# Patient Record
Sex: FEMALE | Race: WHITE
[De-identification: names, ages, dates, MRNs, and addresses within clinical notes are randomized per-mention and may not be internally consistent; named-entity substitution may affect disease eponyms.]

---

## 2018-11-12 ENCOUNTER — HOSPITAL ENCOUNTER (EMERGENCY)
Dept: HOSPITAL 8 - ED | Age: 58
Discharge: HOME | End: 2018-11-12
Payer: MEDICAID

## 2018-11-12 VITALS — BODY MASS INDEX: 24.59 KG/M2 | WEIGHT: 153 LBS | HEIGHT: 66 IN

## 2018-11-12 VITALS — DIASTOLIC BLOOD PRESSURE: 78 MMHG | SYSTOLIC BLOOD PRESSURE: 125 MMHG

## 2018-11-12 DIAGNOSIS — B35.3: ICD-10-CM

## 2018-11-12 DIAGNOSIS — L03.031: Primary | ICD-10-CM

## 2018-11-12 PROCEDURE — 99283 EMERGENCY DEPT VISIT LOW MDM: CPT

## 2018-11-12 PROCEDURE — 82962 GLUCOSE BLOOD TEST: CPT

## 2019-06-01 ENCOUNTER — HOSPITAL ENCOUNTER (EMERGENCY)
Dept: HOSPITAL 8 - ED | Age: 59
Discharge: HOME | End: 2019-06-01
Payer: SELF-PAY

## 2019-06-01 VITALS — DIASTOLIC BLOOD PRESSURE: 77 MMHG | SYSTOLIC BLOOD PRESSURE: 125 MMHG

## 2019-06-01 VITALS — BODY MASS INDEX: 24.55 KG/M2 | WEIGHT: 152.78 LBS | HEIGHT: 66 IN

## 2019-06-01 DIAGNOSIS — F10.220: Primary | ICD-10-CM

## 2019-06-01 DIAGNOSIS — E86.0: ICD-10-CM

## 2019-06-01 DIAGNOSIS — R11.2: ICD-10-CM

## 2019-06-01 DIAGNOSIS — F17.200: ICD-10-CM

## 2019-06-01 LAB
ALBUMIN SERPL-MCNC: 3.8 G/DL (ref 3.4–5)
ALP SERPL-CCNC: 67 U/L (ref 45–117)
ALT SERPL-CCNC: 53 U/L (ref 12–78)
ANION GAP SERPL CALC-SCNC: 11 MMOL/L (ref 5–15)
BASOPHILS # BLD AUTO: 0.06 X10^3/UL (ref 0–0.1)
BASOPHILS NFR BLD AUTO: 1 % (ref 0–1)
BILIRUB SERPL-MCNC: 0.3 MG/DL (ref 0.2–1)
CALCIUM SERPL-MCNC: 9.5 MG/DL (ref 8.5–10.1)
CHLORIDE SERPL-SCNC: 107 MMOL/L (ref 98–107)
CREAT SERPL-MCNC: 0.61 MG/DL (ref 0.55–1.02)
EOSINOPHIL # BLD AUTO: 0.07 X10^3/UL (ref 0–0.4)
EOSINOPHIL NFR BLD AUTO: 1 % (ref 1–7)
ERYTHROCYTE [DISTWIDTH] IN BLOOD BY AUTOMATED COUNT: 14.1 % (ref 9.6–15.2)
LYMPHOCYTES # BLD AUTO: 3.13 X10^3/UL (ref 1–3.4)
LYMPHOCYTES NFR BLD AUTO: 45 % (ref 22–44)
MCH RBC QN AUTO: 34.9 PG (ref 27–34.8)
MCHC RBC AUTO-ENTMCNC: 34 G/DL (ref 32.4–35.8)
MCV RBC AUTO: 102.7 FL (ref 80–100)
MD: NO
MONOCYTES # BLD AUTO: 0.74 X10^3/UL (ref 0.2–0.8)
MONOCYTES NFR BLD AUTO: 11 % (ref 2–9)
NEUTROPHILS # BLD AUTO: 2.92 X10^3/UL (ref 1.8–6.8)
NEUTROPHILS NFR BLD AUTO: 42 % (ref 42–75)
PLATELET # BLD AUTO: 271 X10^3/UL (ref 130–400)
PMV BLD AUTO: 7.4 FL (ref 7.4–10.4)
PROT SERPL-MCNC: 7.5 G/DL (ref 6.4–8.2)
RBC # BLD AUTO: 4.82 X10^6/UL (ref 3.82–5.3)

## 2019-06-01 PROCEDURE — 80053 COMPREHEN METABOLIC PANEL: CPT

## 2019-06-01 PROCEDURE — 85025 COMPLETE CBC W/AUTO DIFF WBC: CPT

## 2019-06-01 PROCEDURE — 96375 TX/PRO/DX INJ NEW DRUG ADDON: CPT

## 2019-06-01 PROCEDURE — 96361 HYDRATE IV INFUSION ADD-ON: CPT

## 2019-06-01 PROCEDURE — 80307 DRUG TEST PRSMV CHEM ANLYZR: CPT

## 2019-06-01 PROCEDURE — 96374 THER/PROPH/DIAG INJ IV PUSH: CPT

## 2019-06-01 PROCEDURE — 99283 EMERGENCY DEPT VISIT LOW MDM: CPT

## 2019-06-01 PROCEDURE — 36415 COLL VENOUS BLD VENIPUNCTURE: CPT

## 2019-06-01 PROCEDURE — 83690 ASSAY OF LIPASE: CPT

## 2019-06-01 NOTE — NUR
PT STATES THAT SHE HAS BEEN HAVING N/V/D X 5 DAYS. PT STATES THAT SHE HASN'T 
REALLY BEEN ABLE TO KEEP ANY FOOD OR FLUIDS DOWN. monitors applied, siderails 
up x2, call light within reach

## 2019-06-01 NOTE — NUR
IV SITE STARTED BY PARAMEDIC STUDENT, IV FLUIDS INFUSING AND PT MEDICATED PER 
MAR. AWAITING LAB RESULTS

## 2022-05-03 ENCOUNTER — APPOINTMENT (OUTPATIENT)
Dept: RADIOLOGY | Facility: MEDICAL CENTER | Age: 62
DRG: 897 | End: 2022-05-03
Attending: EMERGENCY MEDICINE
Payer: MEDICAID

## 2022-05-03 ENCOUNTER — HOSPITAL ENCOUNTER (INPATIENT)
Facility: MEDICAL CENTER | Age: 62
LOS: 1 days | DRG: 897 | End: 2022-05-04
Attending: EMERGENCY MEDICINE | Admitting: INTERNAL MEDICINE
Payer: MEDICAID

## 2022-05-03 PROBLEM — F10.929 ALCOHOLIC INTOXICATION WITH COMPLICATION (HCC): Status: ACTIVE | Noted: 2022-05-03

## 2022-05-03 PROBLEM — R94.31 PROLONGATION OF QRS COMPLEX ON ELECTROCARDIOGRAPHY: Status: ACTIVE | Noted: 2022-05-03

## 2022-05-03 PROBLEM — R74.01 TRANSAMINITIS: Status: ACTIVE | Noted: 2022-05-03

## 2022-05-03 PROBLEM — R11.2 NAUSEA & VOMITING: Status: ACTIVE | Noted: 2022-05-03

## 2022-05-03 PROBLEM — N39.0 URINARY TRACT INFECTION WITHOUT HEMATURIA: Status: ACTIVE | Noted: 2022-05-03

## 2022-05-03 PROBLEM — E86.0 DEHYDRATION: Status: ACTIVE | Noted: 2022-05-03

## 2022-05-03 PROBLEM — R19.7 DIARRHEA: Status: RESOLVED | Noted: 2022-05-03 | Resolved: 2022-05-03

## 2022-05-03 PROBLEM — E87.6 HYPOKALEMIA: Status: ACTIVE | Noted: 2022-05-03

## 2022-05-03 PROBLEM — R19.7 DIARRHEA: Status: ACTIVE | Noted: 2022-05-03

## 2022-05-03 PROBLEM — R79.89 TROPONIN I ABOVE REFERENCE RANGE: Status: ACTIVE | Noted: 2022-05-03

## 2022-05-03 LAB
ALBUMIN SERPL BCP-MCNC: 3.3 G/DL (ref 3.2–4.9)
ALBUMIN/GLOB SERPL: 0.9 G/DL
ALP SERPL-CCNC: 279 U/L (ref 30–99)
ALT SERPL-CCNC: 43 U/L (ref 2–50)
AMORPH CRY #/AREA URNS HPF: PRESENT /HPF
ANION GAP SERPL CALC-SCNC: 18 MMOL/L (ref 7–16)
ANISOCYTOSIS BLD QL SMEAR: ABNORMAL
APPEARANCE UR: ABNORMAL
AST SERPL-CCNC: 201 U/L (ref 12–45)
BACTERIA #/AREA URNS HPF: ABNORMAL /HPF
BASOPHILS # BLD AUTO: 0.9 % (ref 0–1.8)
BASOPHILS # BLD: 0.06 K/UL (ref 0–0.12)
BILIRUB SERPL-MCNC: 2.6 MG/DL (ref 0.1–1.5)
BILIRUB UR QL STRIP.AUTO: NEGATIVE
BUN SERPL-MCNC: 6 MG/DL (ref 8–22)
CALCIUM SERPL-MCNC: 8.9 MG/DL (ref 8.5–10.5)
CHLORIDE SERPL-SCNC: 95 MMOL/L (ref 96–112)
CO2 SERPL-SCNC: 25 MMOL/L (ref 20–33)
COLOR UR: YELLOW
CREAT SERPL-MCNC: 0.31 MG/DL (ref 0.5–1.4)
EOSINOPHIL # BLD AUTO: 0 K/UL (ref 0–0.51)
EOSINOPHIL NFR BLD: 0 % (ref 0–6.9)
EPI CELLS #/AREA URNS HPF: NEGATIVE /HPF
ERYTHROCYTE [DISTWIDTH] IN BLOOD BY AUTOMATED COUNT: 75.1 FL (ref 35.9–50)
ETHANOL BLD-MCNC: 263.6 MG/DL
GFR SERPLBLD CREATININE-BSD FMLA CKD-EPI: 120 ML/MIN/1.73 M 2
GLOBULIN SER CALC-MCNC: 3.8 G/DL (ref 1.9–3.5)
GLUCOSE SERPL-MCNC: 88 MG/DL (ref 65–99)
GLUCOSE UR STRIP.AUTO-MCNC: NEGATIVE MG/DL
HCT VFR BLD AUTO: 38.3 % (ref 37–47)
HGB BLD-MCNC: 13.2 G/DL (ref 12–16)
HYALINE CASTS #/AREA URNS LPF: ABNORMAL /LPF
KETONES UR STRIP.AUTO-MCNC: NEGATIVE MG/DL
LACTATE BLD-SCNC: 5.4 MMOL/L (ref 0.5–2)
LEUKOCYTE ESTERASE UR QL STRIP.AUTO: ABNORMAL
LIPASE SERPL-CCNC: 23 U/L (ref 11–82)
LYMPHOCYTES # BLD AUTO: 1.68 K/UL (ref 1–4.8)
LYMPHOCYTES NFR BLD: 25.4 % (ref 22–41)
MACROCYTES BLD QL SMEAR: ABNORMAL
MAGNESIUM SERPL-MCNC: 1.7 MG/DL (ref 1.5–2.5)
MANUAL DIFF BLD: NORMAL
MCH RBC QN AUTO: 35.6 PG (ref 27–33)
MCHC RBC AUTO-ENTMCNC: 34.5 G/DL (ref 33.6–35)
MCV RBC AUTO: 103.2 FL (ref 81.4–97.8)
METAMYELOCYTES NFR BLD MANUAL: 0.8 %
MICRO URNS: ABNORMAL
MONOCYTES # BLD AUTO: 0.67 K/UL (ref 0–0.85)
MONOCYTES NFR BLD AUTO: 10.2 % (ref 0–13.4)
MORPHOLOGY BLD-IMP: NORMAL
NEUTROPHILS # BLD AUTO: 4.14 K/UL (ref 2–7.15)
NEUTROPHILS NFR BLD: 62.7 % (ref 44–72)
NITRITE UR QL STRIP.AUTO: POSITIVE
NRBC # BLD AUTO: 0 K/UL
NRBC BLD-RTO: 0 /100 WBC
OVALOCYTES BLD QL SMEAR: NORMAL
PH UR STRIP.AUTO: 7 [PH] (ref 5–8)
PHOSPHATE SERPL-MCNC: 2.5 MG/DL (ref 2.5–4.5)
PLATELET # BLD AUTO: 294 K/UL (ref 164–446)
PLATELET BLD QL SMEAR: NORMAL
PMV BLD AUTO: 9.5 FL (ref 9–12.9)
POIKILOCYTOSIS BLD QL SMEAR: NORMAL
POLYCHROMASIA BLD QL SMEAR: NORMAL
POTASSIUM SERPL-SCNC: 2.5 MMOL/L (ref 3.6–5.5)
POTASSIUM SERPL-SCNC: 2.7 MMOL/L (ref 3.6–5.5)
PROCALCITONIN SERPL-MCNC: 0.46 NG/ML
PROT SERPL-MCNC: 7.1 G/DL (ref 6–8.2)
PROT UR QL STRIP: NEGATIVE MG/DL
RBC # BLD AUTO: 3.71 M/UL (ref 4.2–5.4)
RBC # URNS HPF: ABNORMAL /HPF
RBC BLD AUTO: PRESENT
RBC UR QL AUTO: NEGATIVE
SODIUM SERPL-SCNC: 138 MMOL/L (ref 135–145)
SP GR UR STRIP.AUTO: 1.01
TROPONIN T SERPL-MCNC: 113 NG/L (ref 6–19)
TROPONIN T SERPL-MCNC: 119 NG/L (ref 6–19)
UROBILINOGEN UR STRIP.AUTO-MCNC: 1 MG/DL
WBC # BLD AUTO: 6.6 K/UL (ref 4.8–10.8)
WBC #/AREA URNS HPF: ABNORMAL /HPF

## 2022-05-03 PROCEDURE — 71045 X-RAY EXAM CHEST 1 VIEW: CPT

## 2022-05-03 PROCEDURE — 83605 ASSAY OF LACTIC ACID: CPT

## 2022-05-03 PROCEDURE — 87040 BLOOD CULTURE FOR BACTERIA: CPT | Mod: 91

## 2022-05-03 PROCEDURE — 93005 ELECTROCARDIOGRAM TRACING: CPT | Performed by: EMERGENCY MEDICINE

## 2022-05-03 PROCEDURE — 70450 CT HEAD/BRAIN W/O DYE: CPT

## 2022-05-03 PROCEDURE — 96375 TX/PRO/DX INJ NEW DRUG ADDON: CPT

## 2022-05-03 PROCEDURE — 700111 HCHG RX REV CODE 636 W/ 250 OVERRIDE (IP): Performed by: STUDENT IN AN ORGANIZED HEALTH CARE EDUCATION/TRAINING PROGRAM

## 2022-05-03 PROCEDURE — 83690 ASSAY OF LIPASE: CPT

## 2022-05-03 PROCEDURE — 85007 BL SMEAR W/DIFF WBC COUNT: CPT

## 2022-05-03 PROCEDURE — 700102 HCHG RX REV CODE 250 W/ 637 OVERRIDE(OP): Performed by: STUDENT IN AN ORGANIZED HEALTH CARE EDUCATION/TRAINING PROGRAM

## 2022-05-03 PROCEDURE — 700105 HCHG RX REV CODE 258

## 2022-05-03 PROCEDURE — 700101 HCHG RX REV CODE 250

## 2022-05-03 PROCEDURE — 36415 COLL VENOUS BLD VENIPUNCTURE: CPT

## 2022-05-03 PROCEDURE — HZ2ZZZZ DETOXIFICATION SERVICES FOR SUBSTANCE ABUSE TREATMENT: ICD-10-PCS | Performed by: INTERNAL MEDICINE

## 2022-05-03 PROCEDURE — 85025 COMPLETE CBC W/AUTO DIFF WBC: CPT

## 2022-05-03 PROCEDURE — 96365 THER/PROPH/DIAG IV INF INIT: CPT

## 2022-05-03 PROCEDURE — 87086 URINE CULTURE/COLONY COUNT: CPT

## 2022-05-03 PROCEDURE — G0378 HOSPITAL OBSERVATION PER HR: HCPCS

## 2022-05-03 PROCEDURE — 99285 EMERGENCY DEPT VISIT HI MDM: CPT

## 2022-05-03 PROCEDURE — 80053 COMPREHEN METABOLIC PANEL: CPT

## 2022-05-03 PROCEDURE — 84132 ASSAY OF SERUM POTASSIUM: CPT | Mod: XU

## 2022-05-03 PROCEDURE — 84145 PROCALCITONIN (PCT): CPT

## 2022-05-03 PROCEDURE — 700111 HCHG RX REV CODE 636 W/ 250 OVERRIDE (IP): Performed by: EMERGENCY MEDICINE

## 2022-05-03 PROCEDURE — 74018 RADEX ABDOMEN 1 VIEW: CPT

## 2022-05-03 PROCEDURE — 81001 URINALYSIS AUTO W/SCOPE: CPT

## 2022-05-03 PROCEDURE — 82077 ASSAY SPEC XCP UR&BREATH IA: CPT

## 2022-05-03 PROCEDURE — 83735 ASSAY OF MAGNESIUM: CPT

## 2022-05-03 PROCEDURE — 84100 ASSAY OF PHOSPHORUS: CPT

## 2022-05-03 PROCEDURE — 96366 THER/PROPH/DIAG IV INF ADDON: CPT

## 2022-05-03 PROCEDURE — 84484 ASSAY OF TROPONIN QUANT: CPT

## 2022-05-03 PROCEDURE — A9270 NON-COVERED ITEM OR SERVICE: HCPCS | Performed by: EMERGENCY MEDICINE

## 2022-05-03 PROCEDURE — 700105 HCHG RX REV CODE 258: Performed by: EMERGENCY MEDICINE

## 2022-05-03 PROCEDURE — 99220 PR INITIAL OBSERVATION CARE,LEVL III: CPT | Performed by: STUDENT IN AN ORGANIZED HEALTH CARE EDUCATION/TRAINING PROGRAM

## 2022-05-03 PROCEDURE — 96367 TX/PROPH/DG ADDL SEQ IV INF: CPT

## 2022-05-03 PROCEDURE — 700105 HCHG RX REV CODE 258: Performed by: STUDENT IN AN ORGANIZED HEALTH CARE EDUCATION/TRAINING PROGRAM

## 2022-05-03 PROCEDURE — 700102 HCHG RX REV CODE 250 W/ 637 OVERRIDE(OP): Performed by: EMERGENCY MEDICINE

## 2022-05-03 PROCEDURE — 96368 THER/DIAG CONCURRENT INF: CPT

## 2022-05-03 PROCEDURE — A9270 NON-COVERED ITEM OR SERVICE: HCPCS | Performed by: STUDENT IN AN ORGANIZED HEALTH CARE EDUCATION/TRAINING PROGRAM

## 2022-05-03 PROCEDURE — 700101 HCHG RX REV CODE 250: Performed by: EMERGENCY MEDICINE

## 2022-05-03 RX ORDER — POTASSIUM CHLORIDE 7.45 MG/ML
10 INJECTION INTRAVENOUS ONCE
Status: COMPLETED | OUTPATIENT
Start: 2022-05-03 | End: 2022-05-03

## 2022-05-03 RX ORDER — LORAZEPAM 1 MG/1
2 TABLET ORAL
Status: DISCONTINUED | OUTPATIENT
Start: 2022-05-03 | End: 2022-05-04 | Stop reason: HOSPADM

## 2022-05-03 RX ORDER — LORAZEPAM 2 MG/ML
1 INJECTION INTRAMUSCULAR
Status: DISCONTINUED | OUTPATIENT
Start: 2022-05-03 | End: 2022-05-04 | Stop reason: HOSPADM

## 2022-05-03 RX ORDER — LORAZEPAM 2 MG/ML
0.5 INJECTION INTRAMUSCULAR EVERY 4 HOURS PRN
Status: DISCONTINUED | OUTPATIENT
Start: 2022-05-03 | End: 2022-05-04 | Stop reason: HOSPADM

## 2022-05-03 RX ORDER — POLYETHYLENE GLYCOL 3350 17 G/17G
1 POWDER, FOR SOLUTION ORAL
Status: DISCONTINUED | OUTPATIENT
Start: 2022-05-03 | End: 2022-05-04

## 2022-05-03 RX ORDER — ENEMA 19; 7 G/133ML; G/133ML
1 ENEMA RECTAL ONCE
Status: DISCONTINUED | OUTPATIENT
Start: 2022-05-03 | End: 2022-05-04

## 2022-05-03 RX ORDER — BISACODYL 10 MG
10 SUPPOSITORY, RECTAL RECTAL
Status: DISCONTINUED | OUTPATIENT
Start: 2022-05-03 | End: 2022-05-03

## 2022-05-03 RX ORDER — LORAZEPAM 2 MG/ML
2 INJECTION INTRAMUSCULAR
Status: DISCONTINUED | OUTPATIENT
Start: 2022-05-03 | End: 2022-05-04 | Stop reason: HOSPADM

## 2022-05-03 RX ORDER — LORAZEPAM 1 MG/1
3 TABLET ORAL
Status: DISCONTINUED | OUTPATIENT
Start: 2022-05-03 | End: 2022-05-04 | Stop reason: HOSPADM

## 2022-05-03 RX ORDER — SODIUM CHLORIDE 9 MG/ML
1000 INJECTION, SOLUTION INTRAVENOUS ONCE
Status: COMPLETED | OUTPATIENT
Start: 2022-05-03 | End: 2022-05-03

## 2022-05-03 RX ORDER — FOLIC ACID 1 MG/1
1 TABLET ORAL DAILY
Status: DISCONTINUED | OUTPATIENT
Start: 2022-05-04 | End: 2022-05-04 | Stop reason: HOSPADM

## 2022-05-03 RX ORDER — LORAZEPAM 1 MG/1
4 TABLET ORAL
Status: DISCONTINUED | OUTPATIENT
Start: 2022-05-03 | End: 2022-05-04 | Stop reason: HOSPADM

## 2022-05-03 RX ORDER — LORAZEPAM 1 MG/1
0.5 TABLET ORAL EVERY 4 HOURS PRN
Status: DISCONTINUED | OUTPATIENT
Start: 2022-05-03 | End: 2022-05-04 | Stop reason: HOSPADM

## 2022-05-03 RX ORDER — POTASSIUM CHLORIDE 20 MEQ/1
40 TABLET, EXTENDED RELEASE ORAL ONCE
Status: COMPLETED | OUTPATIENT
Start: 2022-05-03 | End: 2022-05-03

## 2022-05-03 RX ORDER — CEFTRIAXONE 2 G/1
2 INJECTION, POWDER, FOR SOLUTION INTRAMUSCULAR; INTRAVENOUS ONCE
Status: COMPLETED | OUTPATIENT
Start: 2022-05-03 | End: 2022-05-03

## 2022-05-03 RX ORDER — BISACODYL 10 MG
10 SUPPOSITORY, RECTAL RECTAL
Status: DISCONTINUED | OUTPATIENT
Start: 2022-05-03 | End: 2022-05-04

## 2022-05-03 RX ORDER — AMOXICILLIN 250 MG
2 CAPSULE ORAL 2 TIMES DAILY
Status: DISCONTINUED | OUTPATIENT
Start: 2022-05-03 | End: 2022-05-03

## 2022-05-03 RX ORDER — GAUZE BANDAGE 2" X 2"
100 BANDAGE TOPICAL DAILY
Status: DISCONTINUED | OUTPATIENT
Start: 2022-05-04 | End: 2022-05-04 | Stop reason: HOSPADM

## 2022-05-03 RX ORDER — SODIUM CHLORIDE AND POTASSIUM CHLORIDE 300; 900 MG/100ML; MG/100ML
INJECTION, SOLUTION INTRAVENOUS CONTINUOUS
Status: DISCONTINUED | OUTPATIENT
Start: 2022-05-03 | End: 2022-05-04

## 2022-05-03 RX ORDER — SODIUM CHLORIDE 9 MG/ML
INJECTION, SOLUTION INTRAVENOUS CONTINUOUS
Status: DISCONTINUED | OUTPATIENT
Start: 2022-05-03 | End: 2022-05-04 | Stop reason: HOSPADM

## 2022-05-03 RX ORDER — POTASSIUM CHLORIDE 20 MEQ/1
40 TABLET, EXTENDED RELEASE ORAL EVERY 4 HOURS
Status: DISPENSED | OUTPATIENT
Start: 2022-05-03 | End: 2022-05-04

## 2022-05-03 RX ORDER — NEOMYCIN SULFATE 500 MG/1
500 TABLET ORAL 2 TIMES DAILY
Status: ON HOLD | COMMUNITY
End: 2022-05-08

## 2022-05-03 RX ORDER — POLYETHYLENE GLYCOL 3350 17 G/17G
1 POWDER, FOR SOLUTION ORAL
Status: DISCONTINUED | OUTPATIENT
Start: 2022-05-03 | End: 2022-05-03

## 2022-05-03 RX ORDER — OXYBUTYNIN CHLORIDE 5 MG/1
5 TABLET ORAL 2 TIMES DAILY
Status: ON HOLD | COMMUNITY
End: 2022-06-13

## 2022-05-03 RX ORDER — LORAZEPAM 2 MG/ML
1.5 INJECTION INTRAMUSCULAR
Status: DISCONTINUED | OUTPATIENT
Start: 2022-05-03 | End: 2022-05-04 | Stop reason: HOSPADM

## 2022-05-03 RX ORDER — AMOXICILLIN 250 MG
2 CAPSULE ORAL 2 TIMES DAILY
Status: DISCONTINUED | OUTPATIENT
Start: 2022-05-03 | End: 2022-05-04

## 2022-05-03 RX ORDER — LORAZEPAM 1 MG/1
1 TABLET ORAL EVERY 4 HOURS PRN
Status: DISCONTINUED | OUTPATIENT
Start: 2022-05-03 | End: 2022-05-04 | Stop reason: HOSPADM

## 2022-05-03 RX ADMIN — LORAZEPAM 1.5 MG: 2 INJECTION INTRAMUSCULAR; INTRAVENOUS at 21:59

## 2022-05-03 RX ADMIN — THIAMINE HYDROCHLORIDE: 100 INJECTION, SOLUTION INTRAMUSCULAR; INTRAVENOUS at 15:31

## 2022-05-03 RX ADMIN — SENNOSIDES AND DOCUSATE SODIUM 2 TABLET: 50; 8.6 TABLET ORAL at 18:00

## 2022-05-03 RX ADMIN — SODIUM CHLORIDE 1000 ML: 9 INJECTION, SOLUTION INTRAVENOUS at 15:32

## 2022-05-03 RX ADMIN — RIVAROXABAN 10 MG: 10 TABLET, FILM COATED ORAL at 19:03

## 2022-05-03 RX ADMIN — POTASSIUM CHLORIDE 40 MEQ: 1500 TABLET, EXTENDED RELEASE ORAL at 15:30

## 2022-05-03 RX ADMIN — POTASSIUM CHLORIDE 40 MEQ: 20 TABLET, EXTENDED RELEASE ORAL at 22:02

## 2022-05-03 RX ADMIN — SODIUM CHLORIDE: 9 INJECTION, SOLUTION INTRAVENOUS at 19:03

## 2022-05-03 RX ADMIN — POTASSIUM CHLORIDE 10 MEQ: 10 INJECTION, SOLUTION INTRAVENOUS at 15:30

## 2022-05-03 RX ADMIN — POTASSIUM CHLORIDE AND SODIUM CHLORIDE: 900; 300 INJECTION, SOLUTION INTRAVENOUS at 22:02

## 2022-05-03 RX ADMIN — CEFTRIAXONE SODIUM 2 G: 2 INJECTION, POWDER, FOR SOLUTION INTRAMUSCULAR; INTRAVENOUS at 15:29

## 2022-05-03 RX ADMIN — SODIUM CHLORIDE 1000 ML: 9 INJECTION, SOLUTION INTRAVENOUS at 19:04

## 2022-05-03 RX ADMIN — POTASSIUM CHLORIDE 40 MEQ: 20 TABLET, EXTENDED RELEASE ORAL at 19:03

## 2022-05-03 ASSESSMENT — ENCOUNTER SYMPTOMS
NAUSEA: 1
BLURRED VISION: 0
TREMORS: 1
DIZZINESS: 0
ABDOMINAL PAIN: 1
BRUISES/BLEEDS EASILY: 0
SHORTNESS OF BREATH: 0
COUGH: 0
FEVER: 0
MYALGIAS: 0

## 2022-05-03 ASSESSMENT — LIFESTYLE VARIABLES
TOTAL SCORE: 21
PAROXYSMAL SWEATS: NO SWEAT VISIBLE
TACTILE DISTURBANCES: VERY MILD ITCHING, PINS AND NEEDLES SENSATION, BURNING OR NUMBNESS
AGITATION: *
NAUSEA AND VOMITING: *
AUDITORY DISTURBANCES: NOT PRESENT
TREMOR: *
ORIENTATION AND CLOUDING OF SENSORIUM: ORIENTED AND CAN DO SERIAL ADDITIONS
ANXIETY: *
TOTAL SCORE: 4
ORIENTATION AND CLOUDING OF SENSORIUM: ORIENTED AND CAN DO SERIAL ADDITIONS
TREMOR: *
AUDITORY DISTURBANCES: MILD HARSHNESS OR ABILITY TO FRIGHTEN
HEADACHE, FULLNESS IN HEAD: MODERATELY SEVERE
VISUAL DISTURBANCES: NOT PRESENT
AGITATION: NORMAL ACTIVITY
ANXIETY: NO ANXIETY (AT EASE)
NAUSEA AND VOMITING: MILD NAUSEA WITH NO VOMITING
PAROXYSMAL SWEATS: NO SWEAT VISIBLE
VISUAL DISTURBANCES: NOT PRESENT
HEADACHE, FULLNESS IN HEAD: VERY MILD

## 2022-05-03 NOTE — ED NOTES
Med Rec complete per pt  Allergies Reviewed    Pt reports that she has been taking NEOMYCIN since 3/2022.  RX for 1000 mg 4 times daily, pt reports that she has been taking 500 mg twice daily.

## 2022-05-03 NOTE — ASSESSMENT & PLAN NOTE
Alcohol induced  Continue monitor  Get right upper quadrant ultrasound  Consult for alcohol abstinence

## 2022-05-03 NOTE — ED PROVIDER NOTES
ED Provider Note    CHIEF COMPLAINT  Chief Complaint   Patient presents with   • Abdominal Pain     RLQ X 1 month, dx c-diff at [facility] one month ago, tx with abx currently, denies diarrhea, denies blood in stool or urine   • N/V     X 3 months, denies blood in vomit   • Weight Loss     X 6 months, weight loss of approx 70 pounds, pt has been admitted to [facility] twice and states she was not given a diagnosis, denies hx of cancer   • Weakness     X 3 months       HPI  Vianney Castro is a 62 y.o. female who presents with a chief complaint of weight loss, abdominal pain, nausea and vomiting, generalized weakness, and inability to care for self.  This is been ongoing for the past several months.  She reports she was recently discharged from [facility] after hospitalization for similar symptoms.  She reports that she was diagnosed with C. difficile and was started on neomycin.  She has been taking this medication but has persistent diarrhea.  She has not had any measured fevers but does report being cold.  She denies any melena or hematochezia.  She has had decreased appetite because whenever she eats she becomes nauseated.  She reports a significant weight loss over the past several months.  She has had multiple falls and does not think she can take care of herself at home.  She does admit to drinking alcohol routinely, typically 1/5 but has been halving that.    REVIEW OF SYSTEMS  See HPI for further details.  Abdominal pain.  Nausea.  Vomiting.  Weight loss.  Weakness.  Failure to thrive.  All other systems are negative.     PAST MEDICAL HISTORY   has a past medical history of Encephalitis and Hyperlipidemia.    SOCIAL HISTORY  Social History     Tobacco Use   • Smoking status: Current Every Day Smoker   • Smokeless tobacco: Not on file   Substance and Sexual Activity   • Alcohol use: Yes   • Drug use: No   • Sexual activity: Not on file       SURGICAL HISTORY   has a past surgical history that includes treat  "ectopic preg,non remval; tonsillectomy; and other orthopedic surgery.    CURRENT MEDICATIONS  Home Medications     Reviewed by Salvador Webster R.N. (Registered Nurse) on 05/03/22 at 1215  Med List Status: <None>   Medication Last Dose Status        Patient James Taking any Medications                       ALLERGIES  No Known Allergies    PHYSICAL EXAM  VITAL SIGNS: BP (!) 96/63   Pulse 95   Temp 36.3 °C (97.3 °F) (Temporal)   Resp 20   Ht 1.676 m (5' 6\")   Wt 45.4 kg (100 lb)   SpO2 94%   BMI 16.14 kg/m²    Pulse ox interpretation: I interpret this pulse ox as normal.  Constitutional: Alert in no apparent distress.  HENT: No signs of trauma, Bilateral external ears normal, Nose normal.  Dry mucous membranes.  Eyes: Pupils are equal and reactive, Conjunctiva normal, Non-icteric.   Neck: Normal range of motion, No tenderness, Supple, No stridor.   Lymphatic: No lymphadenopathy noted.   Cardiovascular: Regular rate and rhythm, no murmurs. Pulses symmetrical.  Thorax & Lungs: Normal breath sounds, No respiratory distress, No wheezing.   Abdomen: Bowel sounds normal, Soft, diffuse but mild tenderness, No masses, No pulsatile masses. No peritoneal signs.  Skin: Warm, Dry, No erythema, No rash.   Back: Normal alignment.  Extremities: No cyanosis.  Musculoskeletal: No major deformities noted.   Neurologic: Alert, No focal deficits noted.   Psychiatric: Affect normal, Judgment normal, Mood normal.     DIAGNOSTIC STUDIES / PROCEDURES    LABS  Results for orders placed or performed during the hospital encounter of 05/03/22   CBC WITH DIFFERENTIAL   Result Value Ref Range    WBC 6.6 4.8 - 10.8 K/uL    RBC 3.71 (L) 4.20 - 5.40 M/uL    Hemoglobin 13.2 12.0 - 16.0 g/dL    Hematocrit 38.3 37.0 - 47.0 %    .2 (H) 81.4 - 97.8 fL    MCH 35.6 (H) 27.0 - 33.0 pg    MCHC 34.5 33.6 - 35.0 g/dL    RDW 75.1 (H) 35.9 - 50.0 fL    Platelet Count 294 164 - 446 K/uL    MPV 9.5 9.0 - 12.9 fL    Neutrophils-Polys 62.70 44.00 - " 72.00 %    Lymphocytes 25.40 22.00 - 41.00 %    Monocytes 10.20 0.00 - 13.40 %    Eosinophils 0.00 0.00 - 6.90 %    Basophils 0.90 0.00 - 1.80 %    Nucleated RBC 0.00 /100 WBC    Neutrophils (Absolute) 4.14 2.00 - 7.15 K/uL    Lymphs (Absolute) 1.68 1.00 - 4.80 K/uL    Monos (Absolute) 0.67 0.00 - 0.85 K/uL    Eos (Absolute) 0.00 0.00 - 0.51 K/uL    Baso (Absolute) 0.06 0.00 - 0.12 K/uL    NRBC (Absolute) 0.00 K/uL    Anisocytosis 2+ (A)     Macrocytosis 2+ (A)    COMP METABOLIC PANEL   Result Value Ref Range    Sodium 138 135 - 145 mmol/L    Potassium 2.5 (LL) 3.6 - 5.5 mmol/L    Chloride 95 (L) 96 - 112 mmol/L    Co2 25 20 - 33 mmol/L    Anion Gap 18.0 (H) 7.0 - 16.0    Glucose 88 65 - 99 mg/dL    Bun 6 (L) 8 - 22 mg/dL    Creatinine 0.31 (L) 0.50 - 1.40 mg/dL    Calcium 8.9 8.5 - 10.5 mg/dL    AST(SGOT) 201 (H) 12 - 45 U/L    ALT(SGPT) 43 2 - 50 U/L    Alkaline Phosphatase 279 (H) 30 - 99 U/L    Total Bilirubin 2.6 (H) 0.1 - 1.5 mg/dL    Albumin 3.3 3.2 - 4.9 g/dL    Total Protein 7.1 6.0 - 8.2 g/dL    Globulin 3.8 (H) 1.9 - 3.5 g/dL    A-G Ratio 0.9 g/dL   LIPASE   Result Value Ref Range    Lipase 23 11 - 82 U/L   URINALYSIS    Specimen: Urine   Result Value Ref Range    Color Yellow     Character Cloudy (A)     Specific Gravity 1.007 <1.035    Ph 7.0 5.0 - 8.0    Glucose Negative Negative mg/dL    Ketones Negative Negative mg/dL    Protein Negative Negative mg/dL    Bilirubin Negative Negative    Urobilinogen, Urine 1.0 Negative    Nitrite Positive (A) Negative    Leukocyte Esterase Moderate (A) Negative    Occult Blood Negative Negative    Micro Urine Req Microscopic    URINE MICROSCOPIC (W/UA)   Result Value Ref Range    WBC 0-2 /hpf    RBC 0-2 /hpf    Bacteria Moderate (A) None /hpf    Epithelial Cells Negative /hpf    Amorphous Crystal Present /hpf    Hyaline Cast 0-2 /lpf   DIFFERENTIAL MANUAL   Result Value Ref Range    Metamyelocytes 0.80 %    Manual Diff Status PERFORMED    PERIPHERAL SMEAR REVIEW    Result Value Ref Range    Peripheral Smear Review see below    PLATELET ESTIMATE   Result Value Ref Range    Plt Estimation Normal    MORPHOLOGY   Result Value Ref Range    RBC Morphology Present     Polychromia 1+     Poikilocytosis 1+     Ovalocytes 1+    ESTIMATED GFR   Result Value Ref Range    GFR (CKD-EPI) 120 >60 mL/min/1.73 m 2     RADIOLOGY  [id number] VIEW   Final Result      1.  Large amount of stool in the rectum.   2.  No bowel obstruction.      DX-CHEST-PORTABLE (1 VIEW)   Final Result      No acute cardiopulmonary abnormality.         CT-HEAD W/O    (Results Pending)     COURSE & MEDICAL DECISION MAKING  Pertinent Labs & Imaging studies reviewed. (See chart for details)  Records obtained and reviewed: Patient was admitted to [facility] in March for generalized weakness and inability to care for herself.  She does have a history of alcohol dependence and pancreatitis.  During her most recent hospitalization she had nausea and vomiting with hematemesis and diarrhea.  Patient was initially found to be hypotensive with hypokalemia to 1.8 and a sodium of 126.  She was diagnosed with C. difficile.  She was started on neomycin.  It was felt that she should require rehab.    This is a 62 year old female who is here with generalized weakness, failure to thrive, and inability to care for herself. She arrives with low blood pressure but is not overtly hypotensive. She is afebrile with otherwise normal vital signs beyond the blood pressure. She appears dehydrated with dry mucous membranes. She is able to move all four extremities with equal strength and sensation. She has some lower abdominal discomfort and reports constipation. Her abdomen is mildly tender but she has no peritoneal signs. An xray was performed that did reveal large amount of stool in the rectum but no bowel obstruction. An enema was ordered.    Patient was started on IV fluids.    CBC is without leukocytosis or anemia. MCV is quite  elevated c/w patient's reports of daily alcohol use. Metabolic panel demonstrates multiple abnormalities including hypokalemia to 2.5 which was repleted both orally and via IV. She does have mildly low Chloride at 95 with anion gap of 18. AST is elevated to 201 and total bilirubin is elevated to 2.6. Troponin elevated to 119. Patient denies any chest pain and EKG does not suggest acute ischemia. UA suggests infection and patient given a dose of ceftriaxone. Alcohol level is quite elevated to 263.6. Magnesium ordered.    CXR is without acute abnormality. CT head is without acute abnormality.    Patient reevaluated at bedside. Blood pressure remains low despite IV fluids. She will require hospitalization for additional workup and management.    Patient is critically ill.   The patient continues to have: Electrolyte abnormalities, NSTEMI, hypotension  The vital organ system that is affected is the: All are at risk  If untreated there is a high chance of deterioration into: cardiac arrest, respiratory arrest, and eventually death.   The critical care that I am providing today is: Review of medical records, initial bedside evaluation and resuscitation, interpretation of lab/imaging results, medication management, discussion with hospitalist, multiple reevaluations, and preparation of the medical record.  The critical that has been undertaken is medically complex.   There has been no overlap in critical care time.   Critical Care Time not including procedures: 35 minutes    HYDRATION  HYDRATION: Based on the patient's presentation of Dehydration and Hypotension the patient was given IV fluids. IV Hydration was used because oral hydration was not adequate alone. Upon recheck following hydration, the patient was Improved.    FINAL IMPRESSION  1. Lactic acidosis  2. Hypokalemia  3. NSTEMI  4. Alcohol intoxication  5. Transaminitis  6. UTI      Electronically signed by: Timothy Valle M.D., 5/3/2022 1:35 PM

## 2022-05-03 NOTE — ED TRIAGE NOTES
"Chief Complaint   Patient presents with   • Abdominal Pain     RLQ X 1 month, dx c-diff at [facility] one month ago, tx with abx currently, denies diarrhea, denies blood in stool or urine   • N/V     X 3 months, denies blood in vomit   • Weight Loss     X 6 months, weight loss of approx 70 pounds, pt has been admitted to [facility] twice and states she was not given a diagnosis, denies hx of cancer   • Weakness     X 3 months     Pt to triage in WC with family, VSS on RA, GCS 15, pt in obvious discomfort.    Pt returned to lobby. Educated on triage process and to inform staff of any changes.     /98   Pulse 83   Temp 36.3 °C (97.3 °F) (Temporal)   Resp 16   Ht 1.676 m (5' 6\")   Wt 45.4 kg (100 lb)   SpO2 99%   BMI 16.14 kg/m²     "

## 2022-05-03 NOTE — ED NOTES
Pt wheeled to room for the triaged complaint. Pt transferred from wheelchair to bed but appears quite weak.     Urine collected and sent.    Pt incontinent of urine, states she has had incontinence for 20 years and takes oxybutynin for it but it has continued to worsen.

## 2022-05-03 NOTE — H&P
Hospital Medicine History & Physical Note    Date of Service  5/3/2022    Primary Care Physician  Pcp Pt States None    Code Status  No Order    Chief Complaint  Chief Complaint   Patient presents with   • Abdominal Pain     RLQ X 1 month, dx c-diff at [place] [facility] one month ago, tx with abx currently, denies diarrhea, denies blood in stool or urine   • N/V     X 3 months, denies blood in vomit   • Weight Loss     X 6 months, weight loss of approx 70 pounds, pt has been admitted to [facility] twice and states she was not given a diagnosis, denies hx of cancer   • Weakness     X 3 months       History of Presenting Illness  Vianney Castro is a 62 y.o. female with past medical history of alcohol abuse, C. difficile colitis who presented 5/3/2022 with generalized weakness associated with abdominal pain, nausea/vomiting since past few weeks.  She also reports of sustaining multiple fall.  She been drinking alcohol every day. Last drink was yesterday .     Reports of generalized  fatigue associated with.  Denies fever , cough ,  chest pain , palpation, Shortness of breathe  Denies abdominal pain , no melena . No rash /ulcer. Denies weakness/numbness,visual changes ,headache ,syncope.     On arrival to ED patient remained hypotensive, blood pressure 96 / 83, tachycardia heart rate 95, respiratory rate 20.  Labs remarkable for hypokalemia potassium 2.5, BUN/creatinine 6/0.3, troponin under 19, alcohol level 263.6,  UA positive for nitrate. Chest xray unremarkable. CT head unremarkable      Admitted for alcohol intoxication with intractable nausea and vomiting.    I discussed the plan of care with patient.    Review of Systems  Review of Systems   Constitutional: Negative for fever.   HENT: Negative for hearing loss.    Eyes: Negative for blurred vision.   Respiratory: Negative for cough and shortness of breath.    Cardiovascular: Negative for chest pain.   Gastrointestinal: Positive for abdominal pain and nausea.    Genitourinary: Negative for dysuria.   Musculoskeletal: Negative for myalgias.   Skin: Negative for rash.   Neurological: Positive for tremors. Negative for dizziness.   Endo/Heme/Allergies: Does not bruise/bleed easily.       Past Medical History   has a past medical history of Encephalitis and Hyperlipidemia.    Surgical History   has a past surgical history that includes pr treat ectopic preg,non remval; tonsillectomy; and other orthopedic surgery.     Family History  family history is not on file.   Family history reviewed with patient. There is no family history that is pertinent to the chief complaint.     Social History   reports that she has been smoking. She does not have any smokeless tobacco history on file. She reports current alcohol use. She reports that she does not use drugs.    Allergies  No Known Allergies    Medications  Prior to Admission Medications   Prescriptions Last Dose Informant Patient Reported? Taking?   neomycin (MYCIFRADIN) 500 MG Tab 5/2/2022 at PM Patient Yes Yes   Sig: Take 500 mg by mouth 2 times a day. Pt started taking NEOMYCIN 03/2022. P   oxybutynin (DITROPAN) 5 MG Tab 5/2/2022 at PM Patient Yes Yes   Sig: Take 5 mg by mouth 2 times a day.      Facility-Administered Medications: None       Physical Exam  Temp:  [36.3 °C (97.3 °F)] 36.3 °C (97.3 °F)  Pulse:  [83-97] 95  Resp:  [16-20] 20  BP: ()/(63-98) 96/63  SpO2:  [94 %-99 %] 94 %  Blood Pressure: (!) 96/63   Temperature: 36.3 °C (97.3 °F)   Pulse: 95   Respiration: 20   Pulse Oximetry: 94 %       Physical Exam  Constitutional:       General: She is not in acute distress.     Appearance: She is ill-appearing.   HENT:      Head: Normocephalic and atraumatic.      Right Ear: Tympanic membrane normal.      Left Ear: Tympanic membrane normal.      Nose: Nose normal.      Mouth/Throat:      Mouth: Mucous membranes are moist.   Eyes:      Extraocular Movements: Extraocular movements intact.      Pupils: Pupils are equal,  round, and reactive to light.   Cardiovascular:      Rate and Rhythm: Normal rate and regular rhythm.      Pulses: Normal pulses.   Pulmonary:      Effort: Pulmonary effort is normal. No respiratory distress.   Abdominal:      General: Abdomen is flat. There is no distension.   Musculoskeletal:      Cervical back: Normal range of motion.      Right lower leg: No edema.      Left lower leg: No edema.   Skin:     General: Skin is warm.   Neurological:      General: No focal deficit present.      Mental Status: She is alert and oriented to person, place, and time. Mental status is at baseline.      Comments: Noted generalized tremor   Psychiatric:         Mood and Affect: Mood normal.         Laboratory:  Recent Labs     05/03/22  1234   WBC 6.6   RBC 3.71*   HEMOGLOBIN 13.2   HEMATOCRIT 38.3   .2*   MCH 35.6*   MCHC 34.5   RDW 75.1*   PLATELETCT 294   MPV 9.5     Recent Labs     05/03/22  1234   SODIUM 138   POTASSIUM 2.5*   CHLORIDE 95*   CO2 25   GLUCOSE 88   BUN 6*   CREATININE 0.31*   CALCIUM 8.9     Recent Labs     05/03/22  1234   ALTSGPT 43   ASTSGOT 201*   ALKPHOSPHAT 279*   TBILIRUBIN 2.6*   LIPASE 23   GLUCOSE 88         No results for input(s): NTPROBNP in the last 72 hours.      Recent Labs     05/03/22  1234   TROPONINT 119*       Imaging:  CT-HEAD W/O   Final Result      1. No CT evidence of acute infarct, hemorrhage or mass.   2. Moderate global parenchymal atrophy. Chronic small vessel ischemic changes.      [id number] VIEW   Final Result      1.  Large amount of stool in the rectum.   2.  No bowel obstruction.      DX-CHEST-PORTABLE (1 VIEW)   Final Result      No acute cardiopulmonary abnormality.             X-Ray:  I have personally reviewed the images and compared with prior images.  EKG:  I have personally reviewed the images and compared with prior images.    Assessment/Plan:  Justification for Admission Status  I anticipate this patient is appropriate for observation status at this  time because For electrolyte disturbance, hypokalemia, alcohol intoxication, intractable nausea and vomiting    * Alcoholic intoxication with complication (HCC)  Assessment & Plan    -Cardiac monitoring  On CIWA   -Monitor electrolytes and renal function  -Check CPK, magnesium, phosphorus      Prolongation of QRS complex on electrocardiography  Assessment & Plan  Avoid QT prolongation medication    Troponin I above reference range  Assessment & Plan  Patient denying chest pain.  Troponin elevation likely from demand supply mismatch  -Admit to telemetry  -Follow serial troponin/CK-MB/EKG  -Transthoracic echocardiograph to identify regional wall motion abnormalities and assess LV function      Urinary tract infection without hematuria  Assessment & Plan  Continue on Rocephin  Follow urine culture    Transaminitis  Assessment & Plan  Alcohol induced  Continue monitor  Get right upper quadrant ultrasound  Consult for alcohol abstinence    Dehydration  Assessment & Plan  Continue IV fluid    Nausea & vomiting  Assessment & Plan  Intractable nausea and vomiting  Continue IV fluid  On antiemetic    Hypokalemia- (present on admission)  Assessment & Plan  Started on IV and oral KCl  Repeat labs later      VTE prophylaxis: SCDs/TEDs and Xarelto 10 mg daily as prophylaxis

## 2022-05-04 ENCOUNTER — HOSPITAL ENCOUNTER (INPATIENT)
Facility: MEDICAL CENTER | Age: 62
LOS: 4 days | DRG: 897 | End: 2022-05-08
Attending: STUDENT IN AN ORGANIZED HEALTH CARE EDUCATION/TRAINING PROGRAM | Admitting: STUDENT IN AN ORGANIZED HEALTH CARE EDUCATION/TRAINING PROGRAM
Payer: MEDICAID

## 2022-05-04 ENCOUNTER — APPOINTMENT (OUTPATIENT)
Dept: RADIOLOGY | Facility: MEDICAL CENTER | Age: 62
DRG: 897 | End: 2022-05-04
Attending: STUDENT IN AN ORGANIZED HEALTH CARE EDUCATION/TRAINING PROGRAM
Payer: MEDICAID

## 2022-05-04 VITALS
SYSTOLIC BLOOD PRESSURE: 105 MMHG | RESPIRATION RATE: 14 BRPM | OXYGEN SATURATION: 94 % | HEART RATE: 77 BPM | TEMPERATURE: 97.3 F | HEIGHT: 66 IN | WEIGHT: 100 LBS | BODY MASS INDEX: 16.07 KG/M2 | DIASTOLIC BLOOD PRESSURE: 66 MMHG

## 2022-05-04 DIAGNOSIS — R53.1 GENERALIZED WEAKNESS: ICD-10-CM

## 2022-05-04 DIAGNOSIS — F10.239 ALCOHOL DEPENDENCE WITH WITHDRAWAL WITH COMPLICATION (HCC): ICD-10-CM

## 2022-05-04 PROBLEM — F10.930 ALCOHOL WITHDRAWAL SYNDROME, UNCOMPLICATED (HCC): Status: ACTIVE | Noted: 2022-05-04

## 2022-05-04 PROBLEM — A41.9 SEPSIS (HCC): Status: ACTIVE | Noted: 2022-05-04

## 2022-05-04 PROBLEM — A41.9 SEPSIS (HCC): Status: RESOLVED | Noted: 2022-05-04 | Resolved: 2022-05-04

## 2022-05-04 PROBLEM — F10.939 ALCOHOL WITHDRAWAL (HCC): Status: ACTIVE | Noted: 2022-05-03

## 2022-05-04 LAB
ANION GAP SERPL CALC-SCNC: 14 MMOL/L (ref 7–16)
BUN SERPL-MCNC: 5 MG/DL (ref 8–22)
CALCIUM SERPL-MCNC: 8.1 MG/DL (ref 8.5–10.5)
CHLORIDE SERPL-SCNC: 98 MMOL/L (ref 96–112)
CO2 SERPL-SCNC: 22 MMOL/L (ref 20–33)
CREAT SERPL-MCNC: 0.24 MG/DL (ref 0.5–1.4)
EKG IMPRESSION: NORMAL
ERYTHROCYTE [DISTWIDTH] IN BLOOD BY AUTOMATED COUNT: 73.9 FL (ref 35.9–50)
GFR SERPLBLD CREATININE-BSD FMLA CKD-EPI: 127 ML/MIN/1.73 M 2
GLUCOSE SERPL-MCNC: 114 MG/DL (ref 65–99)
HCT VFR BLD AUTO: 31.2 % (ref 37–47)
HGB BLD-MCNC: 10.6 G/DL (ref 12–16)
LACTATE BLD-SCNC: 1.9 MMOL/L (ref 0.5–2)
LACTATE BLD-SCNC: 5 MMOL/L (ref 0.5–2)
MAGNESIUM SERPL-MCNC: 1.7 MG/DL (ref 1.5–2.5)
MCH RBC QN AUTO: 35.2 PG (ref 27–33)
MCHC RBC AUTO-ENTMCNC: 34 G/DL (ref 33.6–35)
MCV RBC AUTO: 103.7 FL (ref 81.4–97.8)
PLATELET # BLD AUTO: 185 K/UL (ref 164–446)
PMV BLD AUTO: 9.8 FL (ref 9–12.9)
POTASSIUM SERPL-SCNC: 3.6 MMOL/L (ref 3.6–5.5)
RBC # BLD AUTO: 3.01 M/UL (ref 4.2–5.4)
SODIUM SERPL-SCNC: 134 MMOL/L (ref 135–145)
TROPONIN T SERPL-MCNC: 110 NG/L (ref 6–19)
WBC # BLD AUTO: 5.7 K/UL (ref 4.8–10.8)

## 2022-05-04 PROCEDURE — A9270 NON-COVERED ITEM OR SERVICE: HCPCS | Performed by: HOSPITALIST

## 2022-05-04 PROCEDURE — 700102 HCHG RX REV CODE 250 W/ 637 OVERRIDE(OP): Performed by: INTERNAL MEDICINE

## 2022-05-04 PROCEDURE — 770020 HCHG ROOM/CARE - TELE (206)

## 2022-05-04 PROCEDURE — 83605 ASSAY OF LACTIC ACID: CPT

## 2022-05-04 PROCEDURE — 93010 ELECTROCARDIOGRAM REPORT: CPT | Performed by: INTERNAL MEDICINE

## 2022-05-04 PROCEDURE — 700102 HCHG RX REV CODE 250 W/ 637 OVERRIDE(OP): Performed by: HOSPITALIST

## 2022-05-04 PROCEDURE — 85027 COMPLETE CBC AUTOMATED: CPT

## 2022-05-04 PROCEDURE — 80048 BASIC METABOLIC PNL TOTAL CA: CPT

## 2022-05-04 PROCEDURE — 80307 DRUG TEST PRSMV CHEM ANLYZR: CPT

## 2022-05-04 PROCEDURE — G0378 HOSPITAL OBSERVATION PER HR: HCPCS

## 2022-05-04 PROCEDURE — 700105 HCHG RX REV CODE 258: Performed by: INTERNAL MEDICINE

## 2022-05-04 PROCEDURE — 36415 COLL VENOUS BLD VENIPUNCTURE: CPT

## 2022-05-04 PROCEDURE — 700102 HCHG RX REV CODE 250 W/ 637 OVERRIDE(OP): Performed by: STUDENT IN AN ORGANIZED HEALTH CARE EDUCATION/TRAINING PROGRAM

## 2022-05-04 PROCEDURE — 99223 1ST HOSP IP/OBS HIGH 75: CPT | Mod: AI | Performed by: HOSPITALIST

## 2022-05-04 PROCEDURE — A9270 NON-COVERED ITEM OR SERVICE: HCPCS | Performed by: INTERNAL MEDICINE

## 2022-05-04 PROCEDURE — 96367 TX/PROPH/DG ADDL SEQ IV INF: CPT

## 2022-05-04 PROCEDURE — 700111 HCHG RX REV CODE 636 W/ 250 OVERRIDE (IP): Performed by: INTERNAL MEDICINE

## 2022-05-04 PROCEDURE — 84484 ASSAY OF TROPONIN QUANT: CPT

## 2022-05-04 PROCEDURE — 700111 HCHG RX REV CODE 636 W/ 250 OVERRIDE (IP): Performed by: HOSPITALIST

## 2022-05-04 PROCEDURE — 83735 ASSAY OF MAGNESIUM: CPT

## 2022-05-04 PROCEDURE — A9270 NON-COVERED ITEM OR SERVICE: HCPCS | Performed by: STUDENT IN AN ORGANIZED HEALTH CARE EDUCATION/TRAINING PROGRAM

## 2022-05-04 PROCEDURE — 306637 HCHG MISC ORTHO ITEM RC 0274

## 2022-05-04 PROCEDURE — HZ2ZZZZ DETOXIFICATION SERVICES FOR SUBSTANCE ABUSE TREATMENT: ICD-10-PCS | Performed by: HOSPITALIST

## 2022-05-04 PROCEDURE — 76705 ECHO EXAM OF ABDOMEN: CPT

## 2022-05-04 PROCEDURE — 94760 N-INVAS EAR/PLS OXIMETRY 1: CPT

## 2022-05-04 PROCEDURE — 700105 HCHG RX REV CODE 258: Performed by: HOSPITALIST

## 2022-05-04 PROCEDURE — 96366 THER/PROPH/DIAG IV INF ADDON: CPT

## 2022-05-04 PROCEDURE — 99233 SBSQ HOSP IP/OBS HIGH 50: CPT | Performed by: INTERNAL MEDICINE

## 2022-05-04 RX ORDER — PROCHLORPERAZINE EDISYLATE 5 MG/ML
5-10 INJECTION INTRAMUSCULAR; INTRAVENOUS EVERY 4 HOURS PRN
Status: DISCONTINUED | OUTPATIENT
Start: 2022-05-04 | End: 2022-05-08 | Stop reason: HOSPADM

## 2022-05-04 RX ORDER — LORAZEPAM 1 MG/1
3 TABLET ORAL
Status: DISCONTINUED | OUTPATIENT
Start: 2022-05-04 | End: 2022-05-08

## 2022-05-04 RX ORDER — OXYBUTYNIN CHLORIDE 5 MG/1
5 TABLET ORAL 2 TIMES DAILY
Status: DISCONTINUED | OUTPATIENT
Start: 2022-05-04 | End: 2022-05-07

## 2022-05-04 RX ORDER — LORAZEPAM 1 MG/1
4 TABLET ORAL
Status: DISCONTINUED | OUTPATIENT
Start: 2022-05-04 | End: 2022-05-08

## 2022-05-04 RX ORDER — LORAZEPAM 1 MG/1
2 TABLET ORAL
Status: DISCONTINUED | OUTPATIENT
Start: 2022-05-04 | End: 2022-05-08

## 2022-05-04 RX ORDER — LORAZEPAM 2 MG/ML
1.5 INJECTION INTRAMUSCULAR
Status: DISCONTINUED | OUTPATIENT
Start: 2022-05-04 | End: 2022-05-08

## 2022-05-04 RX ORDER — AMOXICILLIN 250 MG
2 CAPSULE ORAL 2 TIMES DAILY
Status: DISCONTINUED | OUTPATIENT
Start: 2022-05-04 | End: 2022-05-08 | Stop reason: HOSPADM

## 2022-05-04 RX ORDER — ONDANSETRON 2 MG/ML
4 INJECTION INTRAMUSCULAR; INTRAVENOUS EVERY 4 HOURS PRN
Status: DISCONTINUED | OUTPATIENT
Start: 2022-05-04 | End: 2022-05-08 | Stop reason: HOSPADM

## 2022-05-04 RX ORDER — GAUZE BANDAGE 2" X 2"
100 BANDAGE TOPICAL DAILY
Status: DISCONTINUED | OUTPATIENT
Start: 2022-05-05 | End: 2022-05-08 | Stop reason: HOSPADM

## 2022-05-04 RX ORDER — PROMETHAZINE HYDROCHLORIDE 25 MG/1
12.5-25 TABLET ORAL EVERY 4 HOURS PRN
Status: DISCONTINUED | OUTPATIENT
Start: 2022-05-04 | End: 2022-05-08 | Stop reason: HOSPADM

## 2022-05-04 RX ORDER — POLYETHYLENE GLYCOL 3350 17 G/17G
1 POWDER, FOR SOLUTION ORAL
Status: DISCONTINUED | OUTPATIENT
Start: 2022-05-04 | End: 2022-05-08 | Stop reason: HOSPADM

## 2022-05-04 RX ORDER — SODIUM CHLORIDE 9 MG/ML
INJECTION, SOLUTION INTRAVENOUS CONTINUOUS
Status: ACTIVE | OUTPATIENT
Start: 2022-05-04 | End: 2022-05-04

## 2022-05-04 RX ORDER — ONDANSETRON 4 MG/1
4 TABLET, ORALLY DISINTEGRATING ORAL EVERY 4 HOURS PRN
Status: DISCONTINUED | OUTPATIENT
Start: 2022-05-04 | End: 2022-05-08 | Stop reason: HOSPADM

## 2022-05-04 RX ORDER — LORAZEPAM 2 MG/ML
2 INJECTION INTRAMUSCULAR
Status: DISCONTINUED | OUTPATIENT
Start: 2022-05-04 | End: 2022-05-08

## 2022-05-04 RX ORDER — LORAZEPAM 2 MG/ML
0.5 INJECTION INTRAMUSCULAR EVERY 4 HOURS PRN
Status: DISCONTINUED | OUTPATIENT
Start: 2022-05-04 | End: 2022-05-08

## 2022-05-04 RX ORDER — LORAZEPAM 2 MG/ML
1 INJECTION INTRAMUSCULAR
Status: DISCONTINUED | OUTPATIENT
Start: 2022-05-04 | End: 2022-05-08

## 2022-05-04 RX ORDER — PROMETHAZINE HYDROCHLORIDE 25 MG/1
12.5-25 SUPPOSITORY RECTAL EVERY 4 HOURS PRN
Status: DISCONTINUED | OUTPATIENT
Start: 2022-05-04 | End: 2022-05-08 | Stop reason: HOSPADM

## 2022-05-04 RX ORDER — BISACODYL 10 MG
10 SUPPOSITORY, RECTAL RECTAL
Status: DISCONTINUED | OUTPATIENT
Start: 2022-05-04 | End: 2022-05-08 | Stop reason: HOSPADM

## 2022-05-04 RX ORDER — FOLIC ACID 1 MG/1
1 TABLET ORAL DAILY
Status: DISCONTINUED | OUTPATIENT
Start: 2022-05-05 | End: 2022-05-08 | Stop reason: HOSPADM

## 2022-05-04 RX ORDER — LORAZEPAM 1 MG/1
1 TABLET ORAL EVERY 4 HOURS PRN
Status: DISCONTINUED | OUTPATIENT
Start: 2022-05-04 | End: 2022-05-08

## 2022-05-04 RX ORDER — LORAZEPAM 0.5 MG/1
0.5 TABLET ORAL EVERY 4 HOURS PRN
Status: DISCONTINUED | OUTPATIENT
Start: 2022-05-04 | End: 2022-05-08

## 2022-05-04 RX ADMIN — VANCOMYCIN HYDROCHLORIDE 125 MG: KIT ORAL at 18:29

## 2022-05-04 RX ADMIN — OXYBUTYNIN CHLORIDE 5 MG: 5 TABLET ORAL at 18:29

## 2022-05-04 RX ADMIN — THIAMINE HCL TAB 100 MG 100 MG: 100 TAB at 06:26

## 2022-05-04 RX ADMIN — SODIUM CHLORIDE: 900 INJECTION INTRAVENOUS at 18:30

## 2022-05-04 RX ADMIN — FOLIC ACID 1 MG: 1 TABLET ORAL at 06:26

## 2022-05-04 RX ADMIN — VANCOMYCIN HYDROCHLORIDE 125 MG: KIT ORAL at 12:10

## 2022-05-04 RX ADMIN — VANCOMYCIN HYDROCHLORIDE 125 MG: KIT ORAL at 23:54

## 2022-05-04 RX ADMIN — RIVAROXABAN 10 MG: 10 TABLET, FILM COATED ORAL at 18:35

## 2022-05-04 RX ADMIN — LORAZEPAM 0.5 MG: 1 TABLET ORAL at 10:38

## 2022-05-04 RX ADMIN — LORAZEPAM 0.5 MG: 0.5 TABLET ORAL at 19:23

## 2022-05-04 RX ADMIN — CEFTRIAXONE SODIUM 1 G: 1 INJECTION, POWDER, FOR SOLUTION INTRAMUSCULAR; INTRAVENOUS at 18:33

## 2022-05-04 RX ADMIN — PIPERACILLIN AND TAZOBACTAM 3.38 G: 3; .375 INJECTION, POWDER, LYOPHILIZED, FOR SOLUTION INTRAVENOUS; PARENTERAL at 10:39

## 2022-05-04 ASSESSMENT — ENCOUNTER SYMPTOMS
SPUTUM PRODUCTION: 0
WHEEZING: 0
BRUISES/BLEEDS EASILY: 0
BLOOD IN STOOL: 0
DIARRHEA: 1
CHILLS: 0
TREMORS: 1
NAUSEA: 0
FEVER: 0
HEADACHES: 0
SORE THROAT: 0
SHORTNESS OF BREATH: 0
SEIZURES: 0
DIZZINESS: 0
PALPITATIONS: 0
VOMITING: 0
COUGH: 0
NECK PAIN: 0
DIAPHORESIS: 0
MYALGIAS: 0
BACK PAIN: 0
FOCAL WEAKNESS: 0
BLURRED VISION: 0
ABDOMINAL PAIN: 0
FLANK PAIN: 0

## 2022-05-04 ASSESSMENT — COGNITIVE AND FUNCTIONAL STATUS - GENERAL
HELP NEEDED FOR BATHING: A LITTLE
CLIMB 3 TO 5 STEPS WITH RAILING: A LOT
TOILETING: A LITTLE
MOVING TO AND FROM BED TO CHAIR: A LITTLE
DRESSING REGULAR LOWER BODY CLOTHING: A LITTLE
DAILY ACTIVITIY SCORE: 21
SUGGESTED CMS G CODE MODIFIER DAILY ACTIVITY: CJ
MOVING FROM LYING ON BACK TO SITTING ON SIDE OF FLAT BED: A LITTLE
WALKING IN HOSPITAL ROOM: A LOT
MOBILITY SCORE: 16
SUGGESTED CMS G CODE MODIFIER MOBILITY: CK
STANDING UP FROM CHAIR USING ARMS: A LOT

## 2022-05-04 ASSESSMENT — LIFESTYLE VARIABLES
HEADACHE, FULLNESS IN HEAD: VERY MILD
HAVE YOU EVER FELT YOU SHOULD CUT DOWN ON YOUR DRINKING: YES
ANXIETY: MILDLY ANXIOUS
ANXIETY: *
TOTAL SCORE: 4
ANXIETY: NO ANXIETY (AT EASE)
NAUSEA AND VOMITING: NO NAUSEA AND NO VOMITING
VISUAL DISTURBANCES: NOT PRESENT
NAUSEA AND VOMITING: NO NAUSEA AND NO VOMITING
NAUSEA AND VOMITING: NO NAUSEA AND NO VOMITING
ORIENTATION AND CLOUDING OF SENSORIUM: ORIENTED AND CAN DO SERIAL ADDITIONS
HEADACHE, FULLNESS IN HEAD: MILD
VISUAL DISTURBANCES: MILD SENSITIVITY
TREMOR: TREMOR NOT VISIBLE BUT CAN BE FELT, FINGERTIP TO FINGERTIP
TOTAL SCORE: 5
TOTAL SCORE: 4
AGITATION: NORMAL ACTIVITY
AUDITORY DISTURBANCES: NOT PRESENT
HEADACHE, FULLNESS IN HEAD: NOT PRESENT
VISUAL DISTURBANCES: NOT PRESENT
NAUSEA AND VOMITING: MILD NAUSEA WITH NO VOMITING
AUDITORY DISTURBANCES: NOT PRESENT
PAROXYSMAL SWEATS: NO SWEAT VISIBLE
TOTAL SCORE: 4
ORIENTATION AND CLOUDING OF SENSORIUM: ORIENTED AND CAN DO SERIAL ADDITIONS
AGITATION: NORMAL ACTIVITY
HAVE PEOPLE ANNOYED YOU BY CRITICIZING YOUR DRINKING: YES
HAVE PEOPLE ANNOYED YOU BY CRITICIZING YOUR DRINKING: YES
AUDITORY DISTURBANCES: NOT PRESENT
SUBSTANCE_ABUSE: 1
AUDITORY DISTURBANCES: NOT PRESENT
HEADACHE, FULLNESS IN HEAD: VERY MILD
EVER HAD A DRINK FIRST THING IN THE MORNING TO STEADY YOUR NERVES TO GET RID OF A HANGOVER: YES
HEADACHE, FULLNESS IN HEAD: VERY MILD
VISUAL DISTURBANCES: NOT PRESENT
TOTAL SCORE: 4
AGITATION: NORMAL ACTIVITY
TOTAL SCORE: 4
PAROXYSMAL SWEATS: NO SWEAT VISIBLE
ALCOHOL_USE: YES
TREMOR: *
VISUAL DISTURBANCES: NOT PRESENT
TOTAL SCORE: 4
ANXIETY: MILDLY ANXIOUS
ORIENTATION AND CLOUDING OF SENSORIUM: ORIENTED AND CAN DO SERIAL ADDITIONS
CONSUMPTION TOTAL: POSITIVE
TREMOR: TREMOR NOT VISIBLE BUT CAN BE FELT, FINGERTIP TO FINGERTIP
VISUAL DISTURBANCES: NOT PRESENT
HAVE YOU EVER FELT YOU SHOULD CUT DOWN ON YOUR DRINKING: YES
AGITATION: NORMAL ACTIVITY
ORIENTATION AND CLOUDING OF SENSORIUM: ORIENTED AND CAN DO SERIAL ADDITIONS
AUDITORY DISTURBANCES: NOT PRESENT
EVER FELT BAD OR GUILTY ABOUT YOUR DRINKING: YES
AGITATION: NORMAL ACTIVITY
EVER FELT BAD OR GUILTY ABOUT YOUR DRINKING: YES
PAROXYSMAL SWEATS: NO SWEAT VISIBLE
ANXIETY: MILDLY ANXIOUS
PAROXYSMAL SWEATS: NO SWEAT VISIBLE
TOTAL SCORE: 6
AUDITORY DISTURBANCES: NOT PRESENT
PAROXYSMAL SWEATS: NO SWEAT VISIBLE
ON A TYPICAL DAY WHEN YOU DRINK ALCOHOL HOW MANY DRINKS DO YOU HAVE: 4
TOTAL SCORE: 3
ANXIETY: MILDLY ANXIOUS
AGITATION: NORMAL ACTIVITY
ALCOHOL_USE: YES
EVER_SMOKED: YES
CONSUMPTION TOTAL: INCOMPLETE
AVERAGE NUMBER OF DAYS PER WEEK YOU HAVE A DRINK CONTAINING ALCOHOL: 5
HOW MANY TIMES IN THE PAST YEAR HAVE YOU HAD 5 OR MORE DRINKS IN A DAY: 250
TOTAL SCORE: 3
TOTAL SCORE: 6
TOTAL SCORE: 5
AUDITORY DISTURBANCES: NOT PRESENT
ORIENTATION AND CLOUDING OF SENSORIUM: ORIENTED AND CAN DO SERIAL ADDITIONS
NAUSEA AND VOMITING: *
TOTAL SCORE: 3
PAROXYSMAL SWEATS: NO SWEAT VISIBLE
NAUSEA AND VOMITING: NO NAUSEA AND NO VOMITING
TREMOR: TREMOR NOT VISIBLE BUT CAN BE FELT, FINGERTIP TO FINGERTIP
HEADACHE, FULLNESS IN HEAD: MODERATE
EVER HAD A DRINK FIRST THING IN THE MORNING TO STEADY YOUR NERVES TO GET RID OF A HANGOVER: YES
ORIENTATION AND CLOUDING OF SENSORIUM: ORIENTED AND CAN DO SERIAL ADDITIONS
ORIENTATION AND CLOUDING OF SENSORIUM: ORIENTED AND CAN DO SERIAL ADDITIONS
VISUAL DISTURBANCES: NOT PRESENT
PAROXYSMAL SWEATS: NO SWEAT VISIBLE
ANXIETY: MILDLY ANXIOUS
TREMOR: TREMOR NOT VISIBLE BUT CAN BE FELT, FINGERTIP TO FINGERTIP
TREMOR: *
HEADACHE, FULLNESS IN HEAD: VERY MILD
TREMOR: TREMOR NOT VISIBLE BUT CAN BE FELT, FINGERTIP TO FINGERTIP
NAUSEA AND VOMITING: NO NAUSEA AND NO VOMITING
AGITATION: SOMEWHAT MORE THAN NORMAL ACTIVITY

## 2022-05-04 ASSESSMENT — COPD QUESTIONNAIRES
DO YOU EVER COUGH UP ANY MUCUS OR PHLEGM?: NO/ONLY WITH OCCASIONAL COLDS OR INFECTIONS
HAVE YOU SMOKED AT LEAST 100 CIGARETTES IN YOUR ENTIRE LIFE: YES
DURING THE PAST 4 WEEKS HOW MUCH DID YOU FEEL SHORT OF BREATH: NONE/LITTLE OF THE TIME
COPD SCREENING SCORE: 4

## 2022-05-04 ASSESSMENT — PAIN DESCRIPTION - PAIN TYPE: TYPE: ACUTE PAIN

## 2022-05-04 ASSESSMENT — PATIENT HEALTH QUESTIONNAIRE - PHQ9
SUM OF ALL RESPONSES TO PHQ9 QUESTIONS 1 AND 2: 0
1. LITTLE INTEREST OR PLEASURE IN DOING THINGS: NOT AT ALL
2. FEELING DOWN, DEPRESSED, IRRITABLE, OR HOPELESS: NOT AT ALL

## 2022-05-04 ASSESSMENT — FIBROSIS 4 INDEX: FIB4 SCORE: 10.11

## 2022-05-04 NOTE — PROGRESS NOTES
4 Eyes Skin Assessment Completed by STANLEY Kwan and STANLEY Quick.    Head WDL  Ears WDL  Nose WDL  Mouth WDL  Neck WDL  Breast/Chest WDL  Shoulder Blades WDL  Spine WDL  (R) Arm/Elbow/Hand WDL  (L) Arm/Elbow/Hand WDL  Abdomen WDL  Groin Redness and Blanching  Scrotum/Coccyx/Buttocks Redness and Blanching  (R) Leg WDL  (L) Leg WDL  (R) Heel/Foot/Toe WDL  (L) Heel/Foot/Toe WDL          Devices In Places Tele Box      Interventions In Place Pillows    Possible Skin Injury No    Pictures Uploaded Into Epic N/A  Wound Consult Placed N/A  RN Wound Prevention Protocol Ordered No

## 2022-05-04 NOTE — ED NOTES
Collected labs.    Rounded on Pt.    Updated Pt on plan of care. Pt verbalized understanding.  No acute distress at this time.  Will continue to monitor.

## 2022-05-04 NOTE — ED NOTES
Kasandra MONTOYA notified of critically high lactic acid of 5.4. Orders for another 1L of NS then continuous fluids as ordered.

## 2022-05-04 NOTE — ED NOTES
Received report; assumed care of Pt.    Introduced self to Pt; informed her that I am part of her care team.  Rounded on Pt. Updated Pt on plan of care. Pt verbalized understanding.  No acute distress at this time.  Will continue to monitor.

## 2022-05-04 NOTE — PROGRESS NOTES
1505 Pt on the unit. Pt arrived to unit via gurney. Ambulated from gurney to bed, slide assist. Tele monitor applied, vitals taken. Pt assessed. A&O x4. Admit profile and med rec complete. Discussed POC with pt, including isolation. Welcome folder provided and discussed. Communication board filled out. Questions and concerns addressed, verbalized understanding. Fall precautions in place. Pt demonstrates ability to use call light appropriately. Pt left in lowest position. Bed locked and low, yes alarm on.

## 2022-05-04 NOTE — PROGRESS NOTES
Hospital Medicine Daily Progress Note    Date of Service  5/4/2022    Chief Complaint  Vianney Castro is a 62 y.o. female admitted 5/3/2022 with diarrhea and alcohol intoxication    Hospital Course  62-year-old female with a past medical history of alcohol abuse, C. difficile who presented with watery diarrhea and alcohol intoxication.  Patient is noted to have a lactic acid of 5 on admission with severe hypokalemia of 2.7    Interval Problem Update  Patient received IV fluid hydration and IV potassium replacement with improvement of potassium 3.6 and lactic acid from 5-1.9.  She was noted to have elevated liver enzymes likely secondary to alcohol induced liver injury.  Her troponins have been elevated at 113.  Denies active chest pain, 2D echo is pending.  Patient is actively withdrawing from alcohol.  She has been started on CIWA protocol.  Patient reports watery diarrhea and dysuria, she is currently on IV Zosyn and I have started her on oral vancomycin given her history of C. difficile    I have personally seen and examined the patient at bedside. I discussed the plan of care with patient.    Consultants/Specialty  None    Code Status  Full Code    Disposition  Patient is not medically cleared for discharge.   Anticipate discharge to to home with close outpatient follow-up.  I have placed the appropriate orders for post-discharge needs.    Review of Systems  Review of Systems   Constitutional: Positive for malaise/fatigue. Negative for chills, diaphoresis and fever.   HENT: Negative for hearing loss and sore throat.    Eyes: Negative for blurred vision.   Respiratory: Negative for cough, sputum production, shortness of breath and wheezing.    Cardiovascular: Negative for chest pain, palpitations and leg swelling.   Gastrointestinal: Positive for diarrhea. Negative for abdominal pain, blood in stool, nausea and vomiting.   Genitourinary: Positive for dysuria. Negative for flank pain and urgency.   Musculoskeletal:  Negative for back pain, joint pain, myalgias and neck pain.   Skin: Negative for rash.   Neurological: Positive for tremors. Negative for dizziness, focal weakness, seizures and headaches.   Endo/Heme/Allergies: Does not bruise/bleed easily.   Psychiatric/Behavioral: Positive for substance abuse. Negative for suicidal ideas.   All other systems reviewed and are negative.       Physical Exam  Temp:  [36.3 °C (97.3 °F)] 36.3 °C (97.3 °F)  Pulse:  [] 101  Resp:  [14-20] 14  BP: ()/(59-98) 112/69  SpO2:  [92 %-99 %] 97 %    Physical Exam  Vitals and nursing note reviewed.   Constitutional:       General: She is not in acute distress.     Appearance: Normal appearance.   HENT:      Head: Normocephalic and atraumatic.      Nose: Nose normal.      Mouth/Throat:      Mouth: Mucous membranes are dry.   Eyes:      Extraocular Movements: Extraocular movements intact.      Conjunctiva/sclera: Conjunctivae normal.      Pupils: Pupils are equal, round, and reactive to light.   Cardiovascular:      Rate and Rhythm: Regular rhythm. Tachycardia present.      Pulses: Normal pulses.      Heart sounds: Normal heart sounds.   Pulmonary:      Effort: Pulmonary effort is normal. No respiratory distress.      Breath sounds: Normal breath sounds. No wheezing, rhonchi or rales.   Abdominal:      General: Bowel sounds are normal. There is no distension.      Palpations: Abdomen is soft.      Tenderness: There is no abdominal tenderness.   Musculoskeletal:         General: No swelling or tenderness. Normal range of motion.      Cervical back: Normal range of motion and neck supple.   Lymphadenopathy:      Cervical: No cervical adenopathy.   Skin:     General: Skin is warm.      Coloration: Skin is not jaundiced.      Findings: No rash.   Neurological:      General: No focal deficit present.      Mental Status: She is alert and oriented to person, place, and time.      Cranial Nerves: No cranial nerve deficit.      Motor: No  weakness.   Psychiatric:         Mood and Affect: Mood normal.         Behavior: Behavior normal.         Fluids    Intake/Output Summary (Last 24 hours) at 5/4/2022 0951  Last data filed at 5/3/2022 1725  Gross per 24 hour   Intake 2100 ml   Output --   Net 2100 ml       Laboratory  Recent Labs     05/03/22  1234 05/04/22  0015   WBC 6.6 5.7   RBC 3.71* 3.01*   HEMOGLOBIN 13.2 10.6*   HEMATOCRIT 38.3 31.2*   .2* 103.7*   MCH 35.6* 35.2*   MCHC 34.5 34.0   RDW 75.1* 73.9*   PLATELETCT 294 185   MPV 9.5 9.8     Recent Labs     05/03/22  1234 05/03/22  1806 05/04/22  0015   SODIUM 138  --  134*   POTASSIUM 2.5* 2.7* 3.6   CHLORIDE 95*  --  98   CO2 25  --  22   GLUCOSE 88  --  114*   BUN 6*  --  5*   CREATININE 0.31*  --  0.24*   CALCIUM 8.9  --  8.1*                   Imaging  US-RUQ   Final Result      1.  The liver is diffusely echogenic, most compatible with fatty infiltration, however other hepatocellular disease processes are in the differential diagnosis.   2.  Contracted gallbladder with mildly thickened gallbladder wall which can be seen with underlying hepatocellular disease.      CT-HEAD W/O   Final Result      1. No CT evidence of acute infarct, hemorrhage or mass.   2. Moderate global parenchymal atrophy. Chronic small vessel ischemic changes.      [id number] VIEW   Final Result      1.  Large amount of stool in the rectum.   2.  No bowel obstruction.      DX-CHEST-PORTABLE (1 VIEW)   Final Result      No acute cardiopulmonary abnormality.         EC-ECHOCARDIOGRAM COMPLETE W/ CONT    (Results Pending)        Assessment/Plan  * Alcohol withdrawal (HCC)  Assessment & Plan  Patient will be admitted to the telemetry unit with close cardiac monitoring on CIWA protocol  Started on rally bag, multivitamin, thiamine and folate  Replete electrolytes  Patient has been counseled on alcohol cessation and will be provided with information for outpatient detox facilities      Prolongation of QRS complex on  electrocardiography  Assessment & Plan  Avoid QT prolongation medication    Troponin I above reference range  Assessment & Plan  Patient denying chest pain.  Troponin elevation likely from demand supply mismatch  -Admit to telemetry  -Follow serial troponin/CK-MB/EKG  -Transthoracic echocardiograph to identify regional wall motion abnormalities and assess LV function      Urinary tract infection without hematuria  Assessment & Plan  Continue on Zosyn  Follow urine culture    Transaminitis  Assessment & Plan  Alcohol induced  Continue monitor  Get right upper quadrant ultrasound  Consult for alcohol abstinence    Dehydration  Assessment & Plan  Continue IV fluid    Diarrhea  Assessment & Plan  Recently treated for C. difficile colitis  Check for C. difficile PCR  Started empirically on oral vancomycin since patient is receiving IV ceftriaxone for UTI      Nausea & vomiting  Assessment & Plan  Intractable nausea and vomiting  Continue IV fluid  On antiemetic    Hypokalemia- (present on admission)  Assessment & Plan  Started on IV and oral KCl  Repeat labs later       VTE prophylaxis: enoxaparin ppx    I have performed a physical exam and reviewed and updated ROS and Plan today (5/4/2022). In review of yesterday's note (5/3/2022), there are no changes except as documented above.

## 2022-05-04 NOTE — ED NOTES
Stopped KCl infusion.    Rounded on Pt.    Updated Pt on plan of care. Pt verbalized understanding.  No acute distress at this time.  Will continue to monitor.

## 2022-05-04 NOTE — DISCHARGE PLANNING
Note:  STANLEY MIRANDA spoke to Deborah from [facility]. Deborah requested for [employer] and facesheet to be faxed to [facility] at [phone]. Faxed as requested. Voalte message sent to Deborah from RT. Per Deborah, pt will go to [facility] room 301-1, phone number for report is [phone]. ER Charge RN updated. Transportation confirmation pending.

## 2022-05-04 NOTE — ASSESSMENT & PLAN NOTE
Patient denying chest pain.  Troponin elevation likely from demand supply mismatch  -Admit to telemetry  -Follow serial troponin/CK-MB/EKG  -Transthoracic echocardiograph to identify regional wall motion abnormalities and assess LV function

## 2022-05-04 NOTE — ED NOTES
Report given to STANLEY Gonsales. Pt transported from [facility] 26 to [facility] 54

## 2022-05-04 NOTE — DISCHARGE PLANNING
Note:  Received call from Pat from [facility] that MD said pt can be transferred to [facility] and RN CM will need to ask pt if he agrees with transfer. RN CM spoke to pt at bedside. Pt agreed to be transferred to [facility]. RN CM informed RTO. Per Pat, he will call back this RN RUTH for next step and details about transfer.

## 2022-05-04 NOTE — DISCHARGE INSTRUCTIONS
Discharge Instructions    Discharged to [facility] by ambulance with escort. Discharged via ambulance, hospital escort: Yes.  Special equipment needed: Not Applicable    Be sure to schedule a follow-up appointment with your primary care doctor or any specialists as instructed.     Discharge Plan:        I understand that a diet low in cholesterol, fat, and sodium is recommended for good health. Unless I have been given specific instructions below for another diet, I accept this instruction as my diet prescription.   Other diet:     Special Instructions: None    · Is patient discharged on Warfarin / Coumadin?   No     Depression / Suicide Risk    As you are discharged from this [facility], it is important to learn how to keep safe from harming yourself.    Recognize the warning signs:  · Abrupt changes in personality, positive or negative- including increase in energy   · Giving away possessions  · Change in eating patterns- significant weight changes-  positive or negative  · Change in sleeping patterns- unable to sleep or sleeping all the time   · Unwillingness or inability to communicate  · Depression  · Unusual sadness, discouragement and loneliness  · Talk of wanting to die  · Neglect of personal appearance   · Rebelliousness- reckless behavior  · Withdrawal from people/activities they love  · Confusion- inability to concentrate     If you or a loved one observes any of these behaviors or has concerns about self-harm, here's what you can do:  · Talk about it- your feelings and reasons for harming yourself  · Remove any means that you might use to hurt yourself (examples: pills, rope, extension cords, firearm)  · Get professional help from the community (Mental Health, Substance Abuse, psychological counseling)  · Do not be alone:Call your Safe Contact- someone whom you trust who will be there for you.  · Call your local CRISIS HOTLINE 459-0859 or 091-429-2960  · Call your local Children's Mobile  Crisis Response Team [place] (580) 474-3525 or www.[employer]umicity  · Call the toll free National Suicide Prevention Hotlines   · National Suicide Prevention Lifeline 926-947-XZNQ (0144)  · National Hope Line Network 800-SUICIDE (335-9107)

## 2022-05-04 NOTE — PROGRESS NOTES
Report called to RN at [facility]. Pt pending transport. Updated on transfer.

## 2022-05-05 ENCOUNTER — APPOINTMENT (OUTPATIENT)
Dept: CARDIOLOGY | Facility: MEDICAL CENTER | Age: 62
DRG: 897 | End: 2022-05-05
Attending: HOSPITALIST
Payer: MEDICAID

## 2022-05-05 PROBLEM — E87.20 LACTIC ACIDOSIS: Status: ACTIVE | Noted: 2022-05-05

## 2022-05-05 PROBLEM — R53.1 GENERALIZED WEAKNESS: Status: ACTIVE | Noted: 2022-05-05

## 2022-05-05 PROBLEM — K59.00 CONSTIPATION: Status: ACTIVE | Noted: 2022-05-05

## 2022-05-05 PROBLEM — D64.9 ANEMIA: Status: ACTIVE | Noted: 2022-05-05

## 2022-05-05 PROBLEM — E87.1 HYPONATREMIA: Status: ACTIVE | Noted: 2022-05-05

## 2022-05-05 LAB
ALBUMIN SERPL BCP-MCNC: 2.8 G/DL (ref 3.2–4.9)
ALBUMIN/GLOB SERPL: 0.9 G/DL
ALP SERPL-CCNC: 211 U/L (ref 30–99)
ALT SERPL-CCNC: 36 U/L (ref 2–50)
AMPHET UR QL SCN: NEGATIVE
ANION GAP SERPL CALC-SCNC: 13 MMOL/L (ref 7–16)
AST SERPL-CCNC: 165 U/L (ref 12–45)
BACTERIA UR CULT: NORMAL
BARBITURATES UR QL SCN: NEGATIVE
BENZODIAZ UR QL SCN: NEGATIVE
BILIRUB SERPL-MCNC: 3.2 MG/DL (ref 0.1–1.5)
BUN SERPL-MCNC: 2 MG/DL (ref 8–22)
BZE UR QL SCN: NEGATIVE
CALCIUM SERPL-MCNC: 8.7 MG/DL (ref 8.4–10.2)
CANNABINOIDS UR QL SCN: NEGATIVE
CHLORIDE SERPL-SCNC: 97 MMOL/L (ref 96–112)
CO2 SERPL-SCNC: 20 MMOL/L (ref 20–33)
CREAT SERPL-MCNC: 0.23 MG/DL (ref 0.5–1.4)
ERYTHROCYTE [DISTWIDTH] IN BLOOD BY AUTOMATED COUNT: 70.9 FL (ref 35.9–50)
FERRITIN SERPL-MCNC: 2355 NG/ML (ref 10–291)
GFR SERPLBLD CREATININE-BSD FMLA CKD-EPI: 128 ML/MIN/1.73 M 2
GLOBULIN SER CALC-MCNC: 3.2 G/DL (ref 1.9–3.5)
GLUCOSE SERPL-MCNC: 84 MG/DL (ref 65–99)
HCT VFR BLD AUTO: 30.5 % (ref 37–47)
HGB BLD-MCNC: 10.4 G/DL (ref 12–16)
IRON SATN MFR SERPL: 90 % (ref 15–55)
IRON SERPL-MCNC: 146 UG/DL (ref 40–170)
LV EJECT FRACT  99904: 55
LV EJECT FRACT MOD 2C 99903: 58.33
LV EJECT FRACT MOD 4C 99902: 54.07
LV EJECT FRACT MOD BP 99901: 49.58
MAGNESIUM SERPL-MCNC: 1.7 MG/DL (ref 1.5–2.5)
MCH RBC QN AUTO: 35.9 PG (ref 27–33)
MCHC RBC AUTO-ENTMCNC: 34.1 G/DL (ref 33.6–35)
MCV RBC AUTO: 105.2 FL (ref 81.4–97.8)
METHADONE UR QL SCN: NEGATIVE
OPIATES UR QL SCN: NEGATIVE
OXYCODONE UR QL SCN: NEGATIVE
PCP UR QL SCN: NEGATIVE
PLATELET # BLD AUTO: 148 K/UL (ref 164–446)
PMV BLD AUTO: 10.1 FL (ref 9–12.9)
POTASSIUM SERPL-SCNC: 3.1 MMOL/L (ref 3.6–5.5)
PROPOXYPH UR QL SCN: NEGATIVE
PROT SERPL-MCNC: 6 G/DL (ref 6–8.2)
RBC # BLD AUTO: 2.9 M/UL (ref 4.2–5.4)
SIGNIFICANT IND 70042: NORMAL
SITE SITE: NORMAL
SODIUM SERPL-SCNC: 130 MMOL/L (ref 135–145)
SOURCE SOURCE: NORMAL
TIBC SERPL-MCNC: 163 UG/DL (ref 250–450)
UIBC SERPL-MCNC: <17 UG/DL (ref 110–370)
VIT B12 SERPL-MCNC: 1146 PG/ML (ref 211–911)
WBC # BLD AUTO: 6.5 K/UL (ref 4.8–10.8)

## 2022-05-05 PROCEDURE — A9270 NON-COVERED ITEM OR SERVICE: HCPCS | Performed by: HOSPITALIST

## 2022-05-05 PROCEDURE — 97535 SELF CARE MNGMENT TRAINING: CPT

## 2022-05-05 PROCEDURE — 36415 COLL VENOUS BLD VENIPUNCTURE: CPT

## 2022-05-05 PROCEDURE — 700102 HCHG RX REV CODE 250 W/ 637 OVERRIDE(OP): Performed by: HOSPITALIST

## 2022-05-05 PROCEDURE — 94760 N-INVAS EAR/PLS OXIMETRY 1: CPT

## 2022-05-05 PROCEDURE — 99233 SBSQ HOSP IP/OBS HIGH 50: CPT | Performed by: INTERNAL MEDICINE

## 2022-05-05 PROCEDURE — 82607 VITAMIN B-12: CPT

## 2022-05-05 PROCEDURE — 80053 COMPREHEN METABOLIC PANEL: CPT

## 2022-05-05 PROCEDURE — 83550 IRON BINDING TEST: CPT

## 2022-05-05 PROCEDURE — 83735 ASSAY OF MAGNESIUM: CPT

## 2022-05-05 PROCEDURE — 93306 TTE W/DOPPLER COMPLETE: CPT

## 2022-05-05 PROCEDURE — 83540 ASSAY OF IRON: CPT

## 2022-05-05 PROCEDURE — 700105 HCHG RX REV CODE 258: Performed by: INTERNAL MEDICINE

## 2022-05-05 PROCEDURE — 700102 HCHG RX REV CODE 250 W/ 637 OVERRIDE(OP): Performed by: INTERNAL MEDICINE

## 2022-05-05 PROCEDURE — 700111 HCHG RX REV CODE 636 W/ 250 OVERRIDE (IP): Performed by: INTERNAL MEDICINE

## 2022-05-05 PROCEDURE — 97166 OT EVAL MOD COMPLEX 45 MIN: CPT

## 2022-05-05 PROCEDURE — 82728 ASSAY OF FERRITIN: CPT

## 2022-05-05 PROCEDURE — 770006 HCHG ROOM/CARE - MED/SURG/GYN SEMI*

## 2022-05-05 PROCEDURE — 93306 TTE W/DOPPLER COMPLETE: CPT | Mod: 26 | Performed by: INTERNAL MEDICINE

## 2022-05-05 PROCEDURE — 85027 COMPLETE CBC AUTOMATED: CPT

## 2022-05-05 PROCEDURE — A9270 NON-COVERED ITEM OR SERVICE: HCPCS | Performed by: INTERNAL MEDICINE

## 2022-05-05 RX ORDER — SODIUM CHLORIDE, SODIUM LACTATE, POTASSIUM CHLORIDE, CALCIUM CHLORIDE 600; 310; 30; 20 MG/100ML; MG/100ML; MG/100ML; MG/100ML
INJECTION, SOLUTION INTRAVENOUS CONTINUOUS
Status: DISCONTINUED | OUTPATIENT
Start: 2022-05-05 | End: 2022-05-08 | Stop reason: HOSPADM

## 2022-05-05 RX ORDER — NITROFURANTOIN 25; 75 MG/1; MG/1
100 CAPSULE ORAL 2 TIMES DAILY WITH MEALS
Status: DISCONTINUED | OUTPATIENT
Start: 2022-05-05 | End: 2022-05-08 | Stop reason: HOSPADM

## 2022-05-05 RX ORDER — MAGNESIUM SULFATE 1 G/100ML
1 INJECTION INTRAVENOUS ONCE
Status: COMPLETED | OUTPATIENT
Start: 2022-05-05 | End: 2022-05-05

## 2022-05-05 RX ORDER — POTASSIUM CHLORIDE 20 MEQ/1
40 TABLET, EXTENDED RELEASE ORAL ONCE
Status: COMPLETED | OUTPATIENT
Start: 2022-05-05 | End: 2022-05-05

## 2022-05-05 RX ADMIN — THIAMINE HCL TAB 100 MG 100 MG: 100 TAB at 05:43

## 2022-05-05 RX ADMIN — SODIUM CHLORIDE, POTASSIUM CHLORIDE, SODIUM LACTATE AND CALCIUM CHLORIDE: 600; 310; 30; 20 INJECTION, SOLUTION INTRAVENOUS at 09:32

## 2022-05-05 RX ADMIN — LORAZEPAM 1 MG: 1 TABLET ORAL at 02:19

## 2022-05-05 RX ADMIN — OXYBUTYNIN CHLORIDE 5 MG: 5 TABLET ORAL at 05:44

## 2022-05-05 RX ADMIN — LORAZEPAM 0.5 MG: 0.5 TABLET ORAL at 20:06

## 2022-05-05 RX ADMIN — POTASSIUM CHLORIDE 40 MEQ: 1500 TABLET, EXTENDED RELEASE ORAL at 06:27

## 2022-05-05 RX ADMIN — VANCOMYCIN HYDROCHLORIDE 125 MG: KIT ORAL at 05:44

## 2022-05-05 RX ADMIN — LORAZEPAM 0.5 MG: 0.5 TABLET ORAL at 11:45

## 2022-05-05 RX ADMIN — MAGNESIUM SULFATE 1 G: 1 INJECTION INTRAVENOUS at 06:27

## 2022-05-05 RX ADMIN — RIVAROXABAN 10 MG: 10 TABLET, FILM COATED ORAL at 16:37

## 2022-05-05 RX ADMIN — SENNOSIDES AND DOCUSATE SODIUM 2 TABLET: 50; 8.6 TABLET ORAL at 05:44

## 2022-05-05 RX ADMIN — SODIUM CHLORIDE, POTASSIUM CHLORIDE, SODIUM LACTATE AND CALCIUM CHLORIDE: 600; 310; 30; 20 INJECTION, SOLUTION INTRAVENOUS at 22:22

## 2022-05-05 RX ADMIN — FOLIC ACID 1 MG: 1 TABLET ORAL at 05:44

## 2022-05-05 RX ADMIN — NITROFURANTOIN (MONOHYDRATE/MACROCRYSTALS) 100 MG: 75; 25 CAPSULE ORAL at 16:37

## 2022-05-05 RX ADMIN — OXYBUTYNIN CHLORIDE 5 MG: 5 TABLET ORAL at 16:37

## 2022-05-05 RX ADMIN — LORAZEPAM 2 MG: 1 TABLET ORAL at 23:03

## 2022-05-05 ASSESSMENT — LIFESTYLE VARIABLES
NAUSEA AND VOMITING: MILD NAUSEA WITH NO VOMITING
AUDITORY DISTURBANCES: NOT PRESENT
PAROXYSMAL SWEATS: BARELY PERCEPTIBLE SWEATING. PALMS MOIST
TOTAL SCORE: 12
AUDITORY DISTURBANCES: NOT PRESENT
HEADACHE, FULLNESS IN HEAD: MILD
ORIENTATION AND CLOUDING OF SENSORIUM: ORIENTED AND CAN DO SERIAL ADDITIONS
TOTAL SCORE: 4
TREMOR: TREMOR NOT VISIBLE BUT CAN BE FELT, FINGERTIP TO FINGERTIP
VISUAL DISTURBANCES: NOT PRESENT
TREMOR: TREMOR NOT VISIBLE BUT CAN BE FELT, FINGERTIP TO FINGERTIP
TREMOR: TREMOR NOT VISIBLE BUT CAN BE FELT, FINGERTIP TO FINGERTIP
VISUAL DISTURBANCES: NOT PRESENT
ORIENTATION AND CLOUDING OF SENSORIUM: ORIENTED AND CAN DO SERIAL ADDITIONS
AGITATION: *
ANXIETY: NO ANXIETY (AT EASE)
TREMOR: *
TOTAL SCORE: 5
VISUAL DISTURBANCES: NOT PRESENT
PAROXYSMAL SWEATS: NO SWEAT VISIBLE
ORIENTATION AND CLOUDING OF SENSORIUM: ORIENTED AND CAN DO SERIAL ADDITIONS
PAROXYSMAL SWEATS: BARELY PERCEPTIBLE SWEATING. PALMS MOIST
AGITATION: NORMAL ACTIVITY
ORIENTATION AND CLOUDING OF SENSORIUM: ORIENTED AND CAN DO SERIAL ADDITIONS
SUBSTANCE_ABUSE: 0
ANXIETY: *
AUDITORY DISTURBANCES: NOT PRESENT
HEADACHE, FULLNESS IN HEAD: VERY MILD
NAUSEA AND VOMITING: NO NAUSEA AND NO VOMITING
ORIENTATION AND CLOUDING OF SENSORIUM: ORIENTED AND CAN DO SERIAL ADDITIONS
NAUSEA AND VOMITING: MILD NAUSEA WITH NO VOMITING
AGITATION: NORMAL ACTIVITY
TOTAL SCORE: 6
VISUAL DISTURBANCES: NOT PRESENT
ORIENTATION AND CLOUDING OF SENSORIUM: ORIENTED AND CAN DO SERIAL ADDITIONS
NAUSEA AND VOMITING: NO NAUSEA AND NO VOMITING
PAROXYSMAL SWEATS: NO SWEAT VISIBLE
ANXIETY: MILDLY ANXIOUS
AGITATION: SOMEWHAT MORE THAN NORMAL ACTIVITY
NAUSEA AND VOMITING: NO NAUSEA AND NO VOMITING
NAUSEA AND VOMITING: NO NAUSEA AND NO VOMITING
PAROXYSMAL SWEATS: BARELY PERCEPTIBLE SWEATING. PALMS MOIST
HEADACHE, FULLNESS IN HEAD: VERY MILD
TOTAL SCORE: 3
VISUAL DISTURBANCES: NOT PRESENT
ANXIETY: MODERATELY ANXIOUS OR GUARDED, SO ANXIETY IS INFERRED
AGITATION: NORMAL ACTIVITY
PAROXYSMAL SWEATS: BARELY PERCEPTIBLE SWEATING. PALMS MOIST
TOTAL SCORE: 2
TOTAL SCORE: 8
VISUAL DISTURBANCES: NOT PRESENT
AUDITORY DISTURBANCES: NOT PRESENT
AUDITORY DISTURBANCES: NOT PRESENT
TREMOR: TREMOR NOT VISIBLE BUT CAN BE FELT, FINGERTIP TO FINGERTIP
HEADACHE, FULLNESS IN HEAD: VERY MILD
ORIENTATION AND CLOUDING OF SENSORIUM: ORIENTED AND CAN DO SERIAL ADDITIONS
TREMOR: TREMOR NOT VISIBLE BUT CAN BE FELT, FINGERTIP TO FINGERTIP
AUDITORY DISTURBANCES: NOT PRESENT
AGITATION: *
AGITATION: SOMEWHAT MORE THAN NORMAL ACTIVITY
HEADACHE, FULLNESS IN HEAD: VERY MILD
TREMOR: TREMOR NOT VISIBLE BUT CAN BE FELT, FINGERTIP TO FINGERTIP
AUDITORY DISTURBANCES: NOT PRESENT
VISUAL DISTURBANCES: NOT PRESENT
ANXIETY: MILDLY ANXIOUS
ANXIETY: MILDLY ANXIOUS
PAROXYSMAL SWEATS: BARELY PERCEPTIBLE SWEATING. PALMS MOIST
ANXIETY: NO ANXIETY (AT EASE)
HEADACHE, FULLNESS IN HEAD: VERY MILD
NAUSEA AND VOMITING: *
HEADACHE, FULLNESS IN HEAD: VERY MILD

## 2022-05-05 ASSESSMENT — ENCOUNTER SYMPTOMS
COUGH: 0
HEMOPTYSIS: 0
MYALGIAS: 0
ABDOMINAL PAIN: 1
VOMITING: 0
NAUSEA: 0
SHORTNESS OF BREATH: 0
DOUBLE VISION: 0
DIZZINESS: 0
CONSTIPATION: 1
BLOOD IN STOOL: 0
SINUS PAIN: 0
HEADACHES: 0
FALLS: 0
CLAUDICATION: 0
HEARTBURN: 0
PND: 0
BACK PAIN: 0
WHEEZING: 0
CHILLS: 0
NERVOUS/ANXIOUS: 0
ABDOMINAL PAIN: 0
DEPRESSION: 0
PALPITATIONS: 0
BLURRED VISION: 0
FEVER: 0
BRUISES/BLEEDS EASILY: 0

## 2022-05-05 ASSESSMENT — COGNITIVE AND FUNCTIONAL STATUS - GENERAL
HELP NEEDED FOR BATHING: A LOT
DRESSING REGULAR LOWER BODY CLOTHING: A LOT
DRESSING REGULAR UPPER BODY CLOTHING: A LITTLE
DAILY ACTIVITIY SCORE: 17
SUGGESTED CMS G CODE MODIFIER DAILY ACTIVITY: CK
TOILETING: A LITTLE
PERSONAL GROOMING: A LITTLE

## 2022-05-05 ASSESSMENT — FIBROSIS 4 INDEX: FIB4 SCORE: 11.33

## 2022-05-05 ASSESSMENT — GAIT ASSESSMENTS: DISTANCE (FEET): 20

## 2022-05-05 ASSESSMENT — ACTIVITIES OF DAILY LIVING (ADL): TOILETING: REQUIRES ASSIST

## 2022-05-05 NOTE — H&P
Hospital Medicine History & Physical Note    Date of Service  5/4/2022    Primary Care Physician  Pcp Pt States None    Consultants  none    Code Status  Full Code    Chief Complaint  Abdominal pain.     History of Presenting Illness  Vianney Castro is a 62 y.o. female who presented 5/4/2022 with past medical history of alcohol abuse, C. difficile colitis, presenting to [facility] with complaining of generalized weakness abdominal pain nausea vomiting dysuria and frequency, patient apparently also having multiple falls, she denies any focal weakness, patient drinks every day last drink was the day before admission, patient denies any fever chills no shortness of breath no cough no chest pain palpitation no history of melena or hematochezia or hemoptysis or hematemesis, patient complaining of lower abdominal pain, pain is constant, moderate, no increasing or decreasing factors, no history of trauma, patient had elevated liver function tests on admission, KUB showed stool in rotation, ultrasound right upper quadrant showed probably fatty liver versus other hepatocellular disease no signs of acute cholecystitis, in the ER her potassium was low 2.5, BUN/creatinine stable, alcohol level 263, UA showed nitrates, CT head was negative, patient was originally admitted to [facility] but due to lack of bed patient was transferred to [facility] as a direct admission, patient was seen in her room she is alert oriented follows commands she just had small bowel movement, she states that her abdominal pain is improved, urinary symptoms have resolved, patient is still feeling anxious and shaky with tremors, but otherwise improved, at this time will continue CIWA protocol continue IV fluids follow-up cultures, supportive treatment, patient has expressed understanding of her plan of care and agree with request from an answer.        Review of Systems  Review of Systems   Constitutional: Negative for chills and fever.    HENT: Negative for congestion and sinus pain.    Eyes: Negative for blurred vision and double vision.   Respiratory: Negative for cough, hemoptysis and wheezing.    Cardiovascular: Negative for chest pain, palpitations, claudication, leg swelling and PND.   Gastrointestinal: Positive for abdominal pain and constipation. Negative for blood in stool, heartburn, melena, nausea and vomiting.   Genitourinary: Negative for hematuria and urgency.   Musculoskeletal: Negative for back pain and myalgias.   Skin: Negative for rash.   Neurological: Negative for dizziness and headaches.   Endo/Heme/Allergies: Does not bruise/bleed easily.   Psychiatric/Behavioral: Negative for depression and suicidal ideas.       Past Medical History   has a past medical history of Encephalitis and Hyperlipidemia.    Surgical History   has a past surgical history that includes pr treat ectopic preg,non remval; tonsillectomy; and other orthopedic surgery.     Family History    Family history reviewed with patient. There is no family history that is pertinent to the chief complaint.     Social History   reports that she has been smoking. She does not have any smokeless tobacco history on file. She reports current alcohol use. She reports that she does not use drugs.    Allergies  No Known Allergies    Medications  Prior to Admission Medications   Prescriptions Last Dose Informant Patient Reported? Taking?   neomycin (MYCIFRADIN) 500 MG Tab  Patient Yes No   Sig: Take 500 mg by mouth 2 times a day. Pt started taking NEOMYCIN 03/2022. P   oxybutynin (DITROPAN) 5 MG Tab  Patient Yes No   Sig: Take 5 mg by mouth 2 times a day.      Facility-Administered Medications: None       Physical Exam  Temp:  [36.8 °C (98.3 °F)] 36.8 °C (98.3 °F)  Pulse:  [] 85  Resp:  [13-36] 18  BP: ()/(59-81) 122/81  SpO2:  [92 %-100 %] 100 %  Blood Pressure: 122/81   Temperature: 36.8 °C (98.3 °F)   Pulse: 85   Respiration: 18   Pulse Oximetry: 100 %        Physical Exam  Vitals and nursing note reviewed.   Constitutional:       General: She is not in acute distress.     Appearance: Normal appearance. She is not ill-appearing.   HENT:      Head: Normocephalic.      Nose: Nose normal.   Eyes:      General: No scleral icterus.        Right eye: No discharge.         Left eye: No discharge.      Conjunctiva/sclera: Conjunctivae normal.   Cardiovascular:      Rate and Rhythm: Normal rate and regular rhythm.      Pulses: Normal pulses.      Heart sounds: Normal heart sounds.   Pulmonary:      Effort: Pulmonary effort is normal. No respiratory distress.      Breath sounds: Normal breath sounds. No wheezing.   Abdominal:      General: Bowel sounds are normal. There is no distension.      Palpations: Abdomen is soft.      Tenderness: There is no abdominal tenderness. There is no guarding.   Musculoskeletal:         General: Normal range of motion.      Cervical back: Normal range of motion and neck supple.      Right lower leg: No edema.      Left lower leg: No edema.   Skin:     General: Skin is warm and dry.      Capillary Refill: Capillary refill takes less than 2 seconds.      Coloration: Skin is not jaundiced.   Neurological:      General: No focal deficit present.      Mental Status: She is alert and oriented to person, place, and time.      Cranial Nerves: No cranial nerve deficit.   Psychiatric:         Mood and Affect: Mood is anxious.         Laboratory:  Recent Labs     05/03/22  1234 05/04/22  0015   WBC 6.6 5.7   RBC 3.71* 3.01*   HEMOGLOBIN 13.2 10.6*   HEMATOCRIT 38.3 31.2*   .2* 103.7*   MCH 35.6* 35.2*   MCHC 34.5 34.0   RDW 75.1* 73.9*   PLATELETCT 294 185   MPV 9.5 9.8     Recent Labs     05/03/22  1234 05/03/22  1806 05/04/22  0015   SODIUM 138  --  134*   POTASSIUM 2.5* 2.7* 3.6   CHLORIDE 95*  --  98   CO2 25  --  22   GLUCOSE 88  --  114*   BUN 6*  --  5*   CREATININE 0.31*  --  0.24*   CALCIUM 8.9  --  8.1*     Recent Labs      05/03/22  1234 05/04/22  0015   ALTSGPT 43  --    ASTSGOT 201*  --    ALKPHOSPHAT 279*  --    TBILIRUBIN 2.6*  --    LIPASE 23  --    GLUCOSE 88 114*         No results for input(s): NTPROBNP in the last 72 hours.      Recent Labs     05/03/22  1234 05/03/22  1806 05/04/22  0632   TROPONINT 119* 113* 110*       Imaging:  EC-ECHOCARDIOGRAM COMPLETE W/ CONT    (Results Pending)           Assessment/Plan:  Justification for Admission Status  I anticipate this patient will require at least two midnights for appropriate medical management, necessitating inpatient admission because Alcohol abuse with dependency and alcohol withdrawal, severe hypokalemia.    * Alcohol dependence with withdrawal (HCC)- (present on admission)  Assessment & Plan  Continue supportive treatment  mtv  Folate  Thiamine  ciwa    Constipation- (present on admission)  Assessment & Plan  kub showed large amount of stool  Bowel protocol.    Lactic acidosis- (present on admission)  Assessment & Plan  Improved  Likely multifactorial.     Anemia- (present on admission)  Assessment & Plan  Likely chronic  Elevated MCV  Check ferritin  Vit b12  Iron  No requiring blood transfusion at this time.     Alcohol withdrawal syndrome, uncomplicated (HCC)- (present on admission)  Assessment & Plan  As above.     Urinary tract infection without hematuria- (present on admission)  Assessment & Plan  Continue iv ceftriaxone  F/u urine cx.    Transaminitis- (present on admission)  Assessment & Plan  Due to alcohol abuse  US liver showed The liver is diffusely echogenic, most compatible with fatty infiltration, however other hepatocellular disease processes are in the differential diagnosis, contracted bladder no signs of inflammation.  F/u cmp in am.     Dehydration- (present on admission)  Assessment & Plan  Received iv hydration  Now able to tolerate diet    Hypokalemia- (present on admission)  Assessment & Plan  Replaced  Monitor.       VTE prophylaxis: SCDs/TEDs

## 2022-05-05 NOTE — PROGRESS NOTES
"Hospital Medicine Daily Progress Note    Date of Service  5/5/2022    Chief Complaint  Vianney Castro is a 62 y.o. female admitted 5/4/2022 with abdominal pain.    Hospital Course  As per chart review:  \"62 y.o. female who presented 5/4/2022 with past medical history of alcohol abuse, C. difficile colitis, presenting to [facility] with complaining of generalized weakness abdominal pain nausea vomiting dysuria and frequency, patient apparently also having multiple falls, she denies any focal weakness, patient drinks every day last drink was the day before admission, patient denies any fever chills no shortness of breath no cough no chest pain palpitation no history of melena or hematochezia or hemoptysis or hematemesis, patient complaining of lower abdominal pain, pain is constant, moderate, no increasing or decreasing factors, no history of trauma, patient had elevated liver function tests on admission, KUB showed stool in rotation, ultrasound right upper quadrant showed probably fatty liver versus other hepatocellular disease no signs of acute cholecystitis, in the ER her potassium was low 2.5, BUN/creatinine stable, alcohol level 263, UA showed nitrates, CT head was negative, patient was originally admitted to [facility] but due to lack of bed patient was transferred to [facility] as a direct admission, patient was seen in her room she is alert oriented follows commands she just had small bowel movement, she states that her abdominal pain is improved, urinary symptoms have resolved, patient is still feeling anxious and shaky with tremors, but otherwise improved, at this time will continue CIWA protocol continue IV fluids follow-up cultures, supportive treatment.\"    Interval Problem Update  5/5: Patient seen at bedside this morning.  Patient seems anxious.  She has been receiving Ativan for CIWA protocol.  We will continue with IV fluids for now.  Continue to monitor electrolytes.  We will " continue with antibiotics, pending cultures.    I have personally seen and examined the patient at bedside. I discussed the plan of care with patient, bedside RN and charge RN.    Consultants/Specialty  None    Code Status  Full Code    Disposition  Patient is not medically cleared for discharge.   Anticipate discharge to to home with close outpatient follow-up.    Review of Systems  Review of Systems   Constitutional: Positive for malaise/fatigue. Negative for chills and fever.   HENT: Negative for hearing loss and nosebleeds.    Eyes: Negative for blurred vision and double vision.   Respiratory: Negative for cough and shortness of breath.    Cardiovascular: Negative for chest pain and palpitations.   Gastrointestinal: Negative for abdominal pain, heartburn and vomiting.   Genitourinary: Negative for dysuria and urgency.   Musculoskeletal: Negative for back pain and falls.   Skin: Negative for itching and rash.   Neurological: Negative for dizziness and headaches.   Psychiatric/Behavioral: Negative for substance abuse. The patient is not nervous/anxious.    All other systems reviewed and are negative.       Physical Exam  Temp:  [36.8 °C (98.2 °F)-37.3 °C (99.1 °F)] 37.3 °C (99.1 °F)  Pulse:  [] 84  Resp:  [14-25] 18  BP: (105-130)/(66-81) 111/75  SpO2:  [93 %-100 %] 96 %    Physical Exam  Vitals and nursing note reviewed.   Constitutional:       General: She is not in acute distress.     Appearance: She is ill-appearing (chronically).   HENT:      Head: Normocephalic and atraumatic.      Right Ear: External ear normal.      Left Ear: External ear normal.      Mouth/Throat:      Pharynx: No oropharyngeal exudate or posterior oropharyngeal erythema.   Eyes:      General:         Right eye: No discharge.         Left eye: No discharge.   Cardiovascular:      Rate and Rhythm: Normal rate and regular rhythm.      Pulses: Normal pulses.      Heart sounds: No murmur heard.    No gallop.   Pulmonary:      Effort:  Pulmonary effort is normal. No respiratory distress.      Breath sounds: Normal breath sounds. No wheezing or rhonchi.   Abdominal:      General: Bowel sounds are normal. There is no distension.      Palpations: Abdomen is soft.      Tenderness: There is no abdominal tenderness. There is no guarding.   Musculoskeletal:         General: No swelling or tenderness. Normal range of motion.      Cervical back: Normal range of motion and neck supple. No tenderness.   Skin:     General: Skin is warm and dry.   Neurological:      General: No focal deficit present.      Mental Status: She is alert and oriented to person, place, and time. Mental status is at baseline.      Motor: No weakness.   Psychiatric:         Mood and Affect: Mood normal.         Behavior: Behavior normal.         Thought Content: Thought content normal.      Comments: Somewhat anxious         Fluids    Intake/Output Summary (Last 24 hours) at 5/5/2022 0926  Last data filed at 5/5/2022 0800  Gross per 24 hour   Intake 240 ml   Output --   Net 240 ml       Laboratory  Recent Labs     05/03/22  1234 05/04/22  0015 05/05/22  0235   WBC 6.6 5.7 6.5   RBC 3.71* 3.01* 2.90*   HEMOGLOBIN 13.2 10.6* 10.4*   HEMATOCRIT 38.3 31.2* 30.5*   .2* 103.7* 105.2*   MCH 35.6* 35.2* 35.9*   MCHC 34.5 34.0 34.1   RDW 75.1* 73.9* 70.9*   PLATELETCT 294 185 148*   MPV 9.5 9.8 10.1     Recent Labs     05/03/22  1234 05/03/22  1806 05/04/22  0015 05/05/22  0235   SODIUM 138  --  134* 130*   POTASSIUM 2.5* 2.7* 3.6 3.1*   CHLORIDE 95*  --  98 97   CO2 25  --  22 20   GLUCOSE 88  --  114* 84   BUN 6*  --  5* 2*   CREATININE 0.31*  --  0.24* 0.23*   CALCIUM 8.9  --  8.1* 8.7                   Imaging  EC-ECHOCARDIOGRAM COMPLETE W/ CONT    (Results Pending)        Assessment/Plan  * Alcohol dependence with withdrawal (HCC)- (present on admission)  Assessment & Plan  Continue supportive treatment  mtv  Folate  Thiamine  ciwa    Hyponatremia  Assessment & Plan  In the setting  of alcohol abuse  Continue IVF for now    Constipation- (present on admission)  Assessment & Plan  kub showed large amount of stool  Bowel protocol.    Lactic acidosis- (present on admission)  Assessment & Plan  Improved  Likely multifactorial.     Anemia- (present on admission)  Assessment & Plan  Likely chronic  Elevated MCV  Check ferritin  Vit b12  Iron  No requiring blood transfusion at this time.     Alcohol withdrawal syndrome, uncomplicated (HCC)- (present on admission)  Assessment & Plan  As above.     Urinary tract infection without hematuria- (present on admission)  Assessment & Plan  Continue iv ceftriaxone  F/u urine cx.    Transaminitis- (present on admission)  Assessment & Plan  Due to alcohol abuse  US liver showed The liver is diffusely echogenic, most compatible with fatty infiltration, however other hepatocellular disease processes are in the differential diagnosis, contracted bladder no signs of inflammation.  F/u cmp in am.     Dehydration- (present on admission)  Assessment & Plan  Received iv hydration  Now able to tolerate diet    Hypokalemia- (present on admission)  Assessment & Plan  Replaced  Monitor.          VTE prophylaxis: Xarelto 10 mg daily as prophylaxis    I have performed a physical exam and reviewed and updated ROS and Plan today (5/5/2022). In review of yesterday's note (5/4/2022), there are no changes except as documented above.

## 2022-05-05 NOTE — CARE PLAN
The patient is Stable - Low risk of patient condition declining or worsening    Shift Goals  Clinical Goals: CIWA scores, safety  Patient Goals: sleep, feel better    Progress made toward(s) clinical / shift goals:  CIWA assessed frequently. Patient with bed and strip alarm in place.     Patient is not progressing towards the following goals:      Problem: Psychosocial  Goal: Patient's level of anxiety will decrease  Outcome: Not Progressing  Note: Patient anxious and unable to sleep.      Problem: Knowledge Deficit - Standard  Goal: Patient and family/care givers will demonstrate understanding of plan of care, disease process/condition, diagnostic tests and medications  Outcome: Progressing     Problem: Optimal Care for Alcohol Withdrawal  Goal: Optimal Care for the alcohol withdrawal patient  Outcome: Progressing  Note: CIWA protocol in place and medicated per orders in MAR.      Problem: Seizure Precautions  Goal: Implementation of seizure precautions  Outcome: Progressing

## 2022-05-05 NOTE — ASSESSMENT & PLAN NOTE
Due to alcohol abuse  US liver showed The liver is diffusely echogenic, most compatible with fatty infiltration, however other hepatocellular disease processes are in the differential diagnosis, contracted bladder no signs of inflammation.  F/u cmp in am.

## 2022-05-05 NOTE — DISCHARGE PLANNING
Anticipated Discharge Disposition: Anticipate home     Action: RNCM attended IDT rounds and reviewed chart. Pt A&Ox4 on RA. 6 clicks scores of 21/16. Per chart review pt up with SB assist with FWW, shuffled gait.    Barriers to Discharge: medical clearance, PT/OT eval     Plan:  f/u with medical team to discuss DC needs and barriers

## 2022-05-05 NOTE — THERAPY
"Occupational Therapy   Initial Evaluation     Patient Name: Vianney Castro  Age:  62 y.o., Sex:  female  Medical Record #: [id number]  Today's Date: 5/5/2022     Precautions  Precautions: Fall Risk    Assessment  Patient is 62 y.o. female with a diagnosis of weakness, nausea, vomiting, falls, ETOH use/ withdrawal.  Pt was recently hospitalized for c-diff, has had falls at home.  Pt reports questionable history of her level of function at home as being essentially couchbound where she stays all day or all night while spouse is at work.  She reports she cannot walk and he \"carries\" her to the bathroom when he is home and when he is not, she urinates into peripads.  Mom at bedside reports that spouse is TERMINALLY ILL which pt failed to disclose when stating he is her support system.  They live in a second story apt and she is unable to negotiate the stairs.  The only equipment she has at home is a shower chair.  Currently pt requires min/ModA to walk to BR with FWW- more assist required as she fatigues.  Legs are buckling and she is not safe to walk unassisted.  She also has poor safety awareness, tries to sit prematurely and abandons the walker prior to sitting.  She does not have insight to her current limitations and the lack of safety in her current situation. She is a HIGH FALL RISK . Pt is mod/max A for dressing and bathing, Nick toileting.  Currently pt is NOT safe for home and it is very important for her to go to a post acute therapy program to regain her mobility for ADL's as her Spouse is going to be progressively less able to assist her as his health is deteriorating.  OT will follow while in house.       Plan    Recommend Occupational Therapy 3 times per week until therapy goals are met for the following treatments:  Adaptive Equipment, Neuro Re-Education / Balance, Self Care/Activities of Daily Living, Therapeutic Activities, and Therapeutic Exercises.    DC Equipment Recommendations: Unable to determine at " "this time  Discharge Recommendations: Recommend post-acute placement for additional occupational therapy services prior to discharge home     Subjective    \"I don;t want to go to a rehab place.\"     Objective       05/05/22 1528   Prior Living Situation   Prior Services Intermittent Physical Support for ADL Per Family   Housing / Facility 2 Story Apartment / Condo   Steps Into Home   (1 flight)   Steps In Home 0   Rail Left Rail  (Steps into Home)  (stairs quite steep per family)   Bathroom Set up Bathtub / Shower Combination;Shower Chair   Equipment Owned Tub / Shower Seat   Lives with - Patient's Self Care Capacity Spouse   Comments Pt reports that she lives with \"\" and he carries me wherever I need to go in the apartment.  Mom at bedside who is visiting from [facility] and sister here who lives locally.  Mom reports that pt's spouse is \"not well, actually terminal\" and that along with working full time it it a huge burden on him trying to take care of Vianney   Prior Level of ADL Function   Self Feeding Independent   Grooming / Hygiene Independent   Bathing Requires Assist   Dressing Requires Assist   Toileting Requires Assist   Comments Pt wears pads or briefs and sits on couch all day while SO is not home because she can't walk to the bathroom.  She waits for him to return to help her to BR   Prior Level of IADL Function   Medication Management Requires Assist   Laundry Requires Assist   Kitchen Mobility Requires Assist   Finances Requires Assist   Home Management Requires Assist   Shopping Requires Assist   Prior Level Of Mobility   (Pt non-specific- states \"my  wraps his arms around me and helps me get to the bathroom, but its not walking.\")   Driving / Transportation Relatives / Others Provide Transportation   Occupation (Pre-Hospital Vocational) Not Employed   Leisure Interests Unable To Determine At This Time   History of Falls   History of Falls Yes   Date of Last Fall   (h/o falls, once recently " just after d/c from [place])   Precautions   Precautions Fall Risk   Vitals   O2 Delivery Device Room air w/o2 available   Pain 0 - 10 Group   Location Abdomen;Back;Shoulder   Location Orientation Lower   Description Aching;Sharp   Therapist Pain Assessment During Activity;Nurse Notified  (pain in abdomen while voiding on toilet)   Cognition    Cognition / Consciousness X   Speech/ Communication Delayed Responses   Level of Consciousness Alert   Safety Awareness Impaired;Impulsive   New Learning Impaired   Comments Pt demos impaired STM, decreased insight to limitations, impulsivity and impaired safety awareness.  Poor insight to timeline of events, and limited insight to the lack of safety in being couchbound at home with no consistent help.  Pt agreed she wishes she was stronger but was quick to resist when OT recommended rehab/SNF   Active ROM Upper Body   Active ROM Upper Body  WDL   Dominant Hand Right   Strength Upper Body   Upper Body Strength  X   Comments 4-/5 BUE   Upper Body Muscle Tone   Upper Body Muscle Tone  WDL   Balance Assessment   Sitting Balance (Static) Fair +   Sitting Balance (Dynamic) Fair   Standing Balance (Static) Poor   Standing Balance (Dynamic) Poor -   Weight Shift Sitting Fair   Weight Shift Standing Poor   Comments with FWW, legs buckling in standing and walking   Bed Mobility    Supine to Sit Supervised  (with rail and HOB up)   Sit to Supine Supervised   Scooting Supervised   ADL Assessment   Eating Independent   Grooming Minimal Assist;Standing   Upper Body Dressing Minimal Assist   Lower Body Dressing Contact Guard Assist   Toileting Minimal Assist   How much help from another person does the patient currently need...   Putting on and taking off regular lower body clothing? 2   Bathing (including washing, rinsing, and drying)? 2   Toileting, which includes using a toilet, bedpan, or urinal? 3   Putting on and taking off regular upper body clothing? 3   Taking care of personal  grooming such as brushing teeth? 3   Eating meals? 4   6 Clicks Daily Activity Score 17   Functional Mobility   Sit to Stand Minimal Assist   Bed, Chair, Wheelchair Transfer Minimal Assist   Toilet Transfers Minimal Assist   Transfer Method Stand Step   Mobility Min/ModA with FWW- required more assist with faitgue   Distance (Feet) 20   # of Times Distance was Traveled 2   Comments To toilet, sink and BTB   Visual Perception   Visual Perception  WDL   Edema / Skin Assessment   Comments slightly jaundice, family reports signficant wt loss over last 3 months   Activity Tolerance   Sitting Edge of Bed 5 min x2   Standing 2 min x3   Comments limited by weakness, fatigue   Patient / Family Goals   Patient / Family Goal #1 home   Short Term Goals   Short Term Goal # 1 Pt will dress CGA from seated in 5 visits   Short Term Goal # 2 pt will stand 10 min at sink to groom with CGA in 5 visits   Short Term Goal # 3 Pt will shower seated with Nick in 6 visits   Short Term Goal # 4 pt will toilet supervised in 5 visits   Education Group   Education Provided Role of Occupational Therapist;Activities of Daily Living;Adaptive Equipment;Home Safety;Transfers   Role of Occupational Therapist Patient Response Patient;Family;Acceptance;Explanation;Verbal Demonstration   Home Safety Patient Response Patient;Acceptance;Explanation;Verbal Demonstration   Transfers Patient Response Patient;Family;Acceptance;Explanation;Verbal Demonstration   ADL Patient Response Patient;Family;Acceptance;Verbal Demonstration   Adaptive Equipment Patient Response Patient;Family;Acceptance;Explanation;Verbal Demonstration   Additional Comments Pt resistant to education about her current limitations and need for further therapy to improve her functional potential. Education and long d/w pt and family about her current limitations and how it is unsafe for her to not be able to mobilize in any capacity at home without assist from spouse.  Given that spouse has  a terminal illness per family, he is not going to be a reliable source of support which makes it that much more important for pt to get rehabilitated now so she can care for herself.

## 2022-05-05 NOTE — CARE PLAN
The patient is Stable - Low risk of patient condition declining or worsening    Shift Goals  Clinical Goals: CIWA, monitor on tele  Patient Goals: Rest comfortably    Progress made toward(s) clinical / shift goals:    Problem: Fall Risk  Goal: Patient will remain free from falls  Outcome: Progressing     Problem: Optimal Care for Alcohol Withdrawal  Goal: Optimal Care for the alcohol withdrawal patient  Outcome: Progressing     Problem: Seizure Precautions  Goal: Implementation of seizure precautions  Outcome: Progressing     Problem: Lifestyle Changes  Goal: Patient's ability to identify lifestyle changes and available resources to help reduce recurrence of condition will improve  Outcome: Progressing

## 2022-05-05 NOTE — FLOWSHEET NOTE
05/04/22 2057   Vital Signs   Pulse 85   Respiration 17   Pulse Oximetry 100 %   $ Pulse Oximetry (Spot Check) Yes   Chest Exam   Work Of Breathing / Effort Within Normal Limits   Breath Sounds   RUL Breath Sounds Clear   RML Breath Sounds Clear   RLL Breath Sounds Clear   ECHO Breath Sounds Clear   LLL Breath Sounds Clear   Secretions   Cough Dry   Oxygen   O2 Delivery Device Room air w/o2 available   Smoking History   Have you ever smoked Yes   Have you smoked in the last 12 months Yes   Have you quit smoking No   Do you have any pipes/cigarettes/lighter/matches/etc in your possesion No   Smoking Cessation Offered Patient Counseled   Pulm. Rehab Referral Yes

## 2022-05-05 NOTE — CARE PLAN
Problem: Nutritional:  Goal: Achieve adequate nutritional intake  Description: Patient will consume >50% of meals  Outcome: Progressing     See RD note.

## 2022-05-05 NOTE — PROGRESS NOTES
Received bedside report from RN Anneliese, pt care assumed. VSS, pt assessment complete. Pt A&Ox4, no c/o pain at this time. POC discussed with pt and verbalizes no questions. Pt denies any additional needs at this time. Bed locked and in lowest position, bed alarm is on. Pt educated on fall risk and verbalized understanding, call light within reach, hourly rounding initiated.

## 2022-05-05 NOTE — DIETARY
"Nutrition services: Day 1 of admit.  Vianney Castro is a 62 y.o. female with admitting DX of Alcohol withdrawal syndrome, uncomplicated.    Consult received for BMI<19, MST score 5 for >34 lb wt loss in 3 months w/ poor PO intake reported. Pt reports wt loss/poor PO beginning w/ significant negative job-related event ~3 months ago. States weighed 170 lbs at that time. Pt reports has been drinking \"3-5 black velvet\" daily. Reports N/V over the past 3 months, though denies changes in portions sizes. States sometimes skips meals, though unable to describe frequency. Reports unable to tolerate greasy foods; foods such as fruits, vegetables, juice popsicles, breads tolerated. Reports lactose intolerance w/ reaction of diarrhea. Able to tolerate yogurt, small amounts of cheese, cheese in foods per pt; unable to tolerate milk to drink. Encouraged pt to order lower fat, cold meal items which might be better tolerated. Pt agreeable to soups/broths BID w/ crackers, lowfat Greek yogurts w/ meals.    Assessment:  Height: 170.2 cm (5' 7\")  Weight: 48.7 kg (107 lb 5.8 oz) - via bed scale  Body mass index is 16.82 kg/m²., BMI classification: Underweight  Diet/Intake: Regular diet: 50-75% x 2 meals    Evaluation:   1. PMHx includes EtOH abuse, C. diff, encephalitis, HLD. Dx list includes EtOH dependence w/ withdrawal, constipation, dehydration, hypkalemia, UTI w/o hematuria.  2. Pt initially presented to [facility] 5/3, but transferred to [facility] d/t bed availability. Noted to be dehydrated on admit, requiring IV fluid replacement. Per ED notes, 70-lb wt loss over 6 months reported and pt recently at [facility] for C. diff colitis.  3. Labs: Na 130, K+ 3.1, BUN 2, crea 0.23, alb 2.8. dx EtOH (5/3) 263.6  4. MAR: folvite, thiamine, LR @ 83 ml/hr, senna. Completed (5/5): Kdur, mag sulfate in D5W 2 100 ml/hr.  5. Last BM 5/5  6. On physical exam, pt w/ shallow temporal depressions, mild/moderte depletion of fat in triceps region.    Malnutrition Risk: " Pt reports 36.9% wt loss in 3 months. Physical findings do not support such a severe, short-term wt loss. Pt meets criteria for moderate malnutrition in the setting of chronic illness related to EtOH use and reported N/V x 3 months as evidenced by moderate muscle wasting and moderate fat wasting as noted above.    Recommendations/Plan:  1. Update MAR w/ lactose intolerance.   2. Provide soup/high-protein broths w/ crackers BID, Czech yogurts BID.  3. Encourage intake of meals.  4. Document intake of all PO as % taken in ADL's to provide interdisciplinary communication across all shifts.   5. Monitor weight.  6. Nutrition rep will continue to see patient for ongoing meal and snack preferences.     RD following.

## 2022-05-05 NOTE — ASSESSMENT & PLAN NOTE
Likely chronic  Elevated MCV  Check ferritin  Vit b12  Iron  No requiring blood transfusion at this time.

## 2022-05-05 NOTE — PROGRESS NOTES
Monitor Summary:    Rhythm:  SR   Rate Range:  76-94  Ectopy: No Ectopy    Measurements:  0.16/0.08/0.36

## 2022-05-05 NOTE — FLOWSHEET NOTE
05/04/22 2101   Patient History   Pulmonary Diagnosis asthma   Home O2 No   Nocturnal CPAP No   Home Treatments/Frequency Yes   MDI 1/Frequency albuterol PRN   Sleep Apnea Screening   Have you had a sleep study? No   Have you been diagnosed with sleep apnea? No   S - Have you been told that you SNORE? 0   T - Are you often TIRED during the day? 1   O - Do you know if you stop breathing or has anyone witnessed you stop breathing while you were asleep? (OBSTRUCTION) 0   P - Do you have high blood PRESSURE or on medication to control high blood pressure? 0   B - Is your Body Mass Index greater than 35? (BMI) 0   A - Are you 50 years old or older? (AGE) 1   N - Are you a male with a NECK circumference greater than 17 inches, or a female with a neck circumference greater than 16 inches? 0   G - Are you male? (GENDER) 0   Stop Bang Total Score 2   COPD Risk Screening   Do you have a history of COPD? No   COPD Population Screener   During the past 4 weeks, how much did you feel short of breath? 0   Do you ever cough up any mucus or phlegm? 0   In the past 12 months, you do less than you used to because of your breathing problems 0   Have you smoked at least 100 cigarettes in your entire life? 2   How old are you? 2   COPD Screening Score 4   COPD Coordinator Recommended Yes

## 2022-05-05 NOTE — PROGRESS NOTES
Telemetry Shift Summary     Rhythm: SR  Rate: 70-95  Measurements: .15/.07/.44  Ectopy (reported by Monitor Tech): r PAC, r PVC     Normal Values  Rhythm: Sinus  HR:   Measurements: 0.12-0.20/0.06-0.10/0.30-0.52

## 2022-05-06 LAB
ALBUMIN SERPL BCP-MCNC: 2.9 G/DL (ref 3.2–4.9)
ALBUMIN/GLOB SERPL: 0.9 G/DL
ALP SERPL-CCNC: 221 U/L (ref 30–99)
ALT SERPL-CCNC: 32 U/L (ref 2–50)
ANION GAP SERPL CALC-SCNC: 12 MMOL/L (ref 7–16)
AST SERPL-CCNC: 119 U/L (ref 12–45)
BASOPHILS # BLD AUTO: 0.7 % (ref 0–1.8)
BASOPHILS # BLD: 0.05 K/UL (ref 0–0.12)
BILIRUB SERPL-MCNC: 3 MG/DL (ref 0.1–1.5)
BUN SERPL-MCNC: 2 MG/DL (ref 8–22)
CALCIUM SERPL-MCNC: 9.1 MG/DL (ref 8.4–10.2)
CHLORIDE SERPL-SCNC: 98 MMOL/L (ref 96–112)
CO2 SERPL-SCNC: 20 MMOL/L (ref 20–33)
CREAT SERPL-MCNC: 0.18 MG/DL (ref 0.5–1.4)
EOSINOPHIL # BLD AUTO: 0.3 K/UL (ref 0–0.51)
EOSINOPHIL NFR BLD: 4.5 % (ref 0–6.9)
ERYTHROCYTE [DISTWIDTH] IN BLOOD BY AUTOMATED COUNT: 69.8 FL (ref 35.9–50)
GFR SERPLBLD CREATININE-BSD FMLA CKD-EPI: 136 ML/MIN/1.73 M 2
GLOBULIN SER CALC-MCNC: 3.4 G/DL (ref 1.9–3.5)
GLUCOSE SERPL-MCNC: 86 MG/DL (ref 65–99)
HCT VFR BLD AUTO: 32.2 % (ref 37–47)
HGB BLD-MCNC: 10.9 G/DL (ref 12–16)
IMM GRANULOCYTES # BLD AUTO: 0.04 K/UL (ref 0–0.11)
IMM GRANULOCYTES NFR BLD AUTO: 0.6 % (ref 0–0.9)
LYMPHOCYTES # BLD AUTO: 2.29 K/UL (ref 1–4.8)
LYMPHOCYTES NFR BLD: 34.2 % (ref 22–41)
MAGNESIUM SERPL-MCNC: 1.7 MG/DL (ref 1.5–2.5)
MCH RBC QN AUTO: 36 PG (ref 27–33)
MCHC RBC AUTO-ENTMCNC: 33.9 G/DL (ref 33.6–35)
MCV RBC AUTO: 106.3 FL (ref 81.4–97.8)
MONOCYTES # BLD AUTO: 0.62 K/UL (ref 0–0.85)
MONOCYTES NFR BLD AUTO: 9.3 % (ref 0–13.4)
NEUTROPHILS # BLD AUTO: 3.39 K/UL (ref 2–7.15)
NEUTROPHILS NFR BLD: 50.7 % (ref 44–72)
NRBC # BLD AUTO: 0 K/UL
NRBC BLD-RTO: 0 /100 WBC
PLATELET # BLD AUTO: 144 K/UL (ref 164–446)
PMV BLD AUTO: 10.4 FL (ref 9–12.9)
POTASSIUM SERPL-SCNC: 3 MMOL/L (ref 3.6–5.5)
PROT SERPL-MCNC: 6.3 G/DL (ref 6–8.2)
RBC # BLD AUTO: 3.03 M/UL (ref 4.2–5.4)
SODIUM SERPL-SCNC: 130 MMOL/L (ref 135–145)
WBC # BLD AUTO: 6.7 K/UL (ref 4.8–10.8)

## 2022-05-06 PROCEDURE — 99233 SBSQ HOSP IP/OBS HIGH 50: CPT | Performed by: INTERNAL MEDICINE

## 2022-05-06 PROCEDURE — 770006 HCHG ROOM/CARE - MED/SURG/GYN SEMI*

## 2022-05-06 PROCEDURE — 97162 PT EVAL MOD COMPLEX 30 MIN: CPT

## 2022-05-06 PROCEDURE — 700102 HCHG RX REV CODE 250 W/ 637 OVERRIDE(OP): Performed by: HOSPITALIST

## 2022-05-06 PROCEDURE — 83735 ASSAY OF MAGNESIUM: CPT

## 2022-05-06 PROCEDURE — 80053 COMPREHEN METABOLIC PANEL: CPT

## 2022-05-06 PROCEDURE — 700102 HCHG RX REV CODE 250 W/ 637 OVERRIDE(OP): Performed by: INTERNAL MEDICINE

## 2022-05-06 PROCEDURE — 700111 HCHG RX REV CODE 636 W/ 250 OVERRIDE (IP): Performed by: INTERNAL MEDICINE

## 2022-05-06 PROCEDURE — 700105 HCHG RX REV CODE 258: Performed by: INTERNAL MEDICINE

## 2022-05-06 PROCEDURE — A9270 NON-COVERED ITEM OR SERVICE: HCPCS | Performed by: HOSPITALIST

## 2022-05-06 PROCEDURE — A9270 NON-COVERED ITEM OR SERVICE: HCPCS | Performed by: INTERNAL MEDICINE

## 2022-05-06 PROCEDURE — 36415 COLL VENOUS BLD VENIPUNCTURE: CPT

## 2022-05-06 PROCEDURE — 85025 COMPLETE CBC W/AUTO DIFF WBC: CPT

## 2022-05-06 RX ORDER — POTASSIUM CHLORIDE 20 MEQ/1
40 TABLET, EXTENDED RELEASE ORAL 2 TIMES DAILY
Status: COMPLETED | OUTPATIENT
Start: 2022-05-06 | End: 2022-05-06

## 2022-05-06 RX ORDER — MAGNESIUM SULFATE 1 G/100ML
1 INJECTION INTRAVENOUS ONCE
Status: COMPLETED | OUTPATIENT
Start: 2022-05-06 | End: 2022-05-06

## 2022-05-06 RX ADMIN — LORAZEPAM 1 MG: 1 TABLET ORAL at 17:20

## 2022-05-06 RX ADMIN — POTASSIUM CHLORIDE 40 MEQ: 20 TABLET, EXTENDED RELEASE ORAL at 17:20

## 2022-05-06 RX ADMIN — LORAZEPAM 1 MG: 1 TABLET ORAL at 09:08

## 2022-05-06 RX ADMIN — NITROFURANTOIN (MONOHYDRATE/MACROCRYSTALS) 100 MG: 75; 25 CAPSULE ORAL at 07:53

## 2022-05-06 RX ADMIN — NITROFURANTOIN (MONOHYDRATE/MACROCRYSTALS) 100 MG: 75; 25 CAPSULE ORAL at 17:20

## 2022-05-06 RX ADMIN — OXYBUTYNIN CHLORIDE 5 MG: 5 TABLET ORAL at 17:21

## 2022-05-06 RX ADMIN — POTASSIUM CHLORIDE 40 MEQ: 20 TABLET, EXTENDED RELEASE ORAL at 07:53

## 2022-05-06 RX ADMIN — SODIUM CHLORIDE, POTASSIUM CHLORIDE, SODIUM LACTATE AND CALCIUM CHLORIDE: 600; 310; 30; 20 INJECTION, SOLUTION INTRAVENOUS at 12:52

## 2022-05-06 RX ADMIN — LORAZEPAM 1 MG: 1 TABLET ORAL at 13:04

## 2022-05-06 RX ADMIN — FOLIC ACID 1 MG: 1 TABLET ORAL at 05:41

## 2022-05-06 RX ADMIN — LORAZEPAM 1 MG: 1 TABLET ORAL at 05:41

## 2022-05-06 RX ADMIN — THIAMINE HCL TAB 100 MG 100 MG: 100 TAB at 05:41

## 2022-05-06 RX ADMIN — RIVAROXABAN 10 MG: 10 TABLET, FILM COATED ORAL at 17:20

## 2022-05-06 RX ADMIN — LORAZEPAM 3 MG: 1 TABLET ORAL at 21:31

## 2022-05-06 RX ADMIN — MAGNESIUM SULFATE 1 G: 1 INJECTION INTRAVENOUS at 07:53

## 2022-05-06 RX ADMIN — OXYBUTYNIN CHLORIDE 5 MG: 5 TABLET ORAL at 05:41

## 2022-05-06 ASSESSMENT — COGNITIVE AND FUNCTIONAL STATUS - GENERAL
MOBILITY SCORE: 21
TOILETING: A LITTLE
SUGGESTED CMS G CODE MODIFIER MOBILITY: CJ
SUGGESTED CMS G CODE MODIFIER DAILY ACTIVITY: CJ
WALKING IN HOSPITAL ROOM: A LITTLE
DRESSING REGULAR UPPER BODY CLOTHING: A LITTLE
STANDING UP FROM CHAIR USING ARMS: A LITTLE
WALKING IN HOSPITAL ROOM: A LITTLE
SUGGESTED CMS G CODE MODIFIER MOBILITY: CJ
CLIMB 3 TO 5 STEPS WITH RAILING: A LITTLE
DRESSING REGULAR LOWER BODY CLOTHING: A LITTLE
MOBILITY SCORE: 21
STANDING UP FROM CHAIR USING ARMS: A LITTLE
CLIMB 3 TO 5 STEPS WITH RAILING: A LITTLE
HELP NEEDED FOR BATHING: A LITTLE
DAILY ACTIVITIY SCORE: 20

## 2022-05-06 ASSESSMENT — LIFESTYLE VARIABLES
AUDITORY DISTURBANCES: NOT PRESENT
AUDITORY DISTURBANCES: NOT PRESENT
HEADACHE, FULLNESS IN HEAD: NOT PRESENT
TOTAL SCORE: 8
PAROXYSMAL SWEATS: NO SWEAT VISIBLE
ORIENTATION AND CLOUDING OF SENSORIUM: ORIENTED AND CAN DO SERIAL ADDITIONS
TOTAL SCORE: VERY MILD ITCHING, PINS AND NEEDLES SENSATION, BURNING OR NUMBNESS
ANXIETY: MILDLY ANXIOUS
VISUAL DISTURBANCES: NOT PRESENT
ORIENTATION AND CLOUDING OF SENSORIUM: ORIENTED AND CAN DO SERIAL ADDITIONS
NAUSEA AND VOMITING: *
TREMOR: *
ORIENTATION AND CLOUDING OF SENSORIUM: ORIENTED AND CAN DO SERIAL ADDITIONS
AGITATION: SOMEWHAT MORE THAN NORMAL ACTIVITY
AUDITORY DISTURBANCES: NOT PRESENT
PAROXYSMAL SWEATS: NO SWEAT VISIBLE
TOTAL SCORE: 16
ORIENTATION AND CLOUDING OF SENSORIUM: ORIENTED AND CAN DO SERIAL ADDITIONS
HEADACHE, FULLNESS IN HEAD: SEVERE
HEADACHE, FULLNESS IN HEAD: VERY MILD
ORIENTATION AND CLOUDING OF SENSORIUM: ORIENTED AND CAN DO SERIAL ADDITIONS
AUDITORY DISTURBANCES: NOT PRESENT
TOTAL SCORE: 8
TREMOR: TREMOR NOT VISIBLE BUT CAN BE FELT, FINGERTIP TO FINGERTIP
TREMOR: TREMOR NOT VISIBLE BUT CAN BE FELT, FINGERTIP TO FINGERTIP
ANXIETY: *
ORIENTATION AND CLOUDING OF SENSORIUM: ORIENTED AND CAN DO SERIAL ADDITIONS
TREMOR: TREMOR NOT VISIBLE BUT CAN BE FELT, FINGERTIP TO FINGERTIP
NAUSEA AND VOMITING: *
ANXIETY: *
TOTAL SCORE: 8
AUDITORY DISTURBANCES: NOT PRESENT
TREMOR: *
AGITATION: SOMEWHAT MORE THAN NORMAL ACTIVITY
AGITATION: SOMEWHAT MORE THAN NORMAL ACTIVITY
VISUAL DISTURBANCES: NOT PRESENT
HEADACHE, FULLNESS IN HEAD: VERY MILD
ANXIETY: *
TOTAL SCORE: 8
HEADACHE, FULLNESS IN HEAD: VERY MILD
AUDITORY DISTURBANCES: NOT PRESENT
NAUSEA AND VOMITING: *
ANXIETY: *
VISUAL DISTURBANCES: NOT PRESENT
VISUAL DISTURBANCES: NOT PRESENT
TREMOR: TREMOR NOT VISIBLE BUT CAN BE FELT, FINGERTIP TO FINGERTIP
TOTAL SCORE: 3
SUBSTANCE_ABUSE: 0
VISUAL DISTURBANCES: NOT PRESENT
NAUSEA AND VOMITING: *
TREMOR: TREMOR NOT VISIBLE BUT CAN BE FELT, FINGERTIP TO FINGERTIP
AUDITORY DISTURBANCES: NOT PRESENT
NAUSEA AND VOMITING: NO NAUSEA AND NO VOMITING
HEADACHE, FULLNESS IN HEAD: VERY MILD
ORIENTATION AND CLOUDING OF SENSORIUM: ORIENTED AND CAN DO SERIAL ADDITIONS
HEADACHE, FULLNESS IN HEAD: MODERATE
NAUSEA AND VOMITING: MILD NAUSEA WITH NO VOMITING
ORIENTATION AND CLOUDING OF SENSORIUM: ORIENTED AND CAN DO SERIAL ADDITIONS
TREMOR: *
PAROXYSMAL SWEATS: BARELY PERCEPTIBLE SWEATING. PALMS MOIST
TOTAL SCORE: 8
PAROXYSMAL SWEATS: NO SWEAT VISIBLE
AGITATION: SOMEWHAT MORE THAN NORMAL ACTIVITY
NAUSEA AND VOMITING: MILD NAUSEA WITH NO VOMITING
VISUAL DISTURBANCES: NOT PRESENT
NAUSEA AND VOMITING: MILD NAUSEA WITH NO VOMITING
AGITATION: SOMEWHAT MORE THAN NORMAL ACTIVITY
AUDITORY DISTURBANCES: NOT PRESENT
AGITATION: SOMEWHAT MORE THAN NORMAL ACTIVITY
ANXIETY: MODERATELY ANXIOUS OR GUARDED, SO ANXIETY IS INFERRED
VISUAL DISTURBANCES: NOT PRESENT
PAROXYSMAL SWEATS: NO SWEAT VISIBLE
VISUAL DISTURBANCES: NOT PRESENT
TOTAL SCORE: MILD ITCHING, PINS AND NEEDLES SENSATION, BURNING OR NUMBNESS
HEADACHE, FULLNESS IN HEAD: VERY MILD
PAROXYSMAL SWEATS: BARELY PERCEPTIBLE SWEATING. PALMS MOIST
PAROXYSMAL SWEATS: BARELY PERCEPTIBLE SWEATING. PALMS MOIST
AGITATION: NORMAL ACTIVITY
ANXIETY: *
PAROXYSMAL SWEATS: NO SWEAT VISIBLE
ANXIETY: *
AGITATION: SOMEWHAT MORE THAN NORMAL ACTIVITY
TOTAL SCORE: 9

## 2022-05-06 ASSESSMENT — ENCOUNTER SYMPTOMS
PALPITATIONS: 0
DOUBLE VISION: 0
COUGH: 0
BLURRED VISION: 0
HEADACHES: 0
NERVOUS/ANXIOUS: 0
ABDOMINAL PAIN: 0
HEARTBURN: 0
BACK PAIN: 0
VOMITING: 0
FEVER: 0
SHORTNESS OF BREATH: 0
DIZZINESS: 0
FALLS: 0
CHILLS: 0

## 2022-05-06 ASSESSMENT — GAIT ASSESSMENTS
DISTANCE (FEET): 100
DEVIATION: STEP TO
ASSISTIVE DEVICE: FRONT WHEEL WALKER
GAIT LEVEL OF ASSIST: SUPERVISED

## 2022-05-06 ASSESSMENT — PAIN DESCRIPTION - PAIN TYPE
TYPE: ACUTE PAIN
TYPE: ACUTE PAIN

## 2022-05-06 NOTE — DISCHARGE PLANNING
Received Choice form at [phone]  Agency/Facility Name: [facility]  Referral sent per Choice form @ [phone]

## 2022-05-06 NOTE — PROGRESS NOTES
"Hospital Medicine Daily Progress Note    Date of Service  5/6/2022    Chief Complaint  Vianney Castro is a 62 y.o. female admitted 5/4/2022 with abdominal pain.    Hospital Course  As per chart review:  \"62 y.o. female who presented 5/4/2022 with past medical history of alcohol abuse, C. difficile colitis, presenting to [facility] with complaining of generalized weakness abdominal pain nausea vomiting dysuria and frequency, patient apparently also having multiple falls, she denies any focal weakness, patient drinks every day last drink was the day before admission, patient denies any fever chills no shortness of breath no cough no chest pain palpitation no history of melena or hematochezia or hemoptysis or hematemesis, patient complaining of lower abdominal pain, pain is constant, moderate, no increasing or decreasing factors, no history of trauma, patient had elevated liver function tests on admission, KUB showed stool in rotation, ultrasound right upper quadrant showed probably fatty liver versus other hepatocellular disease no signs of acute cholecystitis, in the ER her potassium was low 2.5, BUN/creatinine stable, alcohol level 263, UA showed nitrates, CT head was negative, patient was originally admitted to [facility] but due to lack of bed patient was transferred to [facility] as a direct admission, patient was seen in her room she is alert oriented follows commands she just had small bowel movement, she states that her abdominal pain is improved, urinary symptoms have resolved, patient is still feeling anxious and shaky with tremors, but otherwise improved, at this time will continue CIWA protocol continue IV fluids follow-up cultures, supportive treatment.\"    Interval Problem Update  5/5: Patient seen at bedside this morning.  Patient seems anxious.  She has been receiving Ativan for CIWA protocol.  We will continue with IV fluids for now.  Continue to monitor electrolytes.  We will " continue with antibiotics, pending cultures.    5/6: Patient seen at bedside this morning.  Patient still somewhat anxious.  She was called with a letter in her hand, she said that she wanted to go smoke.  I asked her if she wanted some nicotine patches and she agreed.  We are still replacing electrolytes.  PT/OT recommending skilled nursing facility.  Referral has been placed.  We appreciate further recommendations by case management.  I also received a call from the patient's daughter Cydney from number [phone], she called concerned about her mother and asking if it was necessary to pursue guardianship.  Today at the time of my evaluation the patient alert and oriented and able to make her own decisions.  I do not believe that guardianship can be pursued at this time.  We will continue to monitor closely, continue CIWA protocol as needed and replace electrolytes as needed.    I have personally seen and examined the patient at bedside. I discussed the plan of care with patient, bedside RN and charge RN.    Consultants/Specialty  None    Code Status  Full Code    Disposition  Patient is not medically cleared for discharge.   Anticipate discharge to to skilled nursing facility.    Review of Systems  Review of Systems   Constitutional: Positive for malaise/fatigue. Negative for chills and fever.   HENT: Negative for hearing loss and nosebleeds.    Eyes: Negative for blurred vision and double vision.   Respiratory: Negative for cough and shortness of breath.    Cardiovascular: Negative for chest pain and palpitations.   Gastrointestinal: Negative for abdominal pain, heartburn and vomiting.   Genitourinary: Negative for dysuria and urgency.   Musculoskeletal: Negative for back pain and falls.   Skin: Negative for itching and rash.   Neurological: Negative for dizziness and headaches.   Psychiatric/Behavioral: Negative for substance abuse. The patient is not nervous/anxious.    All other systems reviewed and are  negative.       Physical Exam  Temp:  [36.5 °C (97.7 °F)-36.9 °C (98.5 °F)] 36.5 °C (97.7 °F)  Pulse:  [71-80] 80  Resp:  [17-18] 18  BP: (102-125)/(75-80) 114/79  SpO2:  [94 %-100 %] 96 %    Physical Exam  Vitals and nursing note reviewed.   Constitutional:       General: She is not in acute distress.     Appearance: She is ill-appearing (chronically).   HENT:      Head: Normocephalic and atraumatic.      Right Ear: External ear normal.      Left Ear: External ear normal.      Mouth/Throat:      Pharynx: No oropharyngeal exudate or posterior oropharyngeal erythema.   Eyes:      General:         Right eye: No discharge.         Left eye: No discharge.   Cardiovascular:      Rate and Rhythm: Normal rate and regular rhythm.      Pulses: Normal pulses.      Heart sounds: No murmur heard.    No gallop.   Pulmonary:      Effort: Pulmonary effort is normal. No respiratory distress.      Breath sounds: Normal breath sounds. No wheezing or rhonchi.   Abdominal:      General: Bowel sounds are normal. There is no distension.      Palpations: Abdomen is soft.      Tenderness: There is no abdominal tenderness. There is no guarding.   Musculoskeletal:         General: No swelling or tenderness. Normal range of motion.      Cervical back: Normal range of motion and neck supple. No tenderness.   Skin:     General: Skin is warm and dry.   Neurological:      General: No focal deficit present.      Mental Status: She is alert and oriented to person, place, and time. Mental status is at baseline.      Motor: No weakness.   Psychiatric:         Mood and Affect: Mood normal.         Behavior: Behavior normal.         Thought Content: Thought content normal.      Comments: Somewhat anxious         Fluids    Intake/Output Summary (Last 24 hours) at 5/6/2022 1203  Last data filed at 5/6/2022 0830  Gross per 24 hour   Intake 290 ml   Output --   Net 290 ml       Laboratory  Recent Labs     05/04/22  0015 05/05/22  0235 05/06/22  0309   WBC  5.7 6.5 6.7   RBC 3.01* 2.90* 3.03*   HEMOGLOBIN 10.6* 10.4* 10.9*   HEMATOCRIT 31.2* 30.5* 32.2*   .7* 105.2* 106.3*   MCH 35.2* 35.9* 36.0*   MCHC 34.0 34.1 33.9   RDW 73.9* 70.9* 69.8*   PLATELETCT 185 148* 144*   MPV 9.8 10.1 10.4     Recent Labs     05/04/22  0015 05/05/22  0235 05/06/22  0309   SODIUM 134* 130* 130*   POTASSIUM 3.6 3.1* 3.0*   CHLORIDE 98 97 98   CO2 22 20 20   GLUCOSE 114* 84 86   BUN 5* 2* 2*   CREATININE 0.24* 0.23* 0.18*   CALCIUM 8.1* 8.7 9.1                   Imaging  EC-ECHOCARDIOGRAM COMPLETE W/O CONT   Final Result           Assessment/Plan  * Alcohol dependence with withdrawal (HCC)- (present on admission)  Assessment & Plan  Continue supportive treatment  mtv  Folate  Thiamine  ciwa    Generalized weakness  Assessment & Plan  PT/OT    Hyponatremia  Assessment & Plan  In the setting of alcohol abuse  Continue IVF for now    Constipation- (present on admission)  Assessment & Plan  kub showed large amount of stool  Bowel protocol.    Lactic acidosis- (present on admission)  Assessment & Plan  Improved  Likely multifactorial.     Anemia- (present on admission)  Assessment & Plan  Likely chronic  Elevated MCV  Check ferritin  Vit b12  Iron  No requiring blood transfusion at this time.     Alcohol withdrawal syndrome, uncomplicated (HCC)- (present on admission)  Assessment & Plan  As above.     Urinary tract infection without hematuria- (present on admission)  Assessment & Plan  Continue antibiotics  F/u urine cx.    Transaminitis- (present on admission)  Assessment & Plan  Due to alcohol abuse  US liver showed The liver is diffusely echogenic, most compatible with fatty infiltration, however other hepatocellular disease processes are in the differential diagnosis, contracted bladder no signs of inflammation.  F/u cmp in am.     Dehydration- (present on admission)  Assessment & Plan  Received iv hydration  Now able to tolerate diet    Hypokalemia- (present on admission)  Assessment  & Plan  Replaced  Monitor.        VTE prophylaxis: Xarelto 10 mg daily as prophylaxis    I have performed a physical exam and reviewed and updated ROS and Plan today (5/6/2022). In review of yesterday's note (5/5/2022), there are no changes except as documented above.

## 2022-05-06 NOTE — PROGRESS NOTES
Received bedside patient report from day shift RN. Patient resting in bed. Patient is awake, A&Ox4, and reports slight nausea. Safety precautions in place.

## 2022-05-06 NOTE — DISCHARGE PLANNING
Spoke to pt at bedside. Received verbal approval per pt for DME FWW with [facility]. Choice form completed and faxed to [facility]. Notified bedside RN James that FWW can be obtained from [facility] closet. Per James she will obtain FWW and deliver to patient's bedside.

## 2022-05-06 NOTE — PROGRESS NOTES
Received report from narendra Germain RN. Patient to unit via WC. Alert and oriented x4. IVF infusing into PIV. Patient denies pain, reports slight nausea but wants to try eating some dinner. Up to bathroom to void 1x assist with FWW. 2 RN skin assessment complete. Bed alarm on, call light within reach, hourly rounding.

## 2022-05-06 NOTE — DOCUMENTATION QUERY
[facility]                                                                       Query Response Note      PATIENT:               ELHAM HUBBARD  ACCT #:                  [id number]  MRN:                     [id number]  :                      1960  ADMIT DATE:       2022 3:53 PM  DISCH DATE:          RESPONDING  PROVIDER #:        [id number]           QUERY TEXT:    The health record contains the following clinical indicators:  American Society for Parenteral and Enteral Nutrition (ASPEN) criteria (presence of at least two)  per registered dietician evaluation patient reports 36.9% weight loss in 3 months.  Per dietary assessment the patient has chronic illness related to ETOH use and reported n/v x 3 months as evidenced by moderate muscle wasting and moderate fat wasting     Based upon your judgement and the above clinical indicators, please select the most appropriate diagnosis for the findings and the clinical significance of the findings    The patient's Clinical Indicators include:  - Findings:  Registered dietician evaluation 22:  Malnutrition Risk: Pt reports 36.9% wt loss in 3 months. Physical findings do not support such a severe, short-term wt loss. Pt meets criteria for moderate malnutrition in the setting of chronic illness related  to ETOH use and reported N/V x 3 months as evidenced by moderate muscle wasting and moderate fat wasting   - Body mass index is 16.82 kg/m²., BMI classification: Underweight  -  On physical exam, pt w/ shallow temporal depressions, mild/moderte depletion of fat in triceps region.    - Treatments:  registered dietician evaluation, soup/high-protein broths w/ crackers BID, Cypriot yogurts BID, monitor weight     - Risk factors:  alcohol dependence, anemia, hyponatremia, dehydration, hypokalemia, tremor, repeat falls     Thank You,  Viky Vaughan RN  Clinical Documentation    [email or web address]  Connect via [email or web address] Messenger  Options provided:   -- Mild protein calorie malnutrition   -- Moderate protein calorie malnutrition   -- Severe protein calorie malnutrition   -- Insignificant finding/no effect on this patient's stay and or treatment   -- Other explanation, Please specify other explanation   -- Unable to determine      Query created by: Viky Vaughan on 5/6/2022 2:08 PM    RESPONSE TEXT:    Severe protein calorie malnutrition          Electronically signed by:  CATHIE CRAIG MD 5/6/2022 2:50 PM

## 2022-05-06 NOTE — PROGRESS NOTES
4 Eyes Skin Assessment Completed by Rachel WILL RN and STANLEY Porter.    Head WDL  Ears WDL  Nose WDL  Mouth WDL  Neck WDL  Breast/Chest WDL  Shoulder Blades WDL  Spine WDL  (R) Arm/Elbow/Hand Bruising  (L) Arm/Elbow/Hand WDL  Abdomen WDL  Groin WDL  Scrotum/Coccyx/Buttocks Redness and Blanching  (R) Leg WDL  (L) Leg small abrasion to lateral knee  (R) Heel/Foot/Toe Redness and Blanching  (L) Heel/Foot/Toe Redness and Blanching          Devices In Places Blood Pressure Cuff      Interventions In Place Sacral Mepilex, pillows    Possible Skin Injury No    Pictures Uploaded Into Epic N/A  Wound Consult Placed N/A  RN Wound Prevention Protocol Ordered Yes mepelex to sacrum

## 2022-05-06 NOTE — CARE PLAN
The patient is Stable - Low risk of patient condition declining or worsening    Shift Goals  Clinical Goals: CIWA score less than 14, patient will be free from falls  Patient Goals: Rest comfortably    Progress made toward(s) clinical / shift goals:  Patient verbalizes understanding of plan of care. Concerns and questions addressed. CIWA score has been less than 14 this shift. Call light and belongings within reach. Clean and clutter free environment. Treaded socks on patient. Bed in low, locked position. Bed alarm on.     Patient is not progressing towards the following goals: N/A      Problem: Knowledge Deficit - Standard  Goal: Patient and family/care givers will demonstrate understanding of plan of care, disease process/condition, diagnostic tests and medications  Outcome: Progressing     Problem: Fall Risk  Goal: Patient will remain free from falls  Outcome: Progressing

## 2022-05-06 NOTE — THERAPY
Physical Therapy   Initial Evaluation     Patient Name: Vianney Castro  Age:  62 y.o., Sex:  female  Medical Record #: [id number]  Today's Date: 5/6/2022     Precautions  Precautions: (P) Fall Risk    Assessment  Patient is 62 y.o. female with a diagnosis of ETOH abuse Pt lives at home with  and only ambulates short distances with her husbands total assistance.Acute PT needed to improve transfers ,ambulation and balance      Plan    Recommend Physical Therapy 5 times per week until therapy goals are met for the following treatments:     05/06/22 1100   Charge Group   PT Evaluation PT Evaluation Mod   Total Time Spent   PT Total Time Yes   PT Evaluation Time Spent (Mins) 30   Initial Contact Note    Initial Contact Note Order Received and Verified, Physical Therapy Evaluation in Progress with Full Report to Follow.   Precautions   Precautions Fall Risk   Prior Living Situation   Prior Services Intermittent Physical Support for ADL Per Family   Housing / Facility 2 Story Apartment / Condo   Steps Into Home 23   Steps In Home 0   Equipment Owned None   Lives with - Patient's Self Care Capacity Spouse   Prior Level of Functional Mobility   Bed Mobility Independent   Transfer Status Required Assist   Ambulation Required Assist   Distance Ambulation (Feet)   (household)   Assistive Devices Used None   History of Falls   History of Falls Yes   Passive ROM Lower Body   Passive ROM Lower Body WDL   Active ROM Lower Body    Active ROM Lower Body  WDL   Strength Lower Body   Lower Body Strength  X   Comments general weakness   Sensation Lower Body   Lower Extremity Sensation   WDL   Coordination Lower Body    Coordination Lower Body  WDL   Balance Assessment   Sitting Balance (Static) Fair +   Sitting Balance (Dynamic) Fair +   Standing Balance (Static) Fair   Standing Balance (Dynamic) Fair   Weight Shift Sitting Fair   Weight Shift Standing Fair   Gait Analysis   Gait Level Of Assist Supervised   Assistive Device Front  Wheel Walker   Distance (Feet) 100   # of Times Distance was Traveled 1   Deviation Step To   # of Stairs Climbed 3   Level of Assist with Stairs Supervised   Weight Bearing Status full   Bed Mobility    Supine to Sit Modified Independent   Sit to Supine Modified Independent   Scooting Modified Independent   Functional Mobility   Sit to Stand Supervised   Bed, Chair, Wheelchair Transfer Supervised   Transfer Method Stand Step   How much difficulty does the patient currently have...   Turning over in bed (including adjusting bedclothes, sheets and blankets)? 4   Sitting down on and standing up from a chair with arms (e.g., wheelchair, bedside commode, etc.) 4   Moving from lying on back to sitting on the side of the bed? 4   How much help from another person does the patient currently need...   Moving to and from a bed to a chair (including a wheelchair)? 3   Need to walk in a hospital room? 3   Climbing 3-5 steps with a railing? 3   6 clicks Mobility Score 21   Activity Tolerance   Sitting Edge of Bed 10   Standing 10   Patient / Family Goals    Patient / Family Goal #1 not stated   Short Term Goals    Short Term Goal # 1 I with transfers in 6 V   Short Term Goal # 2 S with ambulation x 200 feet in 6 V   Short Term Goal # 3 S x flight of stairs in 6 V   Problem List    Problems Impaired Ambulation;Functional Strength Deficit;Impaired Balance;Decreased Activity Tolerance   Anticipated Discharge Equipment and Recommendations   DC Equipment Recommendations Front-Wheel Walker   Discharge Recommendations Recommend post-acute placement for additional physical therapy services prior to discharge home   Interdisciplinary Plan of Care Collaboration   IDT Collaboration with  Nursing   Session Information   Date / Session Number  5/6-1 1/5 5/12   Gait Training, Neuro Re-Education / Balance, Stair Training, Therapeutic Activities, and Therapeutic Exercises    DC Equipment Recommendations: (P) Front-Wheel Walker  Discharge  Recommendations: (P) Recommend post-acute placement for additional physical therapy services prior to discharge home

## 2022-05-06 NOTE — DISCHARGE PLANNING
Anticipated Discharge Disposition: Likely home    Action: LSW completed chart review. Per review, OT recommended pa placement.    1000: LSW met with pt at bedside to discuss d/c planning. Pt verified address ([address]) and states PCP is Ilda Salvador on face sheet. Pt lives with spouse (Wenceslao Gottlieb) in 2 story apartment. Pt is unsure of spouse's phone number. Pt states she was previously independent, no DME at home. Pt has prescription coverage via [facility]. Pt states she has a ride if she d/cs home.     Due to pt's [employer] insurance, LSW explained to pt that referrals can be sent to SNF's, but likely pt will need to d/c home. Pt requested some time to think. Pt states she would like to d/c home, but needs to be more mobile. LSW left SNF choice list at bedside.     1030: Discussed pt in IDT rounds. Per MD, pt is not ready to d/c, CIWA 8-10. Per RN staff, pt had 3x fall getting up to bathroom. LSW informed team that due to pt's insurance, pt will likely need to d/c home.    Barriers to Discharge: [employer]    Plan: LSW to follow up with pt to see if she would like SNF referrals sent out.      Care Transition Team Assessment    Information Source  Orientation Level: Oriented X4  Information Given By: Patient  Informant's Name: Vianney  Who is responsible for making decisions for patient? : Patient         Elopement Risk  Legal Hold: No  Ambulatory or Self Mobile in Wheelchair: Yes  Disoriented: No  Psychiatric Symptoms: None  History of Wandering: No  Elopement this Admit: No  Vocalizing Wanting to Leave: No  Displays Behaviors, Body Language Wanting to Leave: No-Not at Risk for Elopement  Elopement Risk: Not at Risk for Elopement    Interdisciplinary Discharge Planning  Primary Care Physician: Ilda Salvador  Lives with - Patient's Self Care Capacity: Spouse  Patient or legal guardian wants to designate a caregiver: No  Support Systems: Parent,Spouse / Significant Other  Housing /  Facility: [address] / Condo  Prior Services: Intermittent Physical Support for ADL Per Family    Discharge Preparedness  What is your plan after discharge?: Home with help  What are your discharge supports?: Spouse,Child  Prior Functional Level: Ambulatory,Independent with Activities of Daily Living  Difficulity with IADLs:  (stairs)    Functional Assesment  Prior Functional Level: Ambulatory,Independent with Activities of Daily Living    Finances  Financial Barriers to Discharge: No  Prescription Coverage: Yes (via [facility])    Vision / Hearing Impairment  Vision Impairment : No  Right Eye Vision: Wears Glasses  Left Eye Vision: Wears Glasses  Hearing Impairment : No         Advance Directive  Advance Directive?: None    Domestic Abuse  Have you ever been the victim of abuse or violence?: No  Physical Abuse or Sexual Abuse: No  Verbal Abuse or Emotional Abuse: No  Possible Abuse/Neglect Reported to:: Not Applicable    Psychological Assessment  History of Substance Abuse: Alcohol    Discharge Risks or Barriers  Discharge risks or barriers?: Substance abuse    Anticipated Discharge Information  Discharge Disposition: Discharged to home/self care (01)  Discharge Address: [address]

## 2022-05-06 NOTE — CARE PLAN
The patient is Watcher - Medium risk of patient condition declining or worsening    Shift Goals  Clinical Goals: remain free from falls, maintain CIWA less than 8  Patient Goals: rest comfortably    Progress made toward(s) clinical / shift goals: Pt calling for assistance when ambulating. CIWA has maintained at an 8-10 throughout shift. Progressing on other goals     Problem: Knowledge Deficit - Standard  Goal: Patient and family/care givers will demonstrate understanding of plan of care, disease process/condition, diagnostic tests and medications  Outcome: Progressing     Problem: Fall Risk  Goal: Patient will remain free from falls  Outcome: Progressing     Problem: Psychosocial  Goal: Patient's level of anxiety will decrease  Outcome: Progressing  Goal: Spiritual and cultural needs incorporated into hospitalization  Outcome: Progressing     Problem: Skin Integrity  Goal: Skin integrity is maintained or improved  Outcome: Progressing     Patient is not progressing towards the following goals:

## 2022-05-06 NOTE — PROGRESS NOTES
Received report from NOC RN. Pt is A&Ox4. VSS. Pt complaining of mild anxiety and fatigue. Pt on CIWA scoring. Updated pt on POC and educated to call for assistance when ambulating. Hourly rounding in place.

## 2022-05-06 NOTE — FACE TO FACE
Face to Face Note  -  Durable Medical Equipment    Shailesh James M.D. - NPI: [id number]  I certify that this patient is under my care and that they had a durable medical equipment(DME)face to face encounter by myself that meets the physician DME face-to-face encounter requirements with this patient on:    Date of encounter:   Patient:                    MRN:                       YOB: 2021  Vianney Castro  [id number]  1960     The encounter with the patient was in whole, or in part, for the following medical condition, which is the primary reason for durable medical equipment:  Other - debility    I certify that, based on my findings, the following durable medical equipment is medically necessary:  Walkers.    FWW

## 2022-05-07 LAB
ALBUMIN SERPL BCP-MCNC: 2.8 G/DL (ref 3.2–4.9)
ALBUMIN/GLOB SERPL: 0.9 G/DL
ALP SERPL-CCNC: 183 U/L (ref 30–99)
ALT SERPL-CCNC: 25 U/L (ref 2–50)
ANION GAP SERPL CALC-SCNC: 10 MMOL/L (ref 7–16)
AST SERPL-CCNC: 73 U/L (ref 12–45)
BILIRUB SERPL-MCNC: 2.4 MG/DL (ref 0.1–1.5)
BUN SERPL-MCNC: 5 MG/DL (ref 8–22)
CALCIUM SERPL-MCNC: 9 MG/DL (ref 8.4–10.2)
CHLORIDE SERPL-SCNC: 98 MMOL/L (ref 96–112)
CO2 SERPL-SCNC: 20 MMOL/L (ref 20–33)
CREAT SERPL-MCNC: 0.26 MG/DL (ref 0.5–1.4)
GFR SERPLBLD CREATININE-BSD FMLA CKD-EPI: 125 ML/MIN/1.73 M 2
GLOBULIN SER CALC-MCNC: 3.2 G/DL (ref 1.9–3.5)
GLUCOSE SERPL-MCNC: 92 MG/DL (ref 65–99)
MAGNESIUM SERPL-MCNC: 1.7 MG/DL (ref 1.5–2.5)
POTASSIUM SERPL-SCNC: 3.6 MMOL/L (ref 3.6–5.5)
PROT SERPL-MCNC: 6 G/DL (ref 6–8.2)
SODIUM SERPL-SCNC: 128 MMOL/L (ref 135–145)

## 2022-05-07 PROCEDURE — 700111 HCHG RX REV CODE 636 W/ 250 OVERRIDE (IP): Performed by: INTERNAL MEDICINE

## 2022-05-07 PROCEDURE — 99232 SBSQ HOSP IP/OBS MODERATE 35: CPT | Performed by: INTERNAL MEDICINE

## 2022-05-07 PROCEDURE — 83735 ASSAY OF MAGNESIUM: CPT

## 2022-05-07 PROCEDURE — 700102 HCHG RX REV CODE 250 W/ 637 OVERRIDE(OP): Performed by: INTERNAL MEDICINE

## 2022-05-07 PROCEDURE — 700102 HCHG RX REV CODE 250 W/ 637 OVERRIDE(OP): Performed by: HOSPITALIST

## 2022-05-07 PROCEDURE — 36415 COLL VENOUS BLD VENIPUNCTURE: CPT

## 2022-05-07 PROCEDURE — 770006 HCHG ROOM/CARE - MED/SURG/GYN SEMI*

## 2022-05-07 PROCEDURE — A9270 NON-COVERED ITEM OR SERVICE: HCPCS | Performed by: INTERNAL MEDICINE

## 2022-05-07 PROCEDURE — 94760 N-INVAS EAR/PLS OXIMETRY 1: CPT

## 2022-05-07 PROCEDURE — 700105 HCHG RX REV CODE 258: Performed by: INTERNAL MEDICINE

## 2022-05-07 PROCEDURE — 97530 THERAPEUTIC ACTIVITIES: CPT

## 2022-05-07 PROCEDURE — 97116 GAIT TRAINING THERAPY: CPT

## 2022-05-07 PROCEDURE — A9270 NON-COVERED ITEM OR SERVICE: HCPCS | Performed by: HOSPITALIST

## 2022-05-07 PROCEDURE — 80053 COMPREHEN METABOLIC PANEL: CPT

## 2022-05-07 RX ORDER — MAGNESIUM SULFATE 1 G/100ML
1 INJECTION INTRAVENOUS ONCE
Status: COMPLETED | OUTPATIENT
Start: 2022-05-07 | End: 2022-05-07

## 2022-05-07 RX ORDER — OXYBUTYNIN CHLORIDE 5 MG/1
5 TABLET ORAL 3 TIMES DAILY
Status: DISCONTINUED | OUTPATIENT
Start: 2022-05-07 | End: 2022-05-08 | Stop reason: HOSPADM

## 2022-05-07 RX ORDER — LANOLIN ALCOHOL/MO/W.PET/CERES
400 CREAM (GRAM) TOPICAL DAILY
Status: DISCONTINUED | OUTPATIENT
Start: 2022-05-07 | End: 2022-05-08 | Stop reason: HOSPADM

## 2022-05-07 RX ADMIN — LORAZEPAM 0.5 MG: 0.5 TABLET ORAL at 21:17

## 2022-05-07 RX ADMIN — LORAZEPAM 2 MG: 1 TABLET ORAL at 09:06

## 2022-05-07 RX ADMIN — LORAZEPAM 3 MG: 1 TABLET ORAL at 07:30

## 2022-05-07 RX ADMIN — Medication 400 MG: at 14:22

## 2022-05-07 RX ADMIN — THIAMINE HCL TAB 100 MG 100 MG: 100 TAB at 07:30

## 2022-05-07 RX ADMIN — NITROFURANTOIN (MONOHYDRATE/MACROCRYSTALS) 100 MG: 75; 25 CAPSULE ORAL at 17:04

## 2022-05-07 RX ADMIN — LORAZEPAM 3 MG: 1 TABLET ORAL at 02:08

## 2022-05-07 RX ADMIN — OXYBUTYNIN CHLORIDE 5 MG: 5 TABLET ORAL at 11:34

## 2022-05-07 RX ADMIN — RIVAROXABAN 10 MG: 10 TABLET, FILM COATED ORAL at 17:04

## 2022-05-07 RX ADMIN — LORAZEPAM 1 MG: 1 TABLET ORAL at 12:24

## 2022-05-07 RX ADMIN — MAGNESIUM SULFATE 1 G: 1 INJECTION INTRAVENOUS at 07:27

## 2022-05-07 RX ADMIN — FOLIC ACID 1 MG: 1 TABLET ORAL at 07:31

## 2022-05-07 RX ADMIN — NITROFURANTOIN (MONOHYDRATE/MACROCRYSTALS) 100 MG: 75; 25 CAPSULE ORAL at 07:31

## 2022-05-07 RX ADMIN — SODIUM CHLORIDE, POTASSIUM CHLORIDE, SODIUM LACTATE AND CALCIUM CHLORIDE: 600; 310; 30; 20 INJECTION, SOLUTION INTRAVENOUS at 10:59

## 2022-05-07 RX ADMIN — OXYBUTYNIN CHLORIDE 5 MG: 5 TABLET ORAL at 17:04

## 2022-05-07 ASSESSMENT — PAIN DESCRIPTION - PAIN TYPE
TYPE: ACUTE PAIN

## 2022-05-07 ASSESSMENT — ENCOUNTER SYMPTOMS
VOMITING: 0
HEADACHES: 0
SHORTNESS OF BREATH: 0
CHILLS: 0
NERVOUS/ANXIOUS: 0
DIZZINESS: 0
FALLS: 0
BACK PAIN: 0
FEVER: 0
ABDOMINAL PAIN: 0
BLURRED VISION: 0
COUGH: 0
HEARTBURN: 0
PALPITATIONS: 0
DOUBLE VISION: 0

## 2022-05-07 ASSESSMENT — LIFESTYLE VARIABLES
ANXIETY: MILDLY ANXIOUS
AGITATION: NORMAL ACTIVITY
TOTAL SCORE: MILD ITCHING, PINS AND NEEDLES SENSATION, BURNING OR NUMBNESS
HEADACHE, FULLNESS IN HEAD: VERY MILD
TOTAL SCORE: 15
TREMOR: TREMOR NOT VISIBLE BUT CAN BE FELT, FINGERTIP TO FINGERTIP
AUDITORY DISTURBANCES: NOT PRESENT
ORIENTATION AND CLOUDING OF SENSORIUM: ORIENTED AND CAN DO SERIAL ADDITIONS
HEADACHE, FULLNESS IN HEAD: VERY MILD
ORIENTATION AND CLOUDING OF SENSORIUM: ORIENTED AND CAN DO SERIAL ADDITIONS
TOTAL SCORE: 20
ORIENTATION AND CLOUDING OF SENSORIUM: ORIENTED AND CAN DO SERIAL ADDITIONS
HEADACHE, FULLNESS IN HEAD: MILD
PAROXYSMAL SWEATS: NO SWEAT VISIBLE
ORIENTATION AND CLOUDING OF SENSORIUM: ORIENTED AND CAN DO SERIAL ADDITIONS
NAUSEA AND VOMITING: NO NAUSEA AND NO VOMITING
PAROXYSMAL SWEATS: NO SWEAT VISIBLE
TOTAL SCORE: 7
AGITATION: *
ANXIETY: *
AGITATION: *
AGITATION: *
NAUSEA AND VOMITING: NO NAUSEA AND NO VOMITING
AGITATION: *
TOTAL SCORE: 7
TREMOR: TREMOR NOT VISIBLE BUT CAN BE FELT, FINGERTIP TO FINGERTIP
ANXIETY: *
AGITATION: *
AGITATION: SOMEWHAT MORE THAN NORMAL ACTIVITY
TOTAL SCORE: VERY MILD ITCHING, PINS AND NEEDLES SENSATION, BURNING OR NUMBNESS
AUDITORY DISTURBANCES: NOT PRESENT
ANXIETY: *
SUBSTANCE_ABUSE: 0
TOTAL SCORE: VERY MILD ITCHING, PINS AND NEEDLES SENSATION, BURNING OR NUMBNESS
ORIENTATION AND CLOUDING OF SENSORIUM: ORIENTED AND CAN DO SERIAL ADDITIONS
AUDITORY DISTURBANCES: NOT PRESENT
AUDITORY DISTURBANCES: NOT PRESENT
VISUAL DISTURBANCES: NOT PRESENT
ANXIETY: MILDLY ANXIOUS
NAUSEA AND VOMITING: NO NAUSEA AND NO VOMITING
AUDITORY DISTURBANCES: NOT PRESENT
NAUSEA AND VOMITING: NO NAUSEA AND NO VOMITING
TREMOR: TREMOR NOT VISIBLE BUT CAN BE FELT, FINGERTIP TO FINGERTIP
TREMOR: TREMOR NOT VISIBLE BUT CAN BE FELT, FINGERTIP TO FINGERTIP
PAROXYSMAL SWEATS: BARELY PERCEPTIBLE SWEATING. PALMS MOIST
NAUSEA AND VOMITING: NO NAUSEA AND NO VOMITING
TOTAL SCORE: 11
TREMOR: TREMOR NOT VISIBLE BUT CAN BE FELT, FINGERTIP TO FINGERTIP
PAROXYSMAL SWEATS: NO SWEAT VISIBLE
PAROXYSMAL SWEATS: NO SWEAT VISIBLE
TOTAL SCORE: 4
AUDITORY DISTURBANCES: NOT PRESENT
VISUAL DISTURBANCES: NOT PRESENT
PAROXYSMAL SWEATS: NO SWEAT VISIBLE
AGITATION: SOMEWHAT MORE THAN NORMAL ACTIVITY
ANXIETY: *
VISUAL DISTURBANCES: NOT PRESENT
VISUAL DISTURBANCES: NOT PRESENT
HEADACHE, FULLNESS IN HEAD: VERY MILD
ORIENTATION AND CLOUDING OF SENSORIUM: ORIENTED AND CAN DO SERIAL ADDITIONS
NAUSEA AND VOMITING: NO NAUSEA AND NO VOMITING
ORIENTATION AND CLOUDING OF SENSORIUM: ORIENTED AND CAN DO SERIAL ADDITIONS
VISUAL DISTURBANCES: NOT PRESENT
NAUSEA AND VOMITING: *
VISUAL DISTURBANCES: NOT PRESENT
TOTAL SCORE: VERY MILD ITCHING, PINS AND NEEDLES SENSATION, BURNING OR NUMBNESS
NAUSEA AND VOMITING: NO NAUSEA AND NO VOMITING
ORIENTATION AND CLOUDING OF SENSORIUM: ORIENTED AND CAN DO SERIAL ADDITIONS
ANXIETY: *
HEADACHE, FULLNESS IN HEAD: SEVERE
PAROXYSMAL SWEATS: BARELY PERCEPTIBLE SWEATING. PALMS MOIST
HEADACHE, FULLNESS IN HEAD: MODERATE
HEADACHE, FULLNESS IN HEAD: MODERATELY SEVERE
AGITATION: MODERATELY FIDGETY AND RESTLESS
TOTAL SCORE: VERY MILD ITCHING, PINS AND NEEDLES SENSATION, BURNING OR NUMBNESS
VISUAL DISTURBANCES: NOT PRESENT
HEADACHE, FULLNESS IN HEAD: MILD
AUDITORY DISTURBANCES: NOT PRESENT
TOTAL SCORE: 10
TREMOR: TREMOR NOT VISIBLE BUT CAN BE FELT, FINGERTIP TO FINGERTIP
PAROXYSMAL SWEATS: NO SWEAT VISIBLE
ANXIETY: MODERATELY ANXIOUS OR GUARDED, SO ANXIETY IS INFERRED
AUDITORY DISTURBANCES: NOT PRESENT
VISUAL DISTURBANCES: NOT PRESENT
TREMOR: TREMOR NOT VISIBLE BUT CAN BE FELT, FINGERTIP TO FINGERTIP
NAUSEA AND VOMITING: NO NAUSEA AND NO VOMITING
TOTAL SCORE: 4
TREMOR: TREMOR NOT VISIBLE BUT CAN BE FELT, FINGERTIP TO FINGERTIP
AUDITORY DISTURBANCES: NOT PRESENT
HEADACHE, FULLNESS IN HEAD: VERY MILD
TOTAL SCORE: MILD ITCHING, PINS AND NEEDLES SENSATION, BURNING OR NUMBNESS
VISUAL DISTURBANCES: NOT PRESENT
ANXIETY: MILDLY ANXIOUS
TOTAL SCORE: 4
ORIENTATION AND CLOUDING OF SENSORIUM: ORIENTED AND CAN DO SERIAL ADDITIONS
TREMOR: *
PAROXYSMAL SWEATS: NO SWEAT VISIBLE

## 2022-05-07 ASSESSMENT — GAIT ASSESSMENTS
GAIT LEVEL OF ASSIST: SUPERVISED
ASSISTIVE DEVICE: FRONT WHEEL WALKER
DEVIATION: STEP TO
DISTANCE (FEET): 100

## 2022-05-07 NOTE — FACE TO FACE
Face to Face Supporting Documentation - Home Health    The encounter with this patient was in whole or in part the primary reason for home health admission.    Date of encounter:   Patient:                    MRN:                       YOB: 2021  Vianney Castro  [id number]  1960     Home health to see patient for:  Skilled Nursing care for assessment, interventions & education, Physical Therapy evaluation and treatment and Occupational therapy evaluation and treatment    Skilled need for:  Exacerbation of Chronic Disease State generalized weakness    Skilled nursing interventions to include:  Comment: as per PT/OT    Homebound status evidenced by:  Needs the assistance of another person in order to leave the home. Leaving home requires a considerable and taxing effort. There is a normal inability to leave the home.    Community Physician to provide follow up care: Pcp Pt States None     Optional Interventions? Yes, Details: as per PT/OT      I certify the face to face encounter for this home health care referral meets the CMS requirements and the encounter/clinical assessment with the patient was, in whole, or in part, for the medical condition(s) listed above, which is the primary reason for home health care. Based on my clinical findings: the service(s) are medically necessary, support the need for home health care, and the homebound criteria are met.  I certify that this patient has had a face to face encounter by myself.  Shailesh James M.D. - NPI: [id number]

## 2022-05-07 NOTE — CARE PLAN
The patient is Stable - Low risk of patient condition declining or worsening    Shift Goals  Clinical Goals: Pt will decrease agitation this shift  Patient Goals: Remain free from injury this shift    Progress made toward(s) clinical / shift goals: On CIWA protocol. Pt is medicated per MAR for agitation and anxiety. Fall precaution in place.    Patient is not progressing towards the following goals:      Problem: Psychosocial  Goal: Patient's level of anxiety will decrease  Outcome: Not Progressing     Problem: Optimal Care for Alcohol Withdrawal  Goal: Optimal Care for the alcohol withdrawal patient  Outcome: Progressing     Problem: Fall Risk  Goal: Patient will remain free from falls  Outcome: Progressing

## 2022-05-07 NOTE — PROGRESS NOTES
"CIWAs this shift in order: 16, 3, 20, 7, 15  Pt received total of 9mg PO Ativan this shift.  Pt c/o headache and abdominal pain twice this shift. Pt states both resolved following Ativan administrations.  Pt voiding. Pt continues to c/o dysuria.  Pt tolerating current diet. Pt ate Lean [employer]isine at 2030. Pt c/o mild N/V which resolved following Ativan administration.  Pt tolerating RA.  Pt ambulating with x1 SBA.   Chart check done.    Pt eager to be discharged as soon as possible to be with her  as pt states  \"has terminal cirrhosis and I'd like to be with him.\" Pt also disheartened having missed her brother's birthday, and that she may miss Mother's Day and her 's birthday as well. Day RN notified.    Pt reports that her father has been denied updates on her condition and is wondering why. Day RN notified.    Pt reports to this RN while getting labs drawn by lab, \"If I don't get any answers today I'm done, I'm outta here.\" Day RN notified.    Pt states she takes oxybutynin 10mg once daily, in the morning, \"so it actually works.\" Day RN notified.  "

## 2022-05-07 NOTE — THERAPY
Physical Therapy   Daily Treatment     Patient Name: Vianney Castro  Age:  62 y.o., Sex:  female  Medical Record #: [id number]  Today's Date: 5/7/2022          Assessment    Pt is making progress,still very weak but appears determined to improve. says can get help for her when he is at work    Plan       05/07/22 1300   Charge Group   PT Gait Training 1   PT Therapeutic Activities 1   Supine Lower Body Exercise   Supine Lower Body Exercises Yes   Sitting Lower Body Exercises   Sitting Lower Body Exercises Yes   Balance   Sitting Balance (Static) Fair +   Sitting Balance (Dynamic) Fair +   Standing Balance (Static) Fair   Standing Balance (Dynamic) Fair   Weight Shift Sitting Fair   Weight Shift Standing Fair   Gait Analysis   Gait Level Of Assist Supervised   Assistive Device Front Wheel Walker   Distance (Feet) 100   # of Times Distance was Traveled 2   Deviation Step To   # of Stairs Climbed 3   Level of Assist with Stairs Supervised   Weight Bearing Status full   Bed Mobility    Supine to Sit Modified Independent   Sit to Supine Modified Independent   Scooting Modified Independent   Functional Mobility   Sit to Stand Supervised   Bed, Chair, Wheelchair Transfer Supervised   Activity Tolerance   Sitting Edge of Bed 10   Standing 10   Short Term Goals    Short Term Goal # 1 I with transfers in 6 V   Goal Outcome # 1 Progressing as expected   Short Term Goal # 2 S with ambulation x 200 feet in 6 V   Goal Outcome # 2 Progressing as expected   Short Term Goal # 3 S x flight of stairs in 6 V   Goal Outcome # 3 Progressing as expected   Anticipated Discharge Equipment and Recommendations   Discharge Recommendations Recommend home health for continued physical therapy services   Interdisciplinary Plan of Care Collaboration   IDT Collaboration with  Nursing   Session Information   Date / Session Number  5/7-2 2/5 5/12   Continue current treatment plan.    DC Equipment Recommendations: Front-Wheel Walker  Discharge  Recommendations: (P) Recommend home health for continued physical therapy services

## 2022-05-07 NOTE — PROGRESS NOTES
"Hospital Medicine Daily Progress Note    Date of Service  5/7/2022    Chief Complaint  Vianney Castro is a 62 y.o. female admitted 5/4/2022 with abdominal pain.    Hospital Course  As per chart review:  \"62 y.o. female who presented 5/4/2022 with past medical history of alcohol abuse, C. difficile colitis, presenting to [facility] with complaining of generalized weakness abdominal pain nausea vomiting dysuria and frequency, patient apparently also having multiple falls, she denies any focal weakness, patient drinks every day last drink was the day before admission, patient denies any fever chills no shortness of breath no cough no chest pain palpitation no history of melena or hematochezia or hemoptysis or hematemesis, patient complaining of lower abdominal pain, pain is constant, moderate, no increasing or decreasing factors, no history of trauma, patient had elevated liver function tests on admission, KUB showed stool in rotation, ultrasound right upper quadrant showed probably fatty liver versus other hepatocellular disease no signs of acute cholecystitis, in the ER her potassium was low 2.5, BUN/creatinine stable, alcohol level 263, UA showed nitrates, CT head was negative, patient was originally admitted to [facility] but due to lack of bed patient was transferred to [facility] as a direct admission, patient was seen in her room she is alert oriented follows commands she just had small bowel movement, she states that her abdominal pain is improved, urinary symptoms have resolved, patient is still feeling anxious and shaky with tremors, but otherwise improved, at this time will continue CIWA protocol continue IV fluids follow-up cultures, supportive treatment.\"    Interval Problem Update  5/5: Patient seen at bedside this morning.  Patient seems anxious.  She has been receiving Ativan for CIWA protocol.  We will continue with IV fluids for now.  Continue to monitor electrolytes.  We will " continue with antibiotics, pending cultures.    5/6: Patient seen at bedside this morning.  Patient still somewhat anxious.  She was called with a letter in her hand, she said that she wanted to go smoke.  I asked her if she wanted some nicotine patches and she agreed.  We are still replacing electrolytes.  PT/OT recommending skilled nursing facility.  Referral has been placed.  We appreciate further recommendations by case management.  I also received a call from the patient's daughter Cydney from number [phone], she called concerned about her mother and asking if it was necessary to pursue guardianship.  Today at the time of my evaluation the patient alert and oriented and able to make her own decisions.  I do not believe that guardianship can be pursued at this time.  We will continue to monitor closely, continue CIWA protocol as needed and replace electrolytes as needed.    5/7: Patient seen at bedside this morning.  Patient mentions she feels very fatigued.  We are still pending placement.  We appreciate further recommendations by case management.  I had a long discussion with the patient today explained to her that I believe that most of her chronic problems are due to her chronic alcohol abuse.  We had a long discussion regarding lifestyle modifications.  She understands.  Continue to monitor.  LFTs seem to be improving.    I have personally seen and examined the patient at bedside. I discussed the plan of care with patient, bedside RN and charge RN.    Consultants/Specialty  None    Code Status  Full Code    Disposition  Patient is not medically cleared for discharge.   Anticipate discharge to to skilled nursing facility.    Review of Systems  Review of Systems   Constitutional: Positive for malaise/fatigue. Negative for chills and fever.   HENT: Negative for hearing loss and nosebleeds.    Eyes: Negative for blurred vision and double vision.   Respiratory: Negative for cough and shortness of breath.     Cardiovascular: Negative for chest pain and palpitations.   Gastrointestinal: Negative for abdominal pain, heartburn and vomiting.   Genitourinary: Negative for dysuria and urgency.   Musculoskeletal: Negative for back pain and falls.   Skin: Negative for itching and rash.   Neurological: Negative for dizziness and headaches.   Psychiatric/Behavioral: Negative for substance abuse. The patient is not nervous/anxious.    All other systems reviewed and are negative.       Physical Exam  Temp:  [36.4 °C (97.5 °F)-36.9 °C (98.5 °F)] 36.4 °C (97.5 °F)  Pulse:  [83-95] 95  Resp:  [17-18] 18  BP: (103-121)/(60-84) 111/74  SpO2:  [94 %-100 %] 99 %    Physical Exam  Vitals and nursing note reviewed.   Constitutional:       General: She is not in acute distress.     Appearance: She is ill-appearing (chronically).   HENT:      Head: Normocephalic and atraumatic.      Right Ear: External ear normal.      Left Ear: External ear normal.      Mouth/Throat:      Pharynx: No oropharyngeal exudate or posterior oropharyngeal erythema.   Eyes:      General:         Right eye: No discharge.         Left eye: No discharge.   Cardiovascular:      Rate and Rhythm: Normal rate and regular rhythm.      Pulses: Normal pulses.      Heart sounds: No murmur heard.    No gallop.   Pulmonary:      Effort: Pulmonary effort is normal. No respiratory distress.      Breath sounds: Normal breath sounds. No wheezing or rhonchi.   Abdominal:      General: Bowel sounds are normal. There is no distension.      Palpations: Abdomen is soft.      Tenderness: There is no abdominal tenderness. There is no guarding.   Musculoskeletal:         General: No swelling or tenderness. Normal range of motion.      Cervical back: Normal range of motion and neck supple. No tenderness.   Skin:     General: Skin is warm and dry.   Neurological:      General: No focal deficit present.      Mental Status: She is alert and oriented to person, place, and time. Mental status is  at baseline.      Motor: No weakness.   Psychiatric:         Mood and Affect: Mood normal.         Behavior: Behavior normal.         Thought Content: Thought content normal.      Comments: Somewhat anxious         Fluids    Intake/Output Summary (Last 24 hours) at 5/7/2022 1345  Last data filed at 5/7/2022 0800  Gross per 24 hour   Intake 4510.15 ml   Output --   Net 4510.15 ml       Laboratory  Recent Labs     05/05/22  0235 05/06/22  0309   WBC 6.5 6.7   RBC 2.90* 3.03*   HEMOGLOBIN 10.4* 10.9*   HEMATOCRIT 30.5* 32.2*   .2* 106.3*   MCH 35.9* 36.0*   MCHC 34.1 33.9   RDW 70.9* 69.8*   PLATELETCT 148* 144*   MPV 10.1 10.4     Recent Labs     05/05/22 0235 05/06/22  0309 05/07/22  0254   SODIUM 130* 130* 128*   POTASSIUM 3.1* 3.0* 3.6   CHLORIDE 97 98 98   CO2 20 20 20   GLUCOSE 84 86 92   BUN 2* 2* 5*   CREATININE 0.23* 0.18* 0.26*   CALCIUM 8.7 9.1 9.0                   Imaging  EC-ECHOCARDIOGRAM COMPLETE W/O CONT   Final Result           Assessment/Plan  * Alcohol dependence with withdrawal (HCC)- (present on admission)  Assessment & Plan  Continue supportive treatment  mtv  Folate  Thiamine  ciwa    Generalized weakness  Assessment & Plan  PT/OT    Hyponatremia  Assessment & Plan  In the setting of alcohol abuse  Continue IVF for now    Constipation- (present on admission)  Assessment & Plan  kub showed large amount of stool  Bowel protocol.    Lactic acidosis- (present on admission)  Assessment & Plan  Improved  Likely multifactorial.     Anemia- (present on admission)  Assessment & Plan  Likely chronic  Elevated MCV  Check ferritin  Vit b12  Iron  No requiring blood transfusion at this time.     Alcohol withdrawal syndrome, uncomplicated (HCC)- (present on admission)  Assessment & Plan  As above.     Urinary tract infection without hematuria- (present on admission)  Assessment & Plan  Continue antibiotics  F/u urine cx.    Transaminitis- (present on admission)  Assessment & Plan  Due to alcohol  abuse  US liver showed The liver is diffusely echogenic, most compatible with fatty infiltration, however other hepatocellular disease processes are in the differential diagnosis, contracted bladder no signs of inflammation.  F/u cmp in am.     Dehydration- (present on admission)  Assessment & Plan  Received iv hydration  Now able to tolerate diet    Hypokalemia- (present on admission)  Assessment & Plan  Replaced  Monitor.        VTE prophylaxis: Xarelto 10 mg daily as prophylaxis    I have performed a physical exam and reviewed and updated ROS and Plan today (5/7/2022). In review of yesterday's note (5/6/2022), there are no changes except as documented above.

## 2022-05-07 NOTE — CARE PLAN
The patient is Watcher - Medium risk of patient condition declining or worsening    Shift Goals  Clinical Goals: Pt will remain free from injury this shift  Patient Goals: Be comfortable enough to sleep    Progress made toward(s) clinical / shift goals:  Pt remained free from injury this shift. Pt able to sleep intermittently throughout shift.    Patient is not progressing towards the following goals:    Problem: Knowledge Deficit - Standard  Goal: Patient and family/care givers will demonstrate understanding of plan of care, disease process/condition, diagnostic tests and medications  Outcome: Not Progressing     Problem: Lifestyle Changes  Goal: Patient's ability to identify lifestyle changes and available resources to help reduce recurrence of condition will improve  Outcome: Not Progressing     Education will be reinforced as required.

## 2022-05-07 NOTE — PROGRESS NOTES
Received bedside report.  Assumed pt care.   Assessment and chart check complete.  Pt is AAOX4, no signs of distress, frequent agitation and anxious noted.  CIWA protocol, Ativan given per protocol.  IVF infusing.  Fall precautions in place, treaded socks on pt, bed in low position, bed alarm on.   Call light within reach.   Educated pt to call if needing anything.   Hourly rounding.

## 2022-05-07 NOTE — PROGRESS NOTES
"Pt found sitting on floor by non-primary RN at 0545. Primary RN and Charge RN notified. RN and Charge RN helped pt back into bed. Pt cleaned up by non-primary RN, charge RN, and primary CNA as pt had BM on floor. Once non-primary RN and charge RN left room, CNA reported to primary RN that pt stated, \"If you want me to stay, you need to find me a place to smoke and drink.\"    Prior to fall pt was A&Ox4 and using call light, not impulsive.   Pt had been toileted at 0521.  Due to sensitivity of bed alarm and bed strip alarm, alarm went off with any movement made by pt in bed.  Pt agitated and anxious by frequent alarming, unable to sleep.  No injuries noted. MD notified 0637. Pharmacy notified 0642.  "

## 2022-05-08 VITALS
BODY MASS INDEX: 16.85 KG/M2 | RESPIRATION RATE: 19 BRPM | TEMPERATURE: 97.8 F | DIASTOLIC BLOOD PRESSURE: 75 MMHG | HEIGHT: 67 IN | SYSTOLIC BLOOD PRESSURE: 104 MMHG | OXYGEN SATURATION: 93 % | HEART RATE: 75 BPM | WEIGHT: 107.36 LBS

## 2022-05-08 LAB
ALBUMIN SERPL BCP-MCNC: 2.6 G/DL (ref 3.2–4.9)
ALBUMIN/GLOB SERPL: 0.8 G/DL
ALP SERPL-CCNC: 166 U/L (ref 30–99)
ALT SERPL-CCNC: 22 U/L (ref 2–50)
ANION GAP SERPL CALC-SCNC: 10 MMOL/L (ref 7–16)
AST SERPL-CCNC: 61 U/L (ref 12–45)
BACTERIA BLD CULT: NORMAL
BACTERIA BLD CULT: NORMAL
BILIRUB SERPL-MCNC: 1.8 MG/DL (ref 0.1–1.5)
BUN SERPL-MCNC: 7 MG/DL (ref 8–22)
CALCIUM SERPL-MCNC: 8.9 MG/DL (ref 8.4–10.2)
CHLORIDE SERPL-SCNC: 100 MMOL/L (ref 96–112)
CO2 SERPL-SCNC: 20 MMOL/L (ref 20–33)
CREAT SERPL-MCNC: 0.19 MG/DL (ref 0.5–1.4)
GFR SERPLBLD CREATININE-BSD FMLA CKD-EPI: 135 ML/MIN/1.73 M 2
GLOBULIN SER CALC-MCNC: 3.3 G/DL (ref 1.9–3.5)
GLUCOSE SERPL-MCNC: 96 MG/DL (ref 65–99)
POTASSIUM SERPL-SCNC: 3.7 MMOL/L (ref 3.6–5.5)
PROT SERPL-MCNC: 5.9 G/DL (ref 6–8.2)
SIGNIFICANT IND 70042: NORMAL
SIGNIFICANT IND 70042: NORMAL
SITE SITE: NORMAL
SITE SITE: NORMAL
SODIUM SERPL-SCNC: 130 MMOL/L (ref 135–145)
SOURCE SOURCE: NORMAL
SOURCE SOURCE: NORMAL

## 2022-05-08 PROCEDURE — 700102 HCHG RX REV CODE 250 W/ 637 OVERRIDE(OP): Performed by: HOSPITALIST

## 2022-05-08 PROCEDURE — 80053 COMPREHEN METABOLIC PANEL: CPT

## 2022-05-08 PROCEDURE — 700102 HCHG RX REV CODE 250 W/ 637 OVERRIDE(OP): Performed by: INTERNAL MEDICINE

## 2022-05-08 PROCEDURE — A9270 NON-COVERED ITEM OR SERVICE: HCPCS | Performed by: HOSPITALIST

## 2022-05-08 PROCEDURE — 36415 COLL VENOUS BLD VENIPUNCTURE: CPT

## 2022-05-08 PROCEDURE — 99239 HOSP IP/OBS DSCHRG MGMT >30: CPT | Performed by: INTERNAL MEDICINE

## 2022-05-08 PROCEDURE — A9270 NON-COVERED ITEM OR SERVICE: HCPCS | Performed by: INTERNAL MEDICINE

## 2022-05-08 RX ORDER — LANOLIN ALCOHOL/MO/W.PET/CERES
400 CREAM (GRAM) TOPICAL DAILY
Qty: 30 TABLET | Refills: 0 | Status: SHIPPED | OUTPATIENT
Start: 2022-05-09

## 2022-05-08 RX ORDER — FOLIC ACID 1 MG/1
1 TABLET ORAL DAILY
Qty: 30 TABLET | Refills: 0 | Status: SHIPPED | OUTPATIENT
Start: 2022-05-09

## 2022-05-08 RX ORDER — LANOLIN ALCOHOL/MO/W.PET/CERES
100 CREAM (GRAM) TOPICAL DAILY
Qty: 30 TABLET | Refills: 0 | Status: SHIPPED | OUTPATIENT
Start: 2022-05-09

## 2022-05-08 RX ADMIN — FOLIC ACID 1 MG: 1 TABLET ORAL at 05:00

## 2022-05-08 RX ADMIN — OXYBUTYNIN CHLORIDE 5 MG: 5 TABLET ORAL at 05:00

## 2022-05-08 RX ADMIN — OXYBUTYNIN CHLORIDE 5 MG: 5 TABLET ORAL at 11:46

## 2022-05-08 RX ADMIN — THIAMINE HCL TAB 100 MG 100 MG: 100 TAB at 05:00

## 2022-05-08 RX ADMIN — LORAZEPAM 0.5 MG: 0.5 TABLET ORAL at 01:03

## 2022-05-08 RX ADMIN — LORAZEPAM 0.5 MG: 0.5 TABLET ORAL at 05:04

## 2022-05-08 RX ADMIN — Medication 400 MG: at 05:00

## 2022-05-08 RX ADMIN — NITROFURANTOIN (MONOHYDRATE/MACROCRYSTALS) 100 MG: 75; 25 CAPSULE ORAL at 07:29

## 2022-05-08 ASSESSMENT — LIFESTYLE VARIABLES
PAROXYSMAL SWEATS: NO SWEAT VISIBLE
HEADACHE, FULLNESS IN HEAD: VERY MILD
VISUAL DISTURBANCES: NOT PRESENT
ANXIETY: MILDLY ANXIOUS
ANXIETY: *
NAUSEA AND VOMITING: NO NAUSEA AND NO VOMITING
TREMOR: TREMOR NOT VISIBLE BUT CAN BE FELT, FINGERTIP TO FINGERTIP
AUDITORY DISTURBANCES: NOT PRESENT
TOTAL SCORE: 6
ANXIETY: MILDLY ANXIOUS
TREMOR: TREMOR NOT VISIBLE BUT CAN BE FELT, FINGERTIP TO FINGERTIP
NAUSEA AND VOMITING: NO NAUSEA AND NO VOMITING
HEADACHE, FULLNESS IN HEAD: NOT PRESENT
ANXIETY: MILDLY ANXIOUS
ORIENTATION AND CLOUDING OF SENSORIUM: CANNOT DO SERIAL ADDITIONS OR IS UNCERTAIN ABOUT DATE
AGITATION: *
AGITATION: NORMAL ACTIVITY
AUDITORY DISTURBANCES: NOT PRESENT
NAUSEA AND VOMITING: NO NAUSEA AND NO VOMITING
ORIENTATION AND CLOUDING OF SENSORIUM: ORIENTED AND CAN DO SERIAL ADDITIONS
TOTAL SCORE: 7
TOTAL SCORE: 2
TOTAL SCORE: 2
HEADACHE, FULLNESS IN HEAD: NOT PRESENT
AGITATION: *
ORIENTATION AND CLOUDING OF SENSORIUM: ORIENTED AND CAN DO SERIAL ADDITIONS
TREMOR: TREMOR NOT VISIBLE BUT CAN BE FELT, FINGERTIP TO FINGERTIP
TREMOR: TREMOR NOT VISIBLE BUT CAN BE FELT, FINGERTIP TO FINGERTIP
AGITATION: NORMAL ACTIVITY
PAROXYSMAL SWEATS: NO SWEAT VISIBLE
AUDITORY DISTURBANCES: NOT PRESENT
VISUAL DISTURBANCES: NOT PRESENT
PAROXYSMAL SWEATS: NO SWEAT VISIBLE
NAUSEA AND VOMITING: NO NAUSEA AND NO VOMITING
VISUAL DISTURBANCES: NOT PRESENT
VISUAL DISTURBANCES: NOT PRESENT
PAROXYSMAL SWEATS: NO SWEAT VISIBLE
ORIENTATION AND CLOUDING OF SENSORIUM: CANNOT DO SERIAL ADDITIONS OR IS UNCERTAIN ABOUT DATE
AUDITORY DISTURBANCES: NOT PRESENT
HEADACHE, FULLNESS IN HEAD: VERY MILD

## 2022-05-08 ASSESSMENT — PAIN DESCRIPTION - PAIN TYPE: TYPE: ACUTE PAIN

## 2022-05-08 NOTE — CARE PLAN
The patient is Stable - Low risk of patient condition declining or worsening    Shift Goals  Clinical Goals: Free from falls and injury, Rest and sleep, Reduced anxiety  Patient Goals: Free from falls and injury, Rest and sleep, Reduced anxiety    Progress made toward(s) clinical / shift goals:  No falls or injuries, reduction in anxiety per patient      Problem: Knowledge Deficit - Standard  Goal: Patient and family/care givers will demonstrate understanding of plan of care, disease process/condition, diagnostic tests and medications  Outcome: Progressing     Problem: Fall Risk  Goal: Patient will remain free from falls  Outcome: Progressing     Problem: Optimal Care for Alcohol Withdrawal  Goal: Optimal Care for the alcohol withdrawal patient  Outcome: Progressing     Problem: Seizure Precautions  Goal: Implementation of seizure precautions  Outcome: Progressing     Problem: Lifestyle Changes  Goal: Patient's ability to identify lifestyle changes and available resources to help reduce recurrence of condition will improve  Outcome: Progressing     Problem: Psychosocial  Goal: Patient's level of anxiety will decrease  Outcome: Progressing  Goal: Spiritual and cultural needs incorporated into hospitalization  Outcome: Progressing     Problem: Risk for Aspiration  Goal: Patient's risk for aspiration will be absent or decrease  Outcome: Progressing     Problem: Pain - Standard  Goal: Alleviation of pain or a reduction in pain to the patient’s comfort goal  Outcome: Progressing     Problem: Skin Integrity  Goal: Skin integrity is maintained or improved  Outcome: Progressing       Patient is not progressing towards the following goals:

## 2022-05-08 NOTE — DISCHARGE INSTRUCTIONS
Discharge Instructions    Discharged to home by car with relative. Discharged via wheelchair, hospital escort: Yes.  Special equipment needed: Not Applicable and Walker    Be sure to schedule a follow-up appointment with your primary care doctor or any specialists as instructed.     Discharge Plan:   Diet Plan: Discussed  Activity Level: Discussed  Smoking Cessation Offered: Patient Counseled  Confirmed Follow up Appointment: Patient to Call and Schedule Appointment  Confirmed Symptoms Management: Discussed  Medication Reconciliation Updated: Yes    I understand that a diet low in cholesterol, fat, and sodium is recommended for good health. Unless I have been given specific instructions below for another diet, I accept this instruction as my diet prescription.   Other diet: Regular    Special Instructions: None    · Is patient discharged on Warfarin / Coumadin?   No     Depression / Suicide Risk    As you are discharged from this [facility] facility, it is important to learn how to keep safe from harming yourself.    Recognize the warning signs:  · Abrupt changes in personality, positive or negative- including increase in energy   · Giving away possessions  · Change in eating patterns- significant weight changes-  positive or negative  · Change in sleeping patterns- unable to sleep or sleeping all the time   · Unwillingness or inability to communicate  · Depression  · Unusual sadness, discouragement and loneliness  · Talk of wanting to die  · Neglect of personal appearance   · Rebelliousness- reckless behavior  · Withdrawal from people/activities they love  · Confusion- inability to concentrate     If you or a loved one observes any of these behaviors or has concerns about self-harm, here's what you can do:  · Talk about it- your feelings and reasons for harming yourself  · Remove any means that you might use to hurt yourself (examples: pills, rope, extension cords, firearm)  · Get professional help from the  community (Mental Health, Substance Abuse, psychological counseling)  · Do not be alone:Call your Safe Contact- someone whom you trust who will be there for you.  · Call your local CRISIS HOTLINE 605-2405 or 223-375-3266  · Call your local Children's Mobile Crisis Response Team [place] (004) 313-4992 or www.NewsPin  · Call the toll free National Suicide Prevention Hotlines   · National Suicide Prevention Lifeline 804-201-GNDW (0585)  · [place] [facility] Line Network 800-SUICIDE (310-0001)      Alcohol Withdrawal Syndrome  When a person who drinks a lot of alcohol stops drinking, he or she may have unpleasant and serious symptoms. These symptoms are called alcohol withdrawal syndrome. This condition may be mild or severe. It can be life-threatening. It can cause:  · Shaking that you cannot control (tremor).  · Sweating.  · Headache.  · Feeling fearful, upset, grouchy, or depressed.  · Trouble sleeping (insomnia).  · Nightmares.  · Fast or uneven heartbeats (palpitations).  · Alcohol cravings.  · Feeling sick to your stomach (nausea).  · Throwing up (vomiting).  · Being bothered by light and sounds.  · Confusion.  · Trouble thinking clearly.  · Not being hungry (loss of appetite).  · Big changes in mood (mood swings).  If you have all of the following symptoms at the same time, get help right away:  · High blood pressure.  · Fast heartbeat.  · Trouble breathing.  · Seizures.  · Seeing, hearing, feeling, smelling, or tasting things that are not there (hallucinations).  These symptoms are known as delirium tremens (DTs). They must be treated at the hospital right away.  Follow these instructions at home:    · Take over-the-counter and prescription medicines only as told by your doctor. This includes vitamins.  · Do not drink alcohol.  · Do not drive until your doctor says that this is safe for you.  · Have someone stay with you or be available in case you need help. This should be someone you trust. This  person can help you with your symptoms. He or she can also help you to not drink.  · Drink enough fluid to keep your pee (urine) pale yellow.  · Think about joining a support group or a treatment program to help you stop drinking.  · Keep all follow-up visits as told by your doctor. This is important.  Contact a doctor if:  · Your symptoms get worse.  · You cannot eat or drink without throwing up.  · You have a hard time not drinking alcohol.  · You cannot stop drinking alcohol.  Get help right away if:  · You have fast or uneven heartbeats.  · You have chest pain.  · You have trouble breathing.  · You have a seizure for the first time.  · You see, hear, feel, smell, or taste something that is not there.  · You get very confused.  Summary  · When a person who drinks a lot of alcohol stops drinking, he or she may have serious symptoms. This is called alcohol withdrawal syndrome.  · Delirium tremens (DTs) is a group of life-threatening symptoms. You should get help right away if you have these symptoms.  · Think about joining an alcohol support group or a treatment program.  This information is not intended to replace advice given to you by your health care provider. Make sure you discuss any questions you have with your health care provider.  Document Released: 06/05/2009 Document Revised: 11/30/2018 Document Reviewed: 08/24/2018  Elsevier Patient Education © 2020 [employer]

## 2022-05-08 NOTE — CARE PLAN
The patient is Stable - Low risk of patient condition declining or worsening    Shift Goals  Clinical Goals: for discharge today  Patient Goals: decrease agitation, safe to go home    Progress made toward(s) clinical / shift goals:  Pt ambulate using FWW steady gait. Denies pain. Fall precaution in place. Agitation decrease .    Patient is not progressing towards the following goals:      Problem: Fall Risk  Goal: Patient will remain free from falls  Outcome: Progressing     Problem: Psychosocial  Goal: Patient's level of anxiety will decrease  Outcome: Progressing     Problem: Mobility  Goal: Patient's capacity to carry out activities will improve  Outcome: Progressing     Problem: Discharge Barriers/Planning  Goal: Patient's continuum of care needs are met  Outcome: Progressing  Flowsheets (Taken 5/8/2022 1246)  Continuum of Care Needs:   Assessed for discharge barriers   Involved patient/family/support system in discharge process   Collaborated with Case Management/   Provided and explained discharge instructions

## 2022-05-08 NOTE — PROGRESS NOTES
Received bedside patient report from STANLEY Ceballos. Patient resting comfortably in bed, no complaints at this time. Safety precautions in place. Will continue to monitor.

## 2022-05-08 NOTE — FACE TO FACE
Face to Face Note  -  Durable Medical Equipment    Shailesh James M.D. - NPI: [id number]  I certify that this patient is under my care and that they had a durable medical equipment(DME)face to face encounter by myself that meets the physician DME face-to-face encounter requirements with this patient on:    Date of encounter:   Patient:                    MRN:                       YOB: 2021  Vianney Castro  [id number]  1960     The encounter with the patient was in whole, or in part, for the following medical condition, which is the primary reason for durable medical equipment:  Other - debility    I certify that, based on my findings, the following durable medical equipment is medically necessary:  Walkers.    FWW

## 2022-05-08 NOTE — PROGRESS NOTES
Received bedside report.  Assumed pt care.   Assessment and chart check complete.  Pt is AAOX4, no signs of distress.  CIWA protocol.  IVF infusing.  FWW delivered at bedside.  Ambulate to the bathroom steady gait.  Called the [employer] for transportation.  Fall precautions in place, treaded socks on pt, bed in low position, bed alarm on.   Call light within reach.   Educated pt to call if needing anything.   Hourly rounding.

## 2022-05-08 NOTE — DISCHARGE SUMMARY
"Discharge Summary    CHIEF COMPLAINT ON ADMISSION  No chief complaint on file.      Reason for Admission  Hypokalemia, alcohol dependence wi*     Admission Date  5/4/2022    CODE STATUS  Full Code    HPI & HOSPITAL COURSE  As per chart review:  \"62 y.o. female who presented 5/4/2022 with past medical history of alcohol abuse, C. difficile colitis, presenting to [facility] with complaining of generalized weakness abdominal pain nausea vomiting dysuria and frequency, patient apparently also having multiple falls, she denies any focal weakness, patient drinks every day last drink was the day before admission, patient denies any fever chills no shortness of breath no cough no chest pain palpitation no history of melena or hematochezia or hemoptysis or hematemesis, patient complaining of lower abdominal pain, pain is constant, moderate, no increasing or decreasing factors, no history of trauma, patient had elevated liver function tests on admission, KUB showed stool in rotation, ultrasound right upper quadrant showed probably fatty liver versus other hepatocellular disease no signs of acute cholecystitis, in the ER her potassium was low 2.5, BUN/creatinine stable, alcohol level 263, UA showed nitrates, CT head was negative, patient was originally admitted to [facility] but due to lack of bed patient was transferred to [facility] as a direct admission, patient was seen in her room she is alert oriented follows commands she just had small bowel movement, she states that her abdominal pain is improved, urinary symptoms have resolved, patient is still feeling anxious and shaky with tremors, but otherwise improved, at this time will continue CIWA protocol continue IV fluids follow-up cultures, supportive treatment.\"    The patient was admitted for further management and care.  The patient was started on a CIWA protocol.  The patient was also diagnosed with UTI and was started on antibiotics.  She has " completed antibiotic treatment while hospitalized.  Currently she is not complaining of any more urinary symptoms.    Most of the patient's comorbidities are due to the patient's alcohol abuse.  I did have a long conversation with the patient about her alcohol drinking, she understands.  I also had a long conversation with the patient's daughter regarding her medical status.  I believe that she has a high risk of readmission if she continues to drink.    Initially physical therapy had recommended skilled nursing facility, however the patient improved significantly while hospitalized.  And are now recommending home health.  The patient does have Medicaid, unclear if she will be able to get home health.  We have placed the order, we appreciate further conditions by case management.  However the patient really wants to go home today.  We will discharge the patient home today.  We have also given the patient a prescription for outpatient PT/OT evaluation.  The patient will require close follow-up with PCP as an outpatient.        Therefore, she is discharged in fair and stable condition to home with close outpatient follow-up.    HH has been ordered however she is medicaid, unclear if she will be able to be set up with medicaid. We have given her also a scrip for outpatient PT/OT.    The patient met 2-midnight criteria for an inpatient stay at the time of discharge.    Discharge Date  05/08/2022    FOLLOW UP ITEMS POST DISCHARGE  Patient will require close follow-up with PCP as an outpatient.  We had a long conversation regarding alcohol abstinence.    DISCHARGE DIAGNOSES  Principal Problem:    Alcohol dependence with withdrawal (HCC) POA: Yes  Active Problems:    Hypokalemia POA: Yes    Dehydration POA: Yes    Transaminitis POA: Yes    Urinary tract infection without hematuria POA: Yes    Alcohol withdrawal syndrome, uncomplicated (HCC) POA: Yes    Anemia POA: Yes    Lactic acidosis POA: Yes    Constipation POA: Yes     Hyponatremia POA: Unknown    Generalized weakness POA: Unknown  Resolved Problems:    * No resolved hospital problems. *      FOLLOW UP  No future appointments.  Primary Care Provider            MEDICATIONS ON DISCHARGE     Medication List      START taking these medications      Instructions   folic acid 1 MG Tabs  Start taking on: May 9, 2022  Commonly known as: FOLVITE   Take 1 Tablet by mouth every day.  Dose: 1 mg     magnesium oxide 400 (240 Mg) MG Tabs  Start taking on: May 9, 2022   Take 1 Tablet by mouth every day.  Dose: 400 mg     thiamine 100 MG tablet  Start taking on: May 9, 2022  Commonly known as: THIAMINE   Take 1 Tablet by mouth every day.  Dose: 100 mg        CONTINUE taking these medications      Instructions   oxybutynin 5 MG Tabs  Commonly known as: DITROPAN   Take 5 mg by mouth 2 times a day.  Dose: 5 mg        STOP taking these medications    neomycin 500 MG Tabs  Commonly known as: MYCIFRADIN            Allergies  No Known Allergies    DIET  Orders Placed This Encounter   Procedures   • Diet Order Diet: Regular     Standing Status:   Standing     Number of Occurrences:   1     Order Specific Question:   Diet:     Answer:   Regular [1]       ACTIVITY  As tolerated. and directed by PT.  Weight bearing as tolerated and directed by PT.    CONSULTATIONS  None    PROCEDURES      EC-ECHOCARDIOGRAM COMPLETE W/O CONT   Final Result      Technically difficult study - adequate information is obtained  Normal right and left ventricular size and function.   The left ventricular ejection fraction is visually estimated to be 55%.  Grade I diastolic dysfunction.  Mildly dilated left atrium.  No evidence of valvular abnormality based on Doppler evaluation.   Right heart pressures are normal.     LABORATORY  Lab Results   Component Value Date    SODIUM 130 (L) 05/08/2022    POTASSIUM 3.7 05/08/2022    CHLORIDE 100 05/08/2022    CO2 20 05/08/2022    GLUCOSE 96 05/08/2022    BUN 7 (L) 05/08/2022    CREATININE  0.19 (L) 05/08/2022    CREATININE 0.6 05/20/2009        Lab Results   Component Value Date    WBC 6.7 05/06/2022    HEMOGLOBIN 10.9 (L) 05/06/2022    HEMATOCRIT 32.2 (L) 05/06/2022    PLATELETCT 144 (L) 05/06/2022        Total time of the discharge process exceeds 35 minutes.

## 2022-05-08 NOTE — PROGRESS NOTES
Discharging patient home per MD order. Pt demonstrated understanding of discharge instructions, follow up appointments, home medications, prescriptions. Pt is voiding without difficulty, pain well controlled, tolerating oral medications, O2 greater than 90%.Pt verbalized understanding of discharge instructions and educational handouts and all questions answered. PIV removed. Pt discharged off unit with escort.

## 2022-05-08 NOTE — PROGRESS NOTES
Charge RN note-    This RN called scheduling to schedule f/u PCP appt. Left VM with pt's name and MRN.

## 2022-06-07 ENCOUNTER — APPOINTMENT (OUTPATIENT)
Dept: RADIOLOGY | Facility: MEDICAL CENTER | Age: 62
End: 2022-06-07
Attending: EMERGENCY MEDICINE
Payer: MEDICAID

## 2022-06-07 ENCOUNTER — HOSPITAL ENCOUNTER (OUTPATIENT)
Facility: MEDICAL CENTER | Age: 62
End: 2022-06-13
Attending: EMERGENCY MEDICINE | Admitting: INTERNAL MEDICINE
Payer: MEDICAID
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
## 2022-06-07 DIAGNOSIS — K85.20 ALCOHOL-INDUCED ACUTE PANCREATITIS, UNSPECIFIED COMPLICATION STATUS: ICD-10-CM

## 2022-06-07 DIAGNOSIS — F10.930 ALCOHOL WITHDRAWAL SYNDROME, UNCOMPLICATED (HCC): ICD-10-CM

## 2022-06-07 DIAGNOSIS — N32.89 BLADDER SPASM: ICD-10-CM

## 2022-06-07 DIAGNOSIS — R11.2 NAUSEA AND VOMITING, INTRACTABILITY OF VOMITING NOT SPECIFIED, UNSPECIFIED VOMITING TYPE: ICD-10-CM

## 2022-06-07 PROBLEM — K62.5 BRIGHT RED BLOOD PER RECTUM: Status: ACTIVE | Noted: 2022-06-07

## 2022-06-07 PROBLEM — Z72.0 TOBACCO ABUSE: Status: ACTIVE | Noted: 2022-06-07

## 2022-06-07 PROBLEM — K85.90 PANCREATITIS: Status: ACTIVE | Noted: 2022-06-07

## 2022-06-07 LAB
ALBUMIN SERPL BCP-MCNC: 3.7 G/DL (ref 3.2–4.9)
ALBUMIN/GLOB SERPL: 1 G/DL
ALP SERPL-CCNC: 171 U/L (ref 30–99)
ALT SERPL-CCNC: 31 U/L (ref 2–50)
ANION GAP SERPL CALC-SCNC: 13 MMOL/L (ref 7–16)
ANION GAP SERPL CALC-SCNC: 17 MMOL/L (ref 7–16)
AST SERPL-CCNC: 123 U/L (ref 12–45)
BASOPHILS # BLD AUTO: 1.4 % (ref 0–1.8)
BASOPHILS # BLD: 0.07 K/UL (ref 0–0.12)
BILIRUB SERPL-MCNC: 1.5 MG/DL (ref 0.1–1.5)
BUN SERPL-MCNC: 4 MG/DL (ref 8–22)
BUN SERPL-MCNC: 6 MG/DL (ref 8–22)
CALCIUM SERPL-MCNC: 9 MG/DL (ref 8.5–10.5)
CALCIUM SERPL-MCNC: 9.3 MG/DL (ref 8.5–10.5)
CHLORIDE SERPL-SCNC: 92 MMOL/L (ref 96–112)
CHLORIDE SERPL-SCNC: 96 MMOL/L (ref 96–112)
CO2 SERPL-SCNC: 26 MMOL/L (ref 20–33)
CO2 SERPL-SCNC: 30 MMOL/L (ref 20–33)
CREAT SERPL-MCNC: 0.24 MG/DL (ref 0.5–1.4)
CREAT SERPL-MCNC: 0.26 MG/DL (ref 0.5–1.4)
EOSINOPHIL # BLD AUTO: 0 K/UL (ref 0–0.51)
EOSINOPHIL NFR BLD: 0 % (ref 0–6.9)
ERYTHROCYTE [DISTWIDTH] IN BLOOD BY AUTOMATED COUNT: 49 FL (ref 35.9–50)
ETHANOL BLD-MCNC: 195.5 MG/DL
GFR SERPLBLD CREATININE-BSD FMLA CKD-EPI: 125 ML/MIN/1.73 M 2
GFR SERPLBLD CREATININE-BSD FMLA CKD-EPI: 127 ML/MIN/1.73 M 2
GLOBULIN SER CALC-MCNC: 3.7 G/DL (ref 1.9–3.5)
GLUCOSE SERPL-MCNC: 90 MG/DL (ref 65–99)
GLUCOSE SERPL-MCNC: 98 MG/DL (ref 65–99)
HCT VFR BLD AUTO: 40.8 % (ref 37–47)
HGB BLD-MCNC: 14.6 G/DL (ref 12–16)
IMM GRANULOCYTES # BLD AUTO: 0.02 K/UL (ref 0–0.11)
IMM GRANULOCYTES NFR BLD AUTO: 0.4 % (ref 0–0.9)
LIPASE SERPL-CCNC: 110 U/L (ref 11–82)
LYMPHOCYTES # BLD AUTO: 2.09 K/UL (ref 1–4.8)
LYMPHOCYTES NFR BLD: 40.7 % (ref 22–41)
MAGNESIUM SERPL-MCNC: 1.5 MG/DL (ref 1.5–2.5)
MCH RBC QN AUTO: 36.1 PG (ref 27–33)
MCHC RBC AUTO-ENTMCNC: 35.8 G/DL (ref 33.6–35)
MCV RBC AUTO: 101 FL (ref 81.4–97.8)
MONOCYTES # BLD AUTO: 0.5 K/UL (ref 0–0.85)
MONOCYTES NFR BLD AUTO: 9.7 % (ref 0–13.4)
NEUTROPHILS # BLD AUTO: 2.46 K/UL (ref 2–7.15)
NEUTROPHILS NFR BLD: 47.8 % (ref 44–72)
NRBC # BLD AUTO: 0 K/UL
NRBC BLD-RTO: 0 /100 WBC
PLATELET # BLD AUTO: 171 K/UL (ref 164–446)
PMV BLD AUTO: 9.2 FL (ref 9–12.9)
POTASSIUM SERPL-SCNC: 2.5 MMOL/L (ref 3.6–5.5)
POTASSIUM SERPL-SCNC: 3.6 MMOL/L (ref 3.6–5.5)
PROT SERPL-MCNC: 7.4 G/DL (ref 6–8.2)
RBC # BLD AUTO: 4.04 M/UL (ref 4.2–5.4)
SODIUM SERPL-SCNC: 135 MMOL/L (ref 135–145)
SODIUM SERPL-SCNC: 139 MMOL/L (ref 135–145)
WBC # BLD AUTO: 5.1 K/UL (ref 4.8–10.8)

## 2022-06-07 PROCEDURE — 82077 ASSAY SPEC XCP UR&BREATH IA: CPT

## 2022-06-07 PROCEDURE — 96366 THER/PROPH/DIAG IV INF ADDON: CPT

## 2022-06-07 PROCEDURE — 99285 EMERGENCY DEPT VISIT HI MDM: CPT

## 2022-06-07 PROCEDURE — 83735 ASSAY OF MAGNESIUM: CPT

## 2022-06-07 PROCEDURE — 700105 HCHG RX REV CODE 258: Performed by: STUDENT IN AN ORGANIZED HEALTH CARE EDUCATION/TRAINING PROGRAM

## 2022-06-07 PROCEDURE — 99406 BEHAV CHNG SMOKING 3-10 MIN: CPT

## 2022-06-07 PROCEDURE — 700111 HCHG RX REV CODE 636 W/ 250 OVERRIDE (IP): Performed by: EMERGENCY MEDICINE

## 2022-06-07 PROCEDURE — 700102 HCHG RX REV CODE 250 W/ 637 OVERRIDE(OP): Performed by: STUDENT IN AN ORGANIZED HEALTH CARE EDUCATION/TRAINING PROGRAM

## 2022-06-07 PROCEDURE — 99220 PR INITIAL OBSERVATION CARE,LEVL III: CPT | Mod: 25 | Performed by: STUDENT IN AN ORGANIZED HEALTH CARE EDUCATION/TRAINING PROGRAM

## 2022-06-07 PROCEDURE — 85025 COMPLETE CBC W/AUTO DIFF WBC: CPT

## 2022-06-07 PROCEDURE — 80048 BASIC METABOLIC PNL TOTAL CA: CPT

## 2022-06-07 PROCEDURE — 96367 TX/PROPH/DG ADDL SEQ IV INF: CPT

## 2022-06-07 PROCEDURE — 700101 HCHG RX REV CODE 250: Performed by: STUDENT IN AN ORGANIZED HEALTH CARE EDUCATION/TRAINING PROGRAM

## 2022-06-07 PROCEDURE — A9270 NON-COVERED ITEM OR SERVICE: HCPCS | Performed by: EMERGENCY MEDICINE

## 2022-06-07 PROCEDURE — 96375 TX/PRO/DX INJ NEW DRUG ADDON: CPT

## 2022-06-07 PROCEDURE — 700105 HCHG RX REV CODE 258: Performed by: EMERGENCY MEDICINE

## 2022-06-07 PROCEDURE — G0378 HOSPITAL OBSERVATION PER HR: HCPCS

## 2022-06-07 PROCEDURE — 96365 THER/PROPH/DIAG IV INF INIT: CPT

## 2022-06-07 PROCEDURE — 94760 N-INVAS EAR/PLS OXIMETRY 1: CPT

## 2022-06-07 PROCEDURE — 99406 BEHAV CHNG SMOKING 3-10 MIN: CPT | Performed by: STUDENT IN AN ORGANIZED HEALTH CARE EDUCATION/TRAINING PROGRAM

## 2022-06-07 PROCEDURE — 83690 ASSAY OF LIPASE: CPT

## 2022-06-07 PROCEDURE — 80053 COMPREHEN METABOLIC PANEL: CPT

## 2022-06-07 PROCEDURE — 36415 COLL VENOUS BLD VENIPUNCTURE: CPT

## 2022-06-07 PROCEDURE — 74019 RADEX ABDOMEN 2 VIEWS: CPT

## 2022-06-07 PROCEDURE — 700102 HCHG RX REV CODE 250 W/ 637 OVERRIDE(OP): Performed by: EMERGENCY MEDICINE

## 2022-06-07 PROCEDURE — A9270 NON-COVERED ITEM OR SERVICE: HCPCS | Performed by: STUDENT IN AN ORGANIZED HEALTH CARE EDUCATION/TRAINING PROGRAM

## 2022-06-07 RX ORDER — LORAZEPAM 2 MG/ML
0.5 INJECTION INTRAMUSCULAR EVERY 4 HOURS PRN
Status: DISCONTINUED | OUTPATIENT
Start: 2022-06-07 | End: 2022-06-09

## 2022-06-07 RX ORDER — LORAZEPAM 2 MG/ML
1.5 INJECTION INTRAMUSCULAR
Status: DISCONTINUED | OUTPATIENT
Start: 2022-06-07 | End: 2022-06-09

## 2022-06-07 RX ORDER — LORAZEPAM 1 MG/1
1 TABLET ORAL EVERY 4 HOURS PRN
Status: DISCONTINUED | OUTPATIENT
Start: 2022-06-07 | End: 2022-06-09

## 2022-06-07 RX ORDER — LORAZEPAM 1 MG/1
2 TABLET ORAL
Status: DISCONTINUED | OUTPATIENT
Start: 2022-06-07 | End: 2022-06-09

## 2022-06-07 RX ORDER — AMOXICILLIN 250 MG
2 CAPSULE ORAL 2 TIMES DAILY
Status: DISCONTINUED | OUTPATIENT
Start: 2022-06-07 | End: 2022-06-13 | Stop reason: HOSPADM

## 2022-06-07 RX ORDER — LORAZEPAM 1 MG/1
3 TABLET ORAL
Status: DISCONTINUED | OUTPATIENT
Start: 2022-06-07 | End: 2022-06-09

## 2022-06-07 RX ORDER — AMOXICILLIN 250 MG
2 CAPSULE ORAL 2 TIMES DAILY
Status: DISCONTINUED | OUTPATIENT
Start: 2022-06-07 | End: 2022-06-07

## 2022-06-07 RX ORDER — POLYETHYLENE GLYCOL 3350 17 G/17G
1 POWDER, FOR SOLUTION ORAL
Status: DISCONTINUED | OUTPATIENT
Start: 2022-06-07 | End: 2022-06-13 | Stop reason: HOSPADM

## 2022-06-07 RX ORDER — SODIUM CHLORIDE, SODIUM LACTATE, POTASSIUM CHLORIDE, CALCIUM CHLORIDE 600; 310; 30; 20 MG/100ML; MG/100ML; MG/100ML; MG/100ML
INJECTION, SOLUTION INTRAVENOUS CONTINUOUS
Status: DISCONTINUED | OUTPATIENT
Start: 2022-06-07 | End: 2022-06-12

## 2022-06-07 RX ORDER — LANOLIN ALCOHOL/MO/W.PET/CERES
400 CREAM (GRAM) TOPICAL DAILY
Status: DISCONTINUED | OUTPATIENT
Start: 2022-06-07 | End: 2022-06-13 | Stop reason: HOSPADM

## 2022-06-07 RX ORDER — LORAZEPAM 2 MG/ML
2 INJECTION INTRAMUSCULAR
Status: DISCONTINUED | OUTPATIENT
Start: 2022-06-07 | End: 2022-06-09

## 2022-06-07 RX ORDER — GAUZE BANDAGE 2" X 2"
100 BANDAGE TOPICAL DAILY
Status: COMPLETED | OUTPATIENT
Start: 2022-06-08 | End: 2022-06-11

## 2022-06-07 RX ORDER — LORAZEPAM 1 MG/1
4 TABLET ORAL
Status: DISCONTINUED | OUTPATIENT
Start: 2022-06-07 | End: 2022-06-09

## 2022-06-07 RX ORDER — BISACODYL 10 MG
10 SUPPOSITORY, RECTAL RECTAL
Status: DISCONTINUED | OUTPATIENT
Start: 2022-06-07 | End: 2022-06-13 | Stop reason: HOSPADM

## 2022-06-07 RX ORDER — LORAZEPAM 1 MG/1
0.5 TABLET ORAL EVERY 4 HOURS PRN
Status: DISCONTINUED | OUTPATIENT
Start: 2022-06-07 | End: 2022-06-09

## 2022-06-07 RX ORDER — POLYETHYLENE GLYCOL 3350 17 G/17G
1 POWDER, FOR SOLUTION ORAL
Status: DISCONTINUED | OUTPATIENT
Start: 2022-06-07 | End: 2022-06-07

## 2022-06-07 RX ORDER — NICOTINE 21 MG/24HR
21 PATCH, TRANSDERMAL 24 HOURS TRANSDERMAL
Status: DISCONTINUED | OUTPATIENT
Start: 2022-06-07 | End: 2022-06-13 | Stop reason: HOSPADM

## 2022-06-07 RX ORDER — FOLIC ACID 1 MG/1
1 TABLET ORAL DAILY
Status: DISCONTINUED | OUTPATIENT
Start: 2022-06-07 | End: 2022-06-07

## 2022-06-07 RX ORDER — SODIUM CHLORIDE, SODIUM LACTATE, POTASSIUM CHLORIDE, CALCIUM CHLORIDE 600; 310; 30; 20 MG/100ML; MG/100ML; MG/100ML; MG/100ML
1000 INJECTION, SOLUTION INTRAVENOUS ONCE
Status: COMPLETED | OUTPATIENT
Start: 2022-06-07 | End: 2022-06-07

## 2022-06-07 RX ORDER — MORPHINE SULFATE 4 MG/ML
1 INJECTION INTRAVENOUS EVERY 4 HOURS PRN
Status: COMPLETED | OUTPATIENT
Start: 2022-06-07 | End: 2022-06-09

## 2022-06-07 RX ORDER — ENOXAPARIN SODIUM 100 MG/ML
40 INJECTION SUBCUTANEOUS DAILY
Status: DISCONTINUED | OUTPATIENT
Start: 2022-06-07 | End: 2022-06-07

## 2022-06-07 RX ORDER — FOLIC ACID 1 MG/1
1 TABLET ORAL DAILY
Status: COMPLETED | OUTPATIENT
Start: 2022-06-08 | End: 2022-06-11

## 2022-06-07 RX ORDER — BISACODYL 10 MG
10 SUPPOSITORY, RECTAL RECTAL
Status: DISCONTINUED | OUTPATIENT
Start: 2022-06-07 | End: 2022-06-07

## 2022-06-07 RX ORDER — POTASSIUM CHLORIDE 20 MEQ/1
40 TABLET, EXTENDED RELEASE ORAL ONCE
Status: COMPLETED | OUTPATIENT
Start: 2022-06-07 | End: 2022-06-07

## 2022-06-07 RX ORDER — LORAZEPAM 2 MG/ML
1 INJECTION INTRAMUSCULAR
Status: DISCONTINUED | OUTPATIENT
Start: 2022-06-07 | End: 2022-06-09

## 2022-06-07 RX ORDER — ONDANSETRON 2 MG/ML
4 INJECTION INTRAMUSCULAR; INTRAVENOUS ONCE
Status: COMPLETED | OUTPATIENT
Start: 2022-06-07 | End: 2022-06-07

## 2022-06-07 RX ORDER — POTASSIUM CHLORIDE 7.45 MG/ML
10 INJECTION INTRAVENOUS ONCE
Status: COMPLETED | OUTPATIENT
Start: 2022-06-07 | End: 2022-06-07

## 2022-06-07 RX ORDER — MORPHINE SULFATE 4 MG/ML
4 INJECTION INTRAVENOUS ONCE
Status: COMPLETED | OUTPATIENT
Start: 2022-06-07 | End: 2022-06-07

## 2022-06-07 RX ORDER — GAUZE BANDAGE 2" X 2"
100 BANDAGE TOPICAL DAILY
Status: DISCONTINUED | OUTPATIENT
Start: 2022-06-07 | End: 2022-06-07

## 2022-06-07 RX ADMIN — POTASSIUM CHLORIDE 10 MEQ: 7.46 INJECTION, SOLUTION INTRAVENOUS at 13:31

## 2022-06-07 RX ADMIN — POTASSIUM CHLORIDE 40 MEQ: 1500 TABLET, EXTENDED RELEASE ORAL at 17:12

## 2022-06-07 RX ADMIN — MORPHINE SULFATE 4 MG: 4 INJECTION INTRAVENOUS at 13:10

## 2022-06-07 RX ADMIN — POTASSIUM CHLORIDE 40 MEQ: 1500 TABLET, EXTENDED RELEASE ORAL at 13:08

## 2022-06-07 RX ADMIN — THIAMINE HYDROCHLORIDE: 100 INJECTION, SOLUTION INTRAMUSCULAR; INTRAVENOUS at 17:12

## 2022-06-07 RX ADMIN — SODIUM CHLORIDE, POTASSIUM CHLORIDE, SODIUM LACTATE AND CALCIUM CHLORIDE 1000 ML: 600; 310; 30; 20 INJECTION, SOLUTION INTRAVENOUS at 13:09

## 2022-06-07 RX ADMIN — NICOTINE TRANSDERMAL SYSTEM 21 MG: 21 PATCH, EXTENDED RELEASE TRANSDERMAL at 21:18

## 2022-06-07 RX ADMIN — Medication 400 MG: at 17:58

## 2022-06-07 RX ADMIN — SODIUM CHLORIDE, POTASSIUM CHLORIDE, SODIUM LACTATE AND CALCIUM CHLORIDE: 600; 310; 30; 20 INJECTION, SOLUTION INTRAVENOUS at 21:19

## 2022-06-07 RX ADMIN — SODIUM CHLORIDE, POTASSIUM CHLORIDE, SODIUM LACTATE AND CALCIUM CHLORIDE 1000 ML: 600; 310; 30; 20 INJECTION, SOLUTION INTRAVENOUS at 13:10

## 2022-06-07 RX ADMIN — LORAZEPAM 2 MG: 1 TABLET ORAL at 21:31

## 2022-06-07 RX ADMIN — ONDANSETRON HYDROCHLORIDE 4 MG: 2 SOLUTION INTRAMUSCULAR; INTRAVENOUS at 13:10

## 2022-06-07 ASSESSMENT — ENCOUNTER SYMPTOMS
NECK PAIN: 0
BRUISES/BLEEDS EASILY: 0
BLURRED VISION: 0
HEARTBURN: 0
DOUBLE VISION: 0
DIZZINESS: 0
TREMORS: 1
CONSTIPATION: 1
VOMITING: 1
CHILLS: 0
BLOOD IN STOOL: 1
COUGH: 0
DEPRESSION: 0
NAUSEA: 1
HEMOPTYSIS: 0
HEADACHES: 0
ABDOMINAL PAIN: 1
FEVER: 0
MYALGIAS: 0
PALPITATIONS: 0

## 2022-06-07 ASSESSMENT — LIFESTYLE VARIABLES
TREMOR: TREMOR NOT VISIBLE BUT CAN BE FELT, FINGERTIP TO FINGERTIP
ANXIETY: MILDLY ANXIOUS
AUDITORY DISTURBANCES: NOT PRESENT
TACTILE DISTURBANCES: MODERATE ITCHING, PINS AND NEEDLES SENSATION, BURNING OR NUMBNESS
VISUAL DISTURBANCES: NOT PRESENT
AUDITORY DISTURBANCES: NOT PRESENT
ORIENTATION AND CLOUDING OF SENSORIUM: ORIENTED AND CAN DO SERIAL ADDITIONS
TREMOR: NO TREMOR
TOTAL SCORE: 12
HEADACHE, FULLNESS IN HEAD: NOT PRESENT
AUDITORY DISTURBANCES: NOT PRESENT
TACTILE DISTURBANCES: MILD ITCHING, PINS AND NEEDLES SENSATION, BURNING OR NUMBNESS
HEADACHE, FULLNESS IN HEAD: MILD
NAUSEA AND VOMITING: NO NAUSEA AND NO VOMITING
AGITATION: NORMAL ACTIVITY
NAUSEA AND VOMITING: NO NAUSEA AND NO VOMITING
ANXIETY: NO ANXIETY (AT EASE)
ORIENTATION AND CLOUDING OF SENSORIUM: ORIENTED AND CAN DO SERIAL ADDITIONS
TOTAL SCORE: 5
PAROXYSMAL SWEATS: NO SWEAT VISIBLE
VISUAL DISTURBANCES: NOT PRESENT
VISUAL DISTURBANCES: NOT PRESENT
ORIENTATION AND CLOUDING OF SENSORIUM: ORIENTED AND CAN DO SERIAL ADDITIONS
SUBSTANCE_ABUSE: 1
TREMOR: *
TOTAL SCORE: 0
AGITATION: NORMAL ACTIVITY
ANXIETY: MILDLY ANXIOUS
NAUSEA AND VOMITING: *
TOTAL SCORE: 0
AUDITORY DISTURBANCES: NOT PRESENT
PAROXYSMAL SWEATS: BARELY PERCEPTIBLE SWEATING. PALMS MOIST
VISUAL DISTURBANCES: NOT PRESENT
ORIENTATION AND CLOUDING OF SENSORIUM: ORIENTED AND CAN DO SERIAL ADDITIONS
TREMOR: NO TREMOR
HEADACHE, FULLNESS IN HEAD: NOT PRESENT
AGITATION: SOMEWHAT MORE THAN NORMAL ACTIVITY
AGITATION: NORMAL ACTIVITY
ANXIETY: NO ANXIETY (AT EASE)
PAROXYSMAL SWEATS: NO SWEAT VISIBLE
PAROXYSMAL SWEATS: NO SWEAT VISIBLE
NAUSEA AND VOMITING: MILD NAUSEA WITH NO VOMITING
HEADACHE, FULLNESS IN HEAD: NOT PRESENT

## 2022-06-07 ASSESSMENT — FIBROSIS 4 INDEX: FIB4 SCORE: 5.51

## 2022-06-07 NOTE — ED PROVIDER NOTES
"ED Provider Note    CHIEF COMPLAINT  Chief Complaint   Patient presents with   • Abdominal Pain       HPI  Vianney Castro is a 62 y.o. female who presents with a chief complaint of abdominal pain, constipation, nausea, vomiting, and alcohol abuse.  Patient reports she developed severe left lower quadrant abdominal pain 2 weeks ago.  She tried a \"correct all\" for some constipation that was associated with the pain but this has been unhelpful.  Her last bowel movement was 1 week ago and there was some bright red blood with it.  She has not anticoagulated.  She has had several episodes of nonbloody, nonbilious emesis but has not been eating any solid food because every time she eats it makes her abdominal pain worse.  She continues to drink alcohol, her most recent drink was several hours ago and she had 2 shots of hard liquor.  She denies any fevers or chills, chest pain or shortness of breath, dysuria, or hematuria.    REVIEW OF SYSTEMS  See HPI for further details.  Abdominal pain.  Constipation.  Nausea.  Vomiting.  Alcohol abuse.  Hematochezia.  All other systems are negative.     PAST MEDICAL HISTORY   has a past medical history of Encephalitis and Hyperlipidemia.    SOCIAL HISTORY  Social History     Tobacco Use   • Smoking status: Current Every Day Smoker   • Smokeless tobacco: Not on file   Substance and Sexual Activity   • Alcohol use: Yes     Comment: daily   • Drug use: No   • Sexual activity: Not on file       SURGICAL HISTORY   has a past surgical history that includes treat ectopic preg,non remval; tonsillectomy; and other orthopedic surgery.    CURRENT MEDICATIONS  Home Medications     Reviewed by Stephanie Swain R.N. (Registered Nurse) on 06/07/22 at 1130  Med List Status: Partial   Medication Last Dose Status   folic acid (FOLVITE) 1 MG Tab  Active   magnesium oxide 400 (240 Mg) MG Tab  Active   oxybutynin (DITROPAN) 5 MG Tab  Active   thiamine (THIAMINE) 100 MG tablet  Active          " "      ALLERGIES  No Known Allergies    PHYSICAL EXAM  VITAL SIGNS: /80   Pulse 98   Temp 36.9 °C (98.5 °F) (Temporal)   Resp 16   Ht 1.676 m (5' 6\")   Wt 48.5 kg (107 lb)   SpO2 95%   BMI 17.27 kg/m²    Pulse ox interpretation: I interpret this pulse ox as normal.  Constitutional: Alert in no apparent distress.  HENT: No signs of trauma, Bilateral external ears normal, Nose normal.  Very dry mucous membranes.  Eyes: Pupils are equal and reactive, Conjunctiva normal, Non-icteric.   Neck: Normal range of motion, No tenderness, Supple, No stridor.   Lymphatic: No lymphadenopathy noted.   Cardiovascular: Regular rate and rhythm, no murmurs. Pulses symmetrical.  Thorax & Lungs: Normal breath sounds, No respiratory distress, No wheezing, No chest tenderness.   Abdomen: Bowel sounds normal, Soft, diffuse tenderness worst in the left lower quadrant and suprapubic region, No masses, No pulsatile masses. No peritoneal signs.  Skin: Warm, Dry, No erythema, No rash.   Back: Normal alignment.   Extremities: No cyanosis.  Musculoskeletal: No major deformities noted.   Neurologic: Alert, No focal deficits noted.   Psychiatric: Affect normal, Judgment normal, Mood normal.     DIAGNOSTIC STUDIES / PROCEDURES    LABS  Results for orders placed or performed during the hospital encounter of 06/07/22   CBC with Differential   Result Value Ref Range    WBC 5.1 4.8 - 10.8 K/uL    RBC 4.04 (L) 4.20 - 5.40 M/uL    Hemoglobin 14.6 12.0 - 16.0 g/dL    Hematocrit 40.8 37.0 - 47.0 %    .0 (H) 81.4 - 97.8 fL    MCH 36.1 (H) 27.0 - 33.0 pg    MCHC 35.8 (H) 33.6 - 35.0 g/dL    RDW 49.0 35.9 - 50.0 fL    Platelet Count 171 164 - 446 K/uL    MPV 9.2 9.0 - 12.9 fL    Neutrophils-Polys 47.80 44.00 - 72.00 %    Lymphocytes 40.70 22.00 - 41.00 %    Monocytes 9.70 0.00 - 13.40 %    Eosinophils 0.00 0.00 - 6.90 %    Basophils 1.40 0.00 - 1.80 %    Immature Granulocytes 0.40 0.00 - 0.90 %    Nucleated RBC 0.00 /100 WBC    Neutrophils " (Absolute) 2.46 2.00 - 7.15 K/uL    Lymphs (Absolute) 2.09 1.00 - 4.80 K/uL    Monos (Absolute) 0.50 0.00 - 0.85 K/uL    Eos (Absolute) 0.00 0.00 - 0.51 K/uL    Baso (Absolute) 0.07 0.00 - 0.12 K/uL    Immature Granulocytes (abs) 0.02 0.00 - 0.11 K/uL    NRBC (Absolute) 0.00 K/uL   Complete Metabolic Panel   Result Value Ref Range    Sodium 139 135 - 145 mmol/L    Potassium 2.5 (LL) 3.6 - 5.5 mmol/L    Chloride 92 (L) 96 - 112 mmol/L    Co2 30 20 - 33 mmol/L    Anion Gap 17.0 (H) 7.0 - 16.0    Glucose 90 65 - 99 mg/dL    Bun 6 (L) 8 - 22 mg/dL    Creatinine 0.26 (L) 0.50 - 1.40 mg/dL    Calcium 9.3 8.5 - 10.5 mg/dL    AST(SGOT) 123 (H) 12 - 45 U/L    ALT(SGPT) 31 2 - 50 U/L    Alkaline Phosphatase 171 (H) 30 - 99 U/L    Total Bilirubin 1.5 0.1 - 1.5 mg/dL    Albumin 3.7 3.2 - 4.9 g/dL    Total Protein 7.4 6.0 - 8.2 g/dL    Globulin 3.7 (H) 1.9 - 3.5 g/dL    A-G Ratio 1.0 g/dL   Lipase   Result Value Ref Range    Lipase 110 (H) 11 - 82 U/L   ESTIMATED GFR   Result Value Ref Range    GFR (CKD-EPI) 125 >60 mL/min/1.73 m 2   DIAGNOSTIC ALCOHOL   Result Value Ref Range    Diagnostic Alcohol 195.5 (H) <10.1 mg/dL   MAGNESIUM   Result Value Ref Range    Magnesium 1.5 1.5 - 2.5 mg/dL     RADIOLOGY  [id number] VIEWS   Final Result      Moderate amount of colonic stool.        COURSE & MEDICAL DECISION MAKING  Pertinent Labs & Imaging studies reviewed. (See chart for details)  Records obtained and reviewed: Patient was most recently admitted to this facility 5/4/2022 for hypokalemia and alcohol withdrawal.  She was placed on CIWA and started on antibiotics for UTI.  PT recommended home health but it was unclear if she could get this given her insurance so she was discharged with a prescription for outpatient PT/OT.    This is a 62-year-old female with a history of alcohol abuse who is here with left-sided abdominal pain, nausea, vomiting, and constipation.  Differential diagnosis includes, but is not limited to,  pancreatitis, colitis, fecal impaction, obstipation, constipation, diverticulitis.    Arrives afebrile with normal vital signs.  Appears very dehydrated with dry mucous membranes but nontoxic.  Abdomen is soft without peritoneal signs but is tender on the left side.  Remainder of the physical exam is reassuring.    Patient was started on IV fluids for clinical evidence of dehydration.  X-ray of the abdomen demonstrates a moderate amount of colonic stool.    Patient has no leukocytosis or left shift.  Metabolic panel does demonstrate multiple derangements including hypokalemia to 2.5 with a chloride of 92 and an anion gap of 17.  She has normal renal function with an AST of 123.  Lipase is elevated to 110.  Diagnostic alcohol is elevated to 195.5.    Patient's potassium was repleted both orally and via IV.  She has been given pain and nausea medication.  She will require hospitalization for additional work-up and management.  She is on [facility] protocol.    Patient was discussed with the hospitalist and admitted in guarded condition    HYDRATION  HYDRATION: Based on the patient's presentation of Dehydration the patient was given IV fluids. IV Hydration was used because oral hydration was not adequate alone. Upon recheck following hydration, the patient was Improved.    FINAL IMPRESSION  1.  Pancreatitis  2.  Dehydration  3.  Alcohol abuse  4.  Constipation     Electronically signed by: Timothy Valle M.D., 6/7/2022 12:44 PM

## 2022-06-07 NOTE — ED TRIAGE NOTES
Vitals:    06/07/22 1119   BP: 116/80   Pulse: 98   Resp: 16   Temp: 36.9 °C (98.5 °F)   SpO2: 95%     Chief Complaint   Patient presents with   • Abdominal Pain     Pt was dx with C. Diff colitis last month. Finished ABX. Now with increasing lower abdominal pain and now constipation. Pt with hx of incontinence and is wearing a brief.    Pt BIB EMS, wheeled to and from triage, alert and oriented x 4.
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
## 2022-06-08 LAB
HCT VFR BLD AUTO: 33.7 % (ref 37–47)
HCT VFR BLD AUTO: 38.1 % (ref 37–47)
HGB BLD-MCNC: 12 G/DL (ref 12–16)
HGB BLD-MCNC: 13.4 G/DL (ref 12–16)

## 2022-06-08 PROCEDURE — 97165 OT EVAL LOW COMPLEX 30 MIN: CPT

## 2022-06-08 PROCEDURE — 700111 HCHG RX REV CODE 636 W/ 250 OVERRIDE (IP): Performed by: STUDENT IN AN ORGANIZED HEALTH CARE EDUCATION/TRAINING PROGRAM

## 2022-06-08 PROCEDURE — G0378 HOSPITAL OBSERVATION PER HR: HCPCS

## 2022-06-08 PROCEDURE — 85018 HEMOGLOBIN: CPT

## 2022-06-08 PROCEDURE — A9270 NON-COVERED ITEM OR SERVICE: HCPCS | Performed by: STUDENT IN AN ORGANIZED HEALTH CARE EDUCATION/TRAINING PROGRAM

## 2022-06-08 PROCEDURE — 96375 TX/PRO/DX INJ NEW DRUG ADDON: CPT

## 2022-06-08 PROCEDURE — 700111 HCHG RX REV CODE 636 W/ 250 OVERRIDE (IP): Performed by: EMERGENCY MEDICINE

## 2022-06-08 PROCEDURE — 700102 HCHG RX REV CODE 250 W/ 637 OVERRIDE(OP): Performed by: STUDENT IN AN ORGANIZED HEALTH CARE EDUCATION/TRAINING PROGRAM

## 2022-06-08 PROCEDURE — 96367 TX/PROPH/DG ADDL SEQ IV INF: CPT

## 2022-06-08 PROCEDURE — 97163 PT EVAL HIGH COMPLEX 45 MIN: CPT

## 2022-06-08 PROCEDURE — 85014 HEMATOCRIT: CPT

## 2022-06-08 PROCEDURE — 36415 COLL VENOUS BLD VENIPUNCTURE: CPT

## 2022-06-08 PROCEDURE — 96376 TX/PRO/DX INJ SAME DRUG ADON: CPT

## 2022-06-08 PROCEDURE — 96366 THER/PROPH/DIAG IV INF ADDON: CPT

## 2022-06-08 PROCEDURE — 99225 PR SUBSEQUENT OBSERVATION CARE,LEVEL II: CPT | Performed by: INTERNAL MEDICINE

## 2022-06-08 RX ADMIN — Medication 400 MG: at 06:33

## 2022-06-08 RX ADMIN — LORAZEPAM 1 MG: 2 INJECTION INTRAMUSCULAR; INTRAVENOUS at 01:10

## 2022-06-08 RX ADMIN — THERA TABS 1 TABLET: TAB at 06:33

## 2022-06-08 RX ADMIN — NICOTINE TRANSDERMAL SYSTEM 21 MG: 21 PATCH, EXTENDED RELEASE TRANSDERMAL at 06:34

## 2022-06-08 RX ADMIN — MORPHINE SULFATE 1 MG: 4 INJECTION INTRAVENOUS at 20:27

## 2022-06-08 RX ADMIN — Medication 100 MG: at 06:00

## 2022-06-08 RX ADMIN — FOLIC ACID 1 MG: 1 TABLET ORAL at 06:00

## 2022-06-08 RX ADMIN — MORPHINE SULFATE 1 MG: 4 INJECTION INTRAVENOUS at 23:55

## 2022-06-08 ASSESSMENT — COGNITIVE AND FUNCTIONAL STATUS - GENERAL
TURNING FROM BACK TO SIDE WHILE IN FLAT BAD: A LITTLE
MOVING FROM LYING ON BACK TO SITTING ON SIDE OF FLAT BED: A LOT
MOBILITY SCORE: 16
DAILY ACTIVITIY SCORE: 19
MOVING TO AND FROM BED TO CHAIR: A LITTLE
SUGGESTED CMS G CODE MODIFIER DAILY ACTIVITY: CK
TOILETING: A LITTLE
DRESSING REGULAR LOWER BODY CLOTHING: A LITTLE
WALKING IN HOSPITAL ROOM: A LITTLE
DRESSING REGULAR UPPER BODY CLOTHING: A LITTLE
HELP NEEDED FOR BATHING: A LITTLE
SUGGESTED CMS G CODE MODIFIER MOBILITY: CK
STANDING UP FROM CHAIR USING ARMS: A LITTLE
CLIMB 3 TO 5 STEPS WITH RAILING: A LOT
PERSONAL GROOMING: A LITTLE

## 2022-06-08 ASSESSMENT — LIFESTYLE VARIABLES
TOTAL SCORE: 2
SUBSTANCE_ABUSE: 1
TOTAL SCORE: 3
VISUAL DISTURBANCES: NOT PRESENT
TREMOR: NO TREMOR
PAROXYSMAL SWEATS: BARELY PERCEPTIBLE SWEATING. PALMS MOIST
ORIENTATION AND CLOUDING OF SENSORIUM: ORIENTED AND CAN DO SERIAL ADDITIONS
AUDITORY DISTURBANCES: NOT PRESENT
AGITATION: NORMAL ACTIVITY
ANXIETY: *
PAROXYSMAL SWEATS: BARELY PERCEPTIBLE SWEATING. PALMS MOIST
ORIENTATION AND CLOUDING OF SENSORIUM: ORIENTED AND CAN DO SERIAL ADDITIONS
NAUSEA AND VOMITING: NO NAUSEA AND NO VOMITING
TREMOR: MODERATE TREMOR WITH ARMS EXTENDED
AGITATION: NORMAL ACTIVITY
TREMOR: TREMOR NOT VISIBLE BUT CAN BE FELT, FINGERTIP TO FINGERTIP
AGITATION: NORMAL ACTIVITY
HEADACHE, FULLNESS IN HEAD: VERY MILD
AGITATION: NORMAL ACTIVITY
TOTAL SCORE: 3
ANXIETY: MILDLY ANXIOUS
AUDITORY DISTURBANCES: NOT PRESENT
HEADACHE, FULLNESS IN HEAD: NOT PRESENT
VISUAL DISTURBANCES: NOT PRESENT
ORIENTATION AND CLOUDING OF SENSORIUM: ORIENTED AND CAN DO SERIAL ADDITIONS
TREMOR: TREMOR NOT VISIBLE BUT CAN BE FELT, FINGERTIP TO FINGERTIP
TOTAL SCORE: MILD ITCHING, PINS AND NEEDLES SENSATION, BURNING OR NUMBNESS
PAROXYSMAL SWEATS: BARELY PERCEPTIBLE SWEATING. PALMS MOIST
ANXIETY: MILDLY ANXIOUS
NAUSEA AND VOMITING: NO NAUSEA AND NO VOMITING
ORIENTATION AND CLOUDING OF SENSORIUM: ORIENTED AND CAN DO SERIAL ADDITIONS
NAUSEA AND VOMITING: NO NAUSEA AND NO VOMITING
HEADACHE, FULLNESS IN HEAD: NOT PRESENT
ORIENTATION AND CLOUDING OF SENSORIUM: ORIENTED AND CAN DO SERIAL ADDITIONS
ANXIETY: MILDLY ANXIOUS
HEADACHE, FULLNESS IN HEAD: NOT PRESENT
ANXIETY: MILDLY ANXIOUS
AUDITORY DISTURBANCES: NOT PRESENT
VISUAL DISTURBANCES: NOT PRESENT
AUDITORY DISTURBANCES: NOT PRESENT
TREMOR: TREMOR NOT VISIBLE BUT CAN BE FELT, FINGERTIP TO FINGERTIP
PAROXYSMAL SWEATS: BARELY PERCEPTIBLE SWEATING. PALMS MOIST
AUDITORY DISTURBANCES: NOT PRESENT
TREMOR: TREMOR NOT VISIBLE BUT CAN BE FELT, FINGERTIP TO FINGERTIP
VISUAL DISTURBANCES: NOT PRESENT
PAROXYSMAL SWEATS: NO SWEAT VISIBLE
PAROXYSMAL SWEATS: BARELY PERCEPTIBLE SWEATING. PALMS MOIST
AGITATION: NORMAL ACTIVITY
AGITATION: *
VISUAL DISTURBANCES: NOT PRESENT
ANXIETY: NO ANXIETY (AT EASE)
NAUSEA AND VOMITING: NO NAUSEA AND NO VOMITING
NAUSEA AND VOMITING: NO NAUSEA AND NO VOMITING
ORIENTATION AND CLOUDING OF SENSORIUM: ORIENTED AND CAN DO SERIAL ADDITIONS
NAUSEA AND VOMITING: MILD NAUSEA WITH NO VOMITING
TOTAL SCORE: 1
TOTAL SCORE: 14
HEADACHE, FULLNESS IN HEAD: NOT PRESENT
HEADACHE, FULLNESS IN HEAD: NOT PRESENT
TOTAL SCORE: 3
VISUAL DISTURBANCES: NOT PRESENT
AUDITORY DISTURBANCES: NOT PRESENT

## 2022-06-08 ASSESSMENT — ENCOUNTER SYMPTOMS
DIARRHEA: 0
NAUSEA: 0
BLOOD IN STOOL: 0
WEAKNESS: 1
DEPRESSION: 0
SHORTNESS OF BREATH: 0
CHILLS: 0
MYALGIAS: 0
ABDOMINAL PAIN: 0
FEVER: 0
DIZZINESS: 0
HEADACHES: 0
VOMITING: 0
FALLS: 1

## 2022-06-08 ASSESSMENT — GAIT ASSESSMENTS
DISTANCE (FEET): 20
ASSISTIVE DEVICE: FRONT WHEEL WALKER
GAIT LEVEL OF ASSIST: MINIMAL ASSIST

## 2022-06-08 ASSESSMENT — ACTIVITIES OF DAILY LIVING (ADL): TOILETING: INDEPENDENT

## 2022-06-08 NOTE — H&P
Hospital Medicine History & Physical Note    Date of Service  6/7/2022    Primary Care Physician  Pcp Pt States None    Consultants  none    Specialist Names: none     Code Status  Full Code    Chief Complaint  Chief Complaint   Patient presents with   • Abdominal Pain       History of Presenting Illness  Vianney Castro is a 62 y.o. female with pmhx of alcohol abuse, pancreatitis, tobacco abuse who presented 6/7/2022 with abdominal pain with associated nausea and vomiting for the last 1 week which has progressively worsening.no relieving or exacerbating factors. She reports have bright red blood on tissue when defecating. She reports drinking every day with her last drink being this morning of 2 shots. She reports current tobacco use and is requesting nicotine patch. She denies any fever, chills, or chest pain.     In the Er, she was noted to have a potassium of 2.5 and an elevated lipase. On exam patient seems tremulous. Her alcohol was elevated on arrival.    I discussed the plan of care with patient.    Review of Systems  Review of Systems   Constitutional: Negative for chills and fever.   HENT: Negative for hearing loss and tinnitus.    Eyes: Negative for blurred vision and double vision.   Respiratory: Negative for cough and hemoptysis.    Cardiovascular: Negative for chest pain and palpitations.   Gastrointestinal: Positive for abdominal pain, blood in stool, constipation, nausea and vomiting. Negative for heartburn.   Genitourinary: Negative for dysuria and urgency.   Musculoskeletal: Negative for myalgias and neck pain.   Skin: Negative for itching and rash.   Neurological: Positive for tremors. Negative for dizziness and headaches.   Endo/Heme/Allergies: Does not bruise/bleed easily.   Psychiatric/Behavioral: Positive for substance abuse. Negative for depression and suicidal ideas.       Past Medical History   has a past medical history of Encephalitis and Hyperlipidemia.    Surgical History   has a past  surgical history that includes pr treat ectopic preg,non remval; tonsillectomy; and other orthopedic surgery.     Family History  family history is not on file.   Family history reviewed with patient. There is no family history that is pertinent to the chief complaint.     Social History   reports that she has been smoking. She does not have any smokeless tobacco history on file. She reports current alcohol use. She reports that she does not use drugs.    Allergies  No Known Allergies    Medications  Prior to Admission Medications   Prescriptions Last Dose Informant Patient Reported? Taking?   folic acid (FOLVITE) 1 MG Tab >week at am Patient No No   Sig: Take 1 Tablet by mouth every day.   magnesium oxide 400 (240 Mg) MG Tab >week at am Patient No No   Sig: Take 1 Tablet by mouth every day.   oxybutynin (DITROPAN) 5 MG Tab >week at am Patient Yes No   Sig: Take 5 mg by mouth 2 times a day.   thiamine (THIAMINE) 100 MG tablet >week at am Patient No No   Sig: Take 1 Tablet by mouth every day.      Facility-Administered Medications: None       Physical Exam  Temp:  [36.9 °C (98.5 °F)] 36.9 °C (98.5 °F)  Pulse:  [83-98] 84  Resp:  [12-17] 12  BP: (113-116)/(72-80) 113/72  SpO2:  [95 %] 95 %  Blood Pressure: 113/72   Temperature: 36.9 °C (98.5 °F)   Pulse: 84   Respiration: 12   Pulse Oximetry: 95 %       Physical Exam  Vitals and nursing note reviewed.   Constitutional:       Appearance: Normal appearance.   HENT:      Head: Normocephalic and atraumatic.      Right Ear: Tympanic membrane normal.      Left Ear: Tympanic membrane normal.      Nose: Nose normal.      Mouth/Throat:      Mouth: Mucous membranes are dry.      Pharynx: Oropharynx is clear.   Eyes:      Extraocular Movements: Extraocular movements intact.      Pupils: Pupils are equal, round, and reactive to light.   Cardiovascular:      Rate and Rhythm: Normal rate and regular rhythm.      Pulses: Normal pulses.      Heart sounds: Normal heart sounds.    Pulmonary:      Effort: Pulmonary effort is normal. No respiratory distress.      Breath sounds: Normal breath sounds. No stridor.   Abdominal:      General: Bowel sounds are normal. There is no distension.      Palpations: Abdomen is soft. There is no mass.      Tenderness: There is abdominal tenderness.   Musculoskeletal:         General: Normal range of motion.      Cervical back: Neck supple.      Comments: tremulous   Skin:     General: Skin is warm.      Capillary Refill: Capillary refill takes less than 2 seconds.   Neurological:      General: No focal deficit present.      Mental Status: She is alert and oriented to person, place, and time. Mental status is at baseline.   Psychiatric:         Mood and Affect: Mood normal.         Behavior: Behavior normal.         Laboratory:  Recent Labs     06/07/22  1150   WBC 5.1   RBC 4.04*   HEMOGLOBIN 14.6   HEMATOCRIT 40.8   .0*   MCH 36.1*   MCHC 35.8*   RDW 49.0   PLATELETCT 171   MPV 9.2     Recent Labs     06/07/22  1150   SODIUM 139   POTASSIUM 2.5*   CHLORIDE 92*   CO2 30   GLUCOSE 90   BUN 6*   CREATININE 0.26*   CALCIUM 9.3     Recent Labs     06/07/22  1150   ALTSGPT 31   ASTSGOT 123*   ALKPHOSPHAT 171*   TBILIRUBIN 1.5   LIPASE 110*   GLUCOSE 90         No results for input(s): NTPROBNP in the last 72 hours.      No results for input(s): TROPONINT in the last 72 hours.    Imaging:  [id number] [id number]   Final Result      Moderate amount of colonic stool.          X-Ray:  I have personally reviewed the images and compared with prior images.    Assessment/Plan:  Justification for Admission Status  I anticipate this patient is appropriate for observation status at this time because hypokalemia requiring iv potassium replacement.    * Hypokalemia- (present on admission)  Assessment & Plan  Tele   Replenished   Monitor     Tobacco abuse  Assessment & Plan  Education provided including patches, gum. Agreed to nicotine patches. Time spent 9  minutes.    Bright red blood per rectum  Assessment & Plan  Likely secondary to constipation and hard stool   Bowel regimen   Monitor H/H     Pancreatitis  Assessment & Plan  Acute on chronic   Clear liquids   IVF   Pain control   Counseled on alcohol cessation     CN (constipation)  Assessment & Plan  Bowel regimen with stool softeners     Alcohol withdrawal (HCC)- (present on admission)  Assessment & Plan  ciwa  Monitor for DT's  IVF  Thiamine/folic acid/multi vitamins        VTE prophylaxis: SCDs/TEDs

## 2022-06-08 NOTE — PROGRESS NOTES
Pt stated she had to use the restroom. Bedside commode next to bed - writer instructed pt to use commode. Pt stood up, urinated and stooled on floor. Bedding and gown changed, pt cleaned. Brief placed on pt. Pt transferred to clean room.

## 2022-06-08 NOTE — ASSESSMENT & PLAN NOTE
Likely secondary to constipation and hard stool   Only reported 1 episode   Bowel regimen   H&H stable     Denies any further blood with BMs, resolved

## 2022-06-08 NOTE — DISCHARGE PLANNING
[facility] Transitional Care Coordination    Referral from:  Dr Dao  Insurance Provider on Facesheet: [place] [employer]  Potential Rehab Diagnosis: Debility    Chart review indicates patient may need on going medical management and may have therapy needs to possibly meet inpatient rehab facility criteria with the goal of returning to community.    D/C support: Lives in 2nd floor apartment with S/O who is debilitated, daughter expressed safety concerns.      Physiatry consultation pended per protocol.     Debility, ETOH - physiatry consult pended, awaiting OT eval. TCC will follow.     Thank you for the referral.

## 2022-06-08 NOTE — THERAPY
Physical Therapy   Initial Evaluation     Patient Name: Vianney Castro  Age:  62 y.o., Sex:  female  Medical Record #: [id number]  Today's Date: 6/8/2022     Precautions  Precautions: Fall Risk    Assessment  Pt is a 62 y.o. female with a PMH of ETOH, pancreatitis, and tobacco abuse. Pt presents to ED with abdominal pain, N/V, and bright red blood in her stool. Pt and family member state that the patient has progressively been getting weaker and there are worried that she may have a fall that results in a significant injury. Pt performed functional mobility tasks with PT, and demonstrated current mobility status that is below her functional baseline. See below for details. Pt demonstrated increased weakness in B LE, increased knee flexion with gait, and some coordination deficits with L LE during gait that required physical and verbal cues to correct. Pt will benefit from skilled physical therapy services and PT will follow while in house.     Plan    Recommend Physical Therapy 3 times per week until therapy goals are met for the following treatments:  Bed Mobility, Equipment, Gait Training, Manual Therapy, Neuro Re-Education / Balance, Self Care/Home Evaluation, Stair Training, Therapeutic Activities, and Therapeutic Exercises    DC Equipment Recommendations: Unable to determine at this time (most likely FWW, which pt owns)  Discharge Recommendations: Recommend post-acute placement for additional physical therapy services prior to discharge home. Pt did state that she would rather go home and do home health services, but given the flight of stairs to enter apartment the pt may not be able to safely DC home.             Objective     06/08/22 1344   Initial Contact Note    Initial Contact Note Order Received and Verified, Physical Therapy Evaluation in Progress with Full Report to Follow.   Precautions   Precautions Fall Risk   Prior Living Situation   Prior Services None   Housing / Facility 2 Story Apartment / Condo    Steps Into Home 25   Steps In Home 0   Elevator No   Equipment Owned Front-Wheel Walker;Single Point Cane   Lives with - Patient's Self Care Capacity Significant Other   Comments According to daughter, who lives in [place], the pt's SO also has some medical problems, including a fall earlier this week with a LOC. Daughter is worried that their current home situation may not be the safest.   Prior Level of Functional Mobility   Bed Mobility Independent   Transfer Status Independent   Ambulation Independent   Distance Ambulation (Feet)   (Household)   Assistive Devices Used Front-Wheel Walker   Stairs Independent   Comments Pt and daughter state that the pt was progressively been getting weaker   Cognition    Cognition / Consciousness WDL   Passive ROM Lower Body   Passive ROM Lower Body WDL   Active ROM Lower Body    Active ROM Lower Body  WDL   Comments Limited by general weakness   Strength Lower Body   Lower Body Strength  X   Gross Strength Generalized Weakness, Equal Bilaterally   Comments B LE 3-/5   Sensation Lower Body   Lower Extremity Sensation   WDL   Coordination Lower Body    Comments Pt demonstrated difficult with L LE during gait, it seemed to wander outside of the FWW multiple times and required VC to correct   Balance Assessment   Sitting Balance (Static) Fair   Sitting Balance (Dynamic) Fair -   Standing Balance (Static) Poor   Standing Balance (Dynamic) Poor -   Weight Shift Sitting Fair   Weight Shift Standing Poor   Gait Analysis   Gait Level Of Assist Minimal Assist   Assistive Device Front Wheel Walker   Distance (Feet) 20   # of Times Distance was Traveled 1   Deviation Bradykinetic;Shuffled Gait;Decreased Toe Off;Decreased Heel Strike;Decreased Base Of Support;Step To  (L LE Abduction)   Weight Bearing Status No restrictions   Comments Pt states that this is below her baseline   Bed Mobility    Supine to Sit Standby Assist   Sit to Supine Contact Guard Assist   Scooting Standby Assist    Rolling Standby Assist   Functional Mobility   Sit to Stand Minimal Assist   Bed, Chair, Wheelchair Transfer Minimal Assist   Transfer Method Stand Step   Mobility sup to sit, EOB, STS, gait, BTB   How much difficulty does the patient currently have...   Turning over in bed (including adjusting bedclothes, sheets and blankets)? 3   Sitting down on and standing up from a chair with arms (e.g., wheelchair, bedside commode, etc.) 2   Moving from lying on back to sitting on the side of the bed? 3   How much help from another person does the patient currently need...   Moving to and from a bed to a chair (including a wheelchair)? 3   Need to walk in a hospital room? 3   Climbing 3-5 steps with a railing? 2   6 clicks Mobility Score 16   Activity Tolerance   Sitting Edge of Bed 8 min   Standing 10 min   Patient / Family Goals    Patient / Family Goal #1 Get stronger   Short Term Goals    Short Term Goal # 1 Pt will complete STS with SPV within 6 visits to improve functional mobility   Short Term Goal # 2 Pt will ambulate 250' with LRAD with SPV within 6 visits to improve functional mobility   Short Term Goal # 3 Pt will ascend/descend 25 stairs with SBA within 6 visits to allow for home access   Education Group   Education Provided Role of Physical Therapist;Gait Training   Role of Physical Therapist Patient Response Patient;Family;Acceptance;Explanation;Verbal Demonstration   Gait Training Patient Response Patient;Acceptance;Explanation;Verbal Demonstration;Reinforcement Needed;Action Demonstration   Problem List    Problems Impaired Transfers;Impaired Bed Mobility;Impaired Ambulation;Functional Strength Deficit;Impaired Balance;Impaired Coordination;Decreased Activity Tolerance;Motor Planning / Sequencing   Anticipated Discharge Equipment and Recommendations   DC Equipment Recommendations Unable to determine at this time  (most likely FWW, which pt own)   Discharge Recommendations Recommend post-acute placement for  additional physical therapy services prior to discharge home   Interdisciplinary Plan of Care Collaboration   IDT Collaboration with  Nursing;Family / Caregiver   Patient Position at End of Therapy In Bed;Tray Table within Reach;Family / Friend in Room;Phone within Reach;Call Light within Reach   Collaboration Comments PT findings and recs   Session Information   Date / Session Number  6/8-1(1/3,6/14)

## 2022-06-08 NOTE — ASSESSMENT & PLAN NOTE
Reports multiple falls at home with walker   PT/OT- post acute placement   SNF referral and PM&R consulted

## 2022-06-08 NOTE — ED NOTES
Bedside commode next to bed. Pt sitting on edge of bed and urinated and had BM on bed. Pt bedding and gown changed.

## 2022-06-08 NOTE — ASSESSMENT & PLAN NOTE
Acute on chronic   Increase diet to full liquids, advance as tolerated   IVF   Pain control   Counseled on alcohol cessation

## 2022-06-08 NOTE — PROGRESS NOTES
Hospital Medicine Daily Progress Note    Date of Service  6/8/2022    Chief Complaint  Vianney Castro is a 62 y.o. female admitted 6/7/2022 with abdominal pain, nausea, and vomiting    Hospital Course  62 y.o. female with pmhx of alcohol abuse, pancreatitis, tobacco abuse who presented 6/7/2022 with abdominal pain with associated nausea and vomiting for the last 1 week which has progressively worsening.no relieving or exacerbating factors. She reports have bright red blood on tissue when defecating x1 had significant constipation. She reports drinking every day with her last drink being this morning of 2 shots.  In the ER patient found to have significant hypokalemia, elevated lipase, and alcohol level 195.  Abdominal x-ray showed moderate amount of colonic stool.  Patient admitted for alcohol withdrawal and acute on chronic pancreatitis started on IV fluids.    Interval Problem Update  Patient reports her abdominal pain is improved this morning no longer nauseous. CIWA 3 this morning.  She has had multiple bowel movements since admission and denies any further blood.  Patient mildly tachycardic this morning otherwise vital signs stable.  Patient also reports that she has been having multiple falls at home even with her walker.  Her  is unable to help care for her as he is sick due to cirrhosis. PT recommending post acute placement, OT pending.     I have personally seen and examined the patient at bedside. I discussed the plan of care with patient and bedside RN.    Consultants/Specialty  N/A    Code Status  Full Code    Disposition  Patient is medically cleared for discharge.   Anticipate discharge to to skilled nursing facility vs rehab   I have placed the appropriate orders for post-discharge needs.    Review of Systems  Review of Systems   Constitutional: Positive for malaise/fatigue. Negative for chills and fever.   Respiratory: Negative for shortness of breath.    Cardiovascular: Negative for chest pain.    Gastrointestinal: Negative for abdominal pain, blood in stool, diarrhea, nausea and vomiting.   Genitourinary: Negative for dysuria.   Musculoskeletal: Positive for falls. Negative for myalgias.   Skin: Negative for rash.   Neurological: Positive for weakness. Negative for dizziness and headaches.   Psychiatric/Behavioral: Positive for substance abuse. Negative for depression.   All other systems reviewed and are negative.       Physical Exam  Temp:  [36.5 °C (97.7 °F)] 36.5 °C (97.7 °F)  Pulse:  [] 104  Resp:  [12-16] 16  BP: (120-133)/(72-89) 123/72  SpO2:  [93 %-96 %] 93 %    Physical Exam  Vitals and nursing note reviewed.   Constitutional:       General: She is not in acute distress.     Appearance: She is ill-appearing (chronically).   HENT:      Head: Normocephalic and atraumatic.   Eyes:      Conjunctiva/sclera: Conjunctivae normal.   Cardiovascular:      Rate and Rhythm: Regular rhythm. Tachycardia present.      Pulses: Normal pulses.      Heart sounds: Normal heart sounds.   Pulmonary:      Effort: Pulmonary effort is normal. No respiratory distress.      Breath sounds: Normal breath sounds. No wheezing or rales.   Abdominal:      General: Abdomen is flat. There is no distension.      Palpations: Abdomen is soft.      Tenderness: There is no abdominal tenderness.   Musculoskeletal:         General: Normal range of motion.      Cervical back: Normal range of motion and neck supple.      Right lower leg: No edema.      Left lower leg: No edema.   Skin:     General: Skin is warm and dry.   Neurological:      General: No focal deficit present.      Mental Status: She is alert and oriented to person, place, and time.      Cranial Nerves: No cranial nerve deficit.   Psychiatric:         Mood and Affect: Mood normal.         Behavior: Behavior normal.         Fluids  No intake or output data in the 24 hours ending 06/08/22 1410    Laboratory  Recent Labs     06/07/22  1150 06/08/22  0310 06/08/22  1254    WBC 5.1  --   --    RBC 4.04*  --   --    HEMOGLOBIN 14.6 12.0 13.4   HEMATOCRIT 40.8 33.7* 38.1   .0*  --   --    MCH 36.1*  --   --    MCHC 35.8*  --   --    RDW 49.0  --   --    PLATELETCT 171  --   --    MPV 9.2  --   --      Recent Labs     06/07/22  1150 06/07/22  2320   SODIUM 139 135   POTASSIUM 2.5* 3.6   CHLORIDE 92* 96   CO2 30 26   GLUCOSE 90 98   BUN 6* 4*   CREATININE 0.26* 0.24*   CALCIUM 9.3 9.0                   Imaging  [id number] VIEWS   Final Result      Moderate amount of colonic stool.           Assessment/Plan  * Hypokalemia- (present on admission)  Assessment & Plan  Likely from alcohol use  Replenished   Repeat wnl, dc tele   Continue to monitor BMP    Falls- (present on admission)  Assessment & Plan  Reports multiple falls at home with walker   PT/OT  PT- post acute placement   SNF referral and PM&R consulted    Tobacco abuse  Assessment & Plan  Education provided including patches, gum. Agreed to nicotine patches. Time spent 9 minutes.    Bright red blood per rectum  Assessment & Plan  Likely secondary to constipation and hard stool   Bowel regimen   Monitor H/H     Denies any further blood with BMs    Pancreatitis  Assessment & Plan  Acute on chronic   Increase diet to full liquids, advance as tolerated   IVF   Pain control   Counseled on alcohol cessation     CN (constipation)  Assessment & Plan  Bowel regimen with stool softeners     Alcohol withdrawal (HCC)- (present on admission)  Assessment & Plan  ciwa  Monitor for DT's  IVF  Thiamine/folic acid/multi vitamins         VTE prophylaxis: SCDs/TEDs    I have performed a physical exam and reviewed and updated ROS and Plan today (6/8/2022). In review of yesterday's note (6/7/2022), there are no changes except as documented above.

## 2022-06-09 PROBLEM — E83.39 HYPOPHOSPHATEMIA: Status: ACTIVE | Noted: 2022-06-09

## 2022-06-09 LAB
ALBUMIN SERPL BCP-MCNC: 3.2 G/DL (ref 3.2–4.9)
ALBUMIN SERPL BCP-MCNC: 3.4 G/DL (ref 3.2–4.9)
BUN SERPL-MCNC: 3 MG/DL (ref 8–22)
BUN SERPL-MCNC: 3 MG/DL (ref 8–22)
CALCIUM SERPL-MCNC: 9.2 MG/DL (ref 8.5–10.5)
CALCIUM SERPL-MCNC: 9.6 MG/DL (ref 8.5–10.5)
CHLORIDE SERPL-SCNC: 91 MMOL/L (ref 96–112)
CHLORIDE SERPL-SCNC: 94 MMOL/L (ref 96–112)
CO2 SERPL-SCNC: 25 MMOL/L (ref 20–33)
CO2 SERPL-SCNC: 28 MMOL/L (ref 20–33)
CREAT SERPL-MCNC: 0.17 MG/DL (ref 0.5–1.4)
CREAT SERPL-MCNC: 0.29 MG/DL (ref 0.5–1.4)
GFR SERPLBLD CREATININE-BSD FMLA CKD-EPI: 121 ML/MIN/1.73 M 2
GFR SERPLBLD CREATININE-BSD FMLA CKD-EPI: 138 ML/MIN/1.73 M 2
GLUCOSE SERPL-MCNC: 87 MG/DL (ref 65–99)
GLUCOSE SERPL-MCNC: 92 MG/DL (ref 65–99)
MAGNESIUM SERPL-MCNC: 1.5 MG/DL (ref 1.5–2.5)
PHOSPHATE SERPL-MCNC: 1.9 MG/DL (ref 2.5–4.5)
PHOSPHATE SERPL-MCNC: 4.3 MG/DL (ref 2.5–4.5)
POTASSIUM SERPL-SCNC: 2.8 MMOL/L (ref 3.6–5.5)
POTASSIUM SERPL-SCNC: 3.5 MMOL/L (ref 3.6–5.5)
SODIUM SERPL-SCNC: 130 MMOL/L (ref 135–145)
SODIUM SERPL-SCNC: 134 MMOL/L (ref 135–145)

## 2022-06-09 PROCEDURE — G0378 HOSPITAL OBSERVATION PER HR: HCPCS

## 2022-06-09 PROCEDURE — 700102 HCHG RX REV CODE 250 W/ 637 OVERRIDE(OP): Performed by: INTERNAL MEDICINE

## 2022-06-09 PROCEDURE — 700111 HCHG RX REV CODE 636 W/ 250 OVERRIDE (IP): Performed by: STUDENT IN AN ORGANIZED HEALTH CARE EDUCATION/TRAINING PROGRAM

## 2022-06-09 PROCEDURE — 96376 TX/PRO/DX INJ SAME DRUG ADON: CPT

## 2022-06-09 PROCEDURE — 83735 ASSAY OF MAGNESIUM: CPT

## 2022-06-09 PROCEDURE — 700102 HCHG RX REV CODE 250 W/ 637 OVERRIDE(OP): Performed by: STUDENT IN AN ORGANIZED HEALTH CARE EDUCATION/TRAINING PROGRAM

## 2022-06-09 PROCEDURE — A9270 NON-COVERED ITEM OR SERVICE: HCPCS | Performed by: STUDENT IN AN ORGANIZED HEALTH CARE EDUCATION/TRAINING PROGRAM

## 2022-06-09 PROCEDURE — 96367 TX/PROPH/DG ADDL SEQ IV INF: CPT

## 2022-06-09 PROCEDURE — 99225 PR SUBSEQUENT OBSERVATION CARE,LEVEL II: CPT | Performed by: INTERNAL MEDICINE

## 2022-06-09 PROCEDURE — A9270 NON-COVERED ITEM OR SERVICE: HCPCS | Performed by: PHYSICAL MEDICINE & REHABILITATION

## 2022-06-09 PROCEDURE — A9270 NON-COVERED ITEM OR SERVICE: HCPCS | Performed by: INTERNAL MEDICINE

## 2022-06-09 PROCEDURE — 700102 HCHG RX REV CODE 250 W/ 637 OVERRIDE(OP): Performed by: PHYSICAL MEDICINE & REHABILITATION

## 2022-06-09 PROCEDURE — 96366 THER/PROPH/DIAG IV INF ADDON: CPT

## 2022-06-09 PROCEDURE — 700101 HCHG RX REV CODE 250: Performed by: INTERNAL MEDICINE

## 2022-06-09 PROCEDURE — 99205 OFFICE O/P NEW HI 60 MIN: CPT | Mod: 25 | Performed by: PHYSICAL MEDICINE & REHABILITATION

## 2022-06-09 PROCEDURE — 302146: Performed by: INTERNAL MEDICINE

## 2022-06-09 PROCEDURE — 80069 RENAL FUNCTION PANEL: CPT

## 2022-06-09 PROCEDURE — 99406 BEHAV CHNG SMOKING 3-10 MIN: CPT | Performed by: PHYSICAL MEDICINE & REHABILITATION

## 2022-06-09 PROCEDURE — 700105 HCHG RX REV CODE 258: Performed by: INTERNAL MEDICINE

## 2022-06-09 PROCEDURE — 36415 COLL VENOUS BLD VENIPUNCTURE: CPT

## 2022-06-09 RX ORDER — POTASSIUM CHLORIDE 20 MEQ/1
40 TABLET, EXTENDED RELEASE ORAL ONCE
Status: COMPLETED | OUTPATIENT
Start: 2022-06-09 | End: 2022-06-09

## 2022-06-09 RX ORDER — MORPHINE SULFATE 4 MG/ML
1 INJECTION INTRAVENOUS EVERY 4 HOURS PRN
Status: DISCONTINUED | OUTPATIENT
Start: 2022-06-09 | End: 2022-06-13 | Stop reason: HOSPADM

## 2022-06-09 RX ORDER — DOCUSATE SODIUM 100 MG/1
100 CAPSULE, LIQUID FILLED ORAL 2 TIMES DAILY
Status: DISCONTINUED | OUTPATIENT
Start: 2022-06-09 | End: 2022-06-13 | Stop reason: HOSPADM

## 2022-06-09 RX ORDER — OXYCODONE HYDROCHLORIDE 5 MG/1
5 TABLET ORAL EVERY 4 HOURS PRN
Status: DISCONTINUED | OUTPATIENT
Start: 2022-06-09 | End: 2022-06-13 | Stop reason: HOSPADM

## 2022-06-09 RX ORDER — POTASSIUM CHLORIDE 20 MEQ/1
40 TABLET, EXTENDED RELEASE ORAL DAILY
Status: DISCONTINUED | OUTPATIENT
Start: 2022-06-09 | End: 2022-06-13 | Stop reason: HOSPADM

## 2022-06-09 RX ADMIN — POTASSIUM CHLORIDE 40 MEQ: 1500 TABLET, EXTENDED RELEASE ORAL at 16:05

## 2022-06-09 RX ADMIN — POTASSIUM PHOSPHATE, MONOBASIC AND POTASSIUM PHOSPHATE, DIBASIC 30 MMOL: 224; 236 INJECTION, SOLUTION, CONCENTRATE INTRAVENOUS at 07:55

## 2022-06-09 RX ADMIN — MORPHINE SULFATE 1 MG: 4 INJECTION INTRAVENOUS at 08:56

## 2022-06-09 RX ADMIN — SENNOSIDES AND DOCUSATE SODIUM 2 TABLET: 50; 8.6 TABLET ORAL at 06:00

## 2022-06-09 RX ADMIN — DOCUSATE SODIUM 100 MG: 100 CAPSULE, LIQUID FILLED ORAL at 12:07

## 2022-06-09 RX ADMIN — OXYCODONE 5 MG: 5 TABLET ORAL at 16:56

## 2022-06-09 RX ADMIN — THERA TABS 1 TABLET: TAB at 06:00

## 2022-06-09 RX ADMIN — OXYCODONE 5 MG: 5 TABLET ORAL at 21:40

## 2022-06-09 RX ADMIN — Medication 400 MG: at 05:38

## 2022-06-09 RX ADMIN — NICOTINE TRANSDERMAL SYSTEM 21 MG: 21 PATCH, EXTENDED RELEASE TRANSDERMAL at 05:38

## 2022-06-09 RX ADMIN — POTASSIUM CHLORIDE 40 MEQ: 1500 TABLET, EXTENDED RELEASE ORAL at 07:55

## 2022-06-09 RX ADMIN — FOLIC ACID 1 MG: 1 TABLET ORAL at 06:00

## 2022-06-09 RX ADMIN — Medication 100 MG: at 06:00

## 2022-06-09 ASSESSMENT — LIFESTYLE VARIABLES
TREMOR: *
DOES PATIENT WANT TO STOP DRINKING: YES
ANXIETY: MILDLY ANXIOUS
ON A TYPICAL DAY WHEN YOU DRINK ALCOHOL HOW MANY DRINKS DO YOU HAVE: 3
HOW MANY TIMES IN THE PAST YEAR HAVE YOU HAD 5 OR MORE DRINKS IN A DAY: 300
CONSUMPTION TOTAL: POSITIVE
PAROXYSMAL SWEATS: NO SWEAT VISIBLE
TREMOR: NO TREMOR
TOTAL SCORE: 4
EVER FELT BAD OR GUILTY ABOUT YOUR DRINKING: YES
AVERAGE NUMBER OF DAYS PER WEEK YOU HAVE A DRINK CONTAINING ALCOHOL: 7
NAUSEA AND VOMITING: NO NAUSEA AND NO VOMITING
DOES PATIENT WANT TO TALK TO SOMEONE ABOUT QUITTING: YES
TOTAL SCORE: 4
AUDITORY DISTURBANCES: NOT PRESENT
TOTAL SCORE: 1
ANXIETY: NO ANXIETY (AT EASE)
ORIENTATION AND CLOUDING OF SENSORIUM: ORIENTED AND CAN DO SERIAL ADDITIONS
AGITATION: NORMAL ACTIVITY
TREMOR: TREMOR NOT VISIBLE BUT CAN BE FELT, FINGERTIP TO FINGERTIP
TOTAL SCORE: 2
VISUAL DISTURBANCES: NOT PRESENT
AGITATION: NORMAL ACTIVITY
NAUSEA AND VOMITING: *
PAROXYSMAL SWEATS: NO SWEAT VISIBLE
ALCOHOL_USE: YES
HEADACHE, FULLNESS IN HEAD: NOT PRESENT
HEADACHE, FULLNESS IN HEAD: NOT PRESENT
VISUAL DISTURBANCES: NOT PRESENT
AUDITORY DISTURBANCES: NOT PRESENT
AGITATION: NORMAL ACTIVITY
EVER HAD A DRINK FIRST THING IN THE MORNING TO STEADY YOUR NERVES TO GET RID OF A HANGOVER: YES
HAVE YOU EVER FELT YOU SHOULD CUT DOWN ON YOUR DRINKING: YES
ORIENTATION AND CLOUDING OF SENSORIUM: ORIENTED AND CAN DO SERIAL ADDITIONS
SUBSTANCE_ABUSE: 1
ANXIETY: MILDLY ANXIOUS
VISUAL DISTURBANCES: NOT PRESENT
PAROXYSMAL SWEATS: NO SWEAT VISIBLE
HEADACHE, FULLNESS IN HEAD: NOT PRESENT
TOTAL SCORE: 4
TOTAL SCORE: 4
AUDITORY DISTURBANCES: NOT PRESENT
ORIENTATION AND CLOUDING OF SENSORIUM: ORIENTED AND CAN DO SERIAL ADDITIONS
HAVE PEOPLE ANNOYED YOU BY CRITICIZING YOUR DRINKING: YES
NAUSEA AND VOMITING: NO NAUSEA AND NO VOMITING

## 2022-06-09 ASSESSMENT — ENCOUNTER SYMPTOMS
SHORTNESS OF BREATH: 0
MYALGIAS: 0
FALLS: 1
CHILLS: 0
NAUSEA: 0
FEVER: 0
WEAKNESS: 1
BLOOD IN STOOL: 0
HEADACHES: 0
DEPRESSION: 0
DIARRHEA: 0
DIZZINESS: 0
ABDOMINAL PAIN: 1
VOMITING: 0

## 2022-06-09 ASSESSMENT — PATIENT HEALTH QUESTIONNAIRE - PHQ9
1. LITTLE INTEREST OR PLEASURE IN DOING THINGS: NOT AT ALL
SUM OF ALL RESPONSES TO PHQ9 QUESTIONS 1 AND 2: 0
2. FEELING DOWN, DEPRESSED, IRRITABLE, OR HOPELESS: NOT AT ALL

## 2022-06-09 ASSESSMENT — PAIN DESCRIPTION - PAIN TYPE: TYPE: ACUTE PAIN;CHRONIC PAIN

## 2022-06-09 NOTE — DISCHARGE PLANNING
Anticipated Discharge Disposition: SNF    Action: SW met with Pt and her DTR bedside to complete SNF choice.  Pt agreeable to local SNF's besides [facility].  Completed choice faxed to [facility] for follow up.     Barriers to Discharge: Accepting SNF and transfer arrangements    Plan: Care Coordination to continue to monitor and assist as needed.

## 2022-06-09 NOTE — THERAPY
Occupational Therapy   Initial Evaluation     Patient Name: Vianney Castro  Age:  62 y.o., Sex:  female  Medical Record #: [id number]  Today's Date: 6/8/2022     Precautions  Precautions: Fall Risk    Assessment  Patient is a 62 y.o. female who presented to the hospital with abdominal pain and n/v for one week. Pt with h/o alcohol abuse, pancreatitis and tobacco use. At baseline, pt reports her S.O. does most of the IADLs and has recently been having to help her with bathing and lower body dressing. During OT eval, pt presents with impaired balance, decreased activity tolerance, tremulousness and generalized weakness impacting her safety and independence with ADLs and ADL transfers.      Plan    Recommend Occupational Therapy 3 times per week until therapy goals are met for the following treatments:  Adaptive Equipment, Neuro Re-Education / Balance, Self Care/Activities of Daily Living, Therapeutic Activities, and Therapeutic Exercises.    DC Equipment Recommendations: Unable to determine at this time  Discharge Recommendations: Recommend post-acute placement for additional occupational therapy services prior to discharge home     Subjective    Agreeable to therapy session     Objective       06/08/22 1653   Prior Living Situation   Prior Services None   Housing / Facility 2 Story Apartment / Condo   Steps Into Home 25   Steps In Home 0   Bathroom Set up Bathtub / Shower Combination;Shower Chair   Equipment Owned Front-Wheel Walker;Single Point Cane;Tub / Shower Seat   Lives with - Patient's Self Care Capacity Significant Other   Comments Pt's S.O. works outside the home   Prior Level of ADL Function   Self Feeding Independent   Grooming / Hygiene Independent   Bathing Requires Assist   Dressing Requires Assist   Toileting Independent   Prior Level of IADL Function   Medication Management Independent   Laundry Requires Assist   Kitchen Mobility Requires Assist   Home Management Requires Assist   Shopping Requires Assist    Prior Level Of Mobility Independent With Device in Home   Comments Pt reports that she spends much of her day watching t.v. Pt noted to have matted hair and reports she recently had to cut part of her hair off at home because it was so matted.   Precautions   Precautions Fall Risk   Vitals   O2 Delivery Device None - Room Air   Cognition    Cognition / Consciousness WDL   Active ROM Upper Body   Active ROM Upper Body  WDL   Dominant Hand Right   Strength Upper Body   Upper Body Strength  WDL   Sensation Upper Body   Upper Extremity Sensation  WDL   Comments denies any numbness/tingling   Upper Body Muscle Tone   Upper Body Muscle Tone  WDL   Coordination Upper Body   Comments tremulous   Balance Assessment   Sitting Balance (Static) Fair   Sitting Balance (Dynamic) Fair -   Standing Balance (Static) Poor   Standing Balance (Dynamic) Poor -   Weight Shift Sitting Fair   Weight Shift Standing Poor   Comments standing with FWW   Bed Mobility    Supine to Sit Standby Assist   Scooting Standby Assist   ADL Assessment   Eating Supervision  (seated to eat soup, able to open cracker packages)   Grooming Minimal Assist;Standing  (wash face at the sink, assist for balance)   Lower Body Dressing Contact Guard Assist  (extra time for socks)   Toileting   (declined, has been up to [facility] frequently with nsg)   6 Clicks Daily Activity Score 19   Functional Mobility   Sit to Stand Minimal Assist   Mobility walked to/from sink with FWW with assist for balance   Visual Perception   Visual Perception  X   Visual Acuity Wears Corrective Lenses  (reading glasses)   Patient / Family Goals   Patient / Family Goal #1 to get stronger   Short Term Goals   Short Term Goal # 1 Pt will stand at the sink to groom with supervision   Short Term Goal # 2 Pt will complete LB dressing with supervision   Short Term Goal # 3 Pt will complete toilet transfers with supervision   Education Group   Education Provided Role of Occupational Therapist   Role  of Occupational Therapist Patient Response Patient;Acceptance;Explanation;Verbal Demonstration   Problem List   Problem List Decreased Active Daily Living Skills;Decreased Functional Mobility;Decreased Activity Tolerance;Impaired Postural Control / Balance

## 2022-06-09 NOTE — PROGRESS NOTES
[facility] Medicine Daily Progress Note    Date of Service  6/9/2022    Chief Complaint  Vianney Castro is a 62 y.o. female admitted 6/7/2022 with abdominal pain, nausea, and vomiting    Hospital Course  62 y.o. female with pmhx of alcohol abuse, pancreatitis, tobacco abuse who presented 6/7/2022 with abdominal pain with associated nausea and vomiting for the last 1 week which has progressively worsening.no relieving or exacerbating factors. She reports have bright red blood on tissue when defecating x1 had significant constipation. She reports drinking every day with her last drink being this morning of 2 shots.  In the ER patient found to have significant hypokalemia, elevated lipase, and alcohol level 195.  Abdominal x-ray showed moderate amount of colonic stool.  Patient admitted for alcohol withdrawal and acute on chronic pancreatitis started on IV fluids.    Interval Problem Update  6/8 Patient reports her abdominal pain is improved this morning no longer nauseous. CIWA 3 this morning.  She has had multiple bowel movements since admission and denies any further blood.  Patient mildly tachycardic this morning otherwise vital signs stable.  Patient also reports that she has been having multiple falls at home even with her walker.  Her  is unable to help care for her as he is sick due to cirrhosis. PT recommending post acute placement, OT pending.   6/9 no acute events overnight, vital stable. Potassium and phosphorus low, replacement ordered. CIWA remained at 1-3 all day yesterday and overnight, will discontinue CIWA protocol.  PT/OT recommending postacute placement. PM&R evaluated the patient, she is not a candidate for IPR. Patient reports she is feeling better, abdominal pain has improved.     I have personally seen and examined the patient at bedside. I discussed the plan of care with patient and bedside RN.    Consultants/Specialty  N/A    Code Status  Full Code    Disposition  Patient is medically  cleared for discharge.   Anticipate discharge to to skilled nursing facility vs rehab   I have placed the appropriate orders for post-discharge needs.    Review of Systems  Review of Systems   Constitutional: Positive for malaise/fatigue. Negative for chills and fever.   Respiratory: Negative for shortness of breath.    Cardiovascular: Negative for chest pain.   Gastrointestinal: Positive for abdominal pain. Negative for blood in stool, diarrhea, nausea and vomiting.   Genitourinary: Negative for dysuria.   Musculoskeletal: Positive for falls. Negative for myalgias.   Skin: Negative for rash.   Neurological: Positive for weakness. Negative for dizziness and headaches.   Psychiatric/Behavioral: Positive for substance abuse. Negative for depression.   All other systems reviewed and are negative.       Physical Exam  Pulse:  [78-90] 78  Resp:  [14-16] 16  BP: (118-125)/(74-76) 118/76  SpO2:  [92 %-96 %] 92 %    Physical Exam  Vitals and nursing note reviewed.   Constitutional:       General: She is not in acute distress.     Appearance: She is ill-appearing (chronically).   HENT:      Head: Normocephalic and atraumatic.   Eyes:      Conjunctiva/sclera: Conjunctivae normal.   Cardiovascular:      Rate and Rhythm: Normal rate and regular rhythm.      Pulses: Normal pulses.      Heart sounds: Normal heart sounds.   Pulmonary:      Effort: Pulmonary effort is normal. No respiratory distress.      Breath sounds: Normal breath sounds. No wheezing or rales.   Abdominal:      General: Abdomen is flat. There is no distension.      Palpations: Abdomen is soft.      Tenderness: There is abdominal tenderness (mild diffuse).   Musculoskeletal:         General: Normal range of motion.      Cervical back: Normal range of motion and neck supple.      Right lower leg: No edema.      Left lower leg: No edema.   Skin:     General: Skin is warm and dry.   Neurological:      General: No focal deficit present.      Mental Status: She is  alert and oriented to person, place, and time.      Cranial Nerves: No cranial nerve deficit.   Psychiatric:         Mood and Affect: Mood normal.         Behavior: Behavior normal.         Fluids  No intake or output data in the 24 hours ending 06/09/22 1212    Laboratory  Recent Labs     06/07/22  1150 06/08/22  0310 06/08/22  1254   WBC 5.1  --   --    RBC 4.04*  --   --    HEMOGLOBIN 14.6 12.0 13.4   HEMATOCRIT 40.8 33.7* 38.1   .0*  --   --    MCH 36.1*  --   --    MCHC 35.8*  --   --    RDW 49.0  --   --    PLATELETCT 171  --   --    MPV 9.2  --   --      Recent Labs     06/07/22  1150 06/07/22  2320 06/09/22  0428   SODIUM 139 135 134*   POTASSIUM 2.5* 3.6 2.8*   CHLORIDE 92* 96 94*   CO2 30 26 28   GLUCOSE 90 98 87   BUN 6* 4* 3*   CREATININE 0.26* 0.24* 0.17*   CALCIUM 9.3 9.0 9.2                   Imaging  [id number] VIEWS   Final Result      Moderate amount of colonic stool.           Assessment/Plan  * Hypokalemia- (present on admission)  Assessment & Plan  Likely from alcohol use  Replace as needed    Hypophosphatemia  Assessment & Plan  Replace as needed    Falls- (present on admission)  Assessment & Plan  Reports multiple falls at home with walker   PT/OT- post acute placement   SNF referral and PM&R consulted    Tobacco abuse  Assessment & Plan  Education provided including patches, gum. Agreed to nicotine patches. Time spent 9 minutes.    Bright red blood per rectum  Assessment & Plan  Likely secondary to constipation and hard stool   Only reported 1 episode   Bowel regimen   H&H stable     Denies any further blood with BMs, resolved    Pancreatitis  Assessment & Plan  Acute on chronic   Increase diet to full liquids, advance as tolerated   IVF   Pain control   Counseled on alcohol cessation     CN (constipation)  Assessment & Plan  Bowel regimen with stool softeners     Alcohol withdrawal (HCC)- (present on admission)  Assessment & Plan  ciwa score 1-3 for last 24 hrs, dc ciwa  protocol  Monitor for DT's  IVF  Thiamine/folic acid/multi vitamins         VTE prophylaxis: SCDs/TEDs    I have performed a physical exam and reviewed and updated ROS and Plan today (6/9/2022). In review of yesterday's note (6/8/2022), there are no changes except as documented above.

## 2022-06-09 NOTE — PROGRESS NOTES
Patient awake alert and oriented. Respirations are even and unlabored. No complaints of pain or distress at this time. Initial assessment complete. Patient is on CIWA. Has not had a significant score to intervene. Pt is a one person assist. Has a brief on due to incontinence. All vitals are within normal limits. Plan is for SNF. Bed wheels locked. Rails up x2. Patient resting in at this time. Call light within reach. Will continue to monitor

## 2022-06-09 NOTE — PROGRESS NOTES
4 Eyes Skin Assessment Completed by STANLEY Molina and STANLEY Wolfe.    Head WDL  Ears WDL  Nose WDL  Mouth WDL  Neck WDL  Breast/Chest WDL  Shoulder Blades WDL  Spine WDL  (R) Arm/Elbow/Hand WDL  (L) Arm/Elbow/Hand WDL  Abdomen WDL  Groin WDL  Scrotum/Coccyx/Buttocks Redness and Blanching  (R) Leg Scab and Bruising  (L) Leg Scab and Bruising  (R) Heel/Foot/Toe WDL  (L) Heel/Foot/Toe WDL          Devices In Places none      Interventions In Place Pillows    Possible Skin Injury No    Pictures Uploaded Into Epic N/A  Wound Consult Placed N/A  RN Wound Prevention Protocol Ordered No

## 2022-06-09 NOTE — CARE PLAN
The patient is Stable - Low risk of patient condition declining or worsening    Shift Goals  Clinical Goals: Bed placement; decrease pain; wean from O2  Patient Goals: Pain management  Family Goals: NA    Progress made toward(s) clinical / shift goals:  ***

## 2022-06-09 NOTE — CONSULTS
Physical Medicine and Rehabilitation Consultation              Date of initial consultation: 2022  Consulting provider: Bailey Dao DO  Reason for consultation: assess for acute inpatient rehab appropriateness  LOS: 0 Day(s)    Chief complaint: Abdominal pain, nausea, vomiting    HPI: The patient is a 62 y.o. right hand dominant female with a past medical history of alcohol abuse, pancreatitis, tobacco abuse;  who presented on 2022 12:15 PM with abdominal pain, nausea and vomiting.  Patient also reported BRBPR.  Patient endorsed active daily tobacco and alcohol use.  Admission labs were significant for hypokalemia, elevated lipase and .  Patient was admitted for alcohol withdrawal and acute on chronic pancreatitis.  Patient has limited support at home,  unable to care for her as he is also debilitated due to cirrhosis.     Therapy notes reflect a poor quality living environment with failure to thrive and self-neglect, citing matted hair, minimal activity, and a codependent alcoholic relationship between patient and spouse, both suffering from chronic effects of alcoholism.    The patient currently reports abdominal pain, nausea, constipation, and diarrhea. Patient reports her  has quit drinking but is dying and not able to help on discharge. She said he called last night and said he wants to go on hospice.     ROS  Pertinent positives are mentioned in the HPI, all others reviewed and are negative.    Social Hx:  1 SH (second floor apartment)  [address]  With: Spouse, with multiple medical problems including cirrhosis and falls with loss of consciousness earlier this week    Employment: not working.   Tobacco: 1ppd   Alcohol: 1 pint/day   Drugs: denies     THERAPY:  Restrictions: fall risk   PT: Functional mobility   : Walking 20 feet with front wheel walker at min assist    OT: ADLs  : Min assist grooming    SLP:   None    IMAGIN/7 abdominal x-ray  Moderate amount of  "colonic stool.    PROCEDURES:  None    PMH:  Past Medical History:   Diagnosis Date   • Encephalitis     When she was 14 yrs old   • Hyperlipidemia        PSH:  Past Surgical History:   Procedure Laterality Date   • OTHER ORTHOPEDIC SURGERY     • NC TREAT ECTOPIC PREG,NON REMVAL     • TONSILLECTOMY         FHX:  Non-pertinent to today's issues    Medications:  Current Facility-Administered Medications   Medication Dose   • potassium phosphate IVPB 30 mmol in 500 mL D5W (premix)  30 mmol   • potassium chloride SA (Kdur) tablet 40 mEq  40 mEq   • magnesium oxide tablet 400 mg  400 mg   • thiamine (Vitamin B-1) tablet 100 mg  100 mg    And   • multivitamin (THERAGRAN) tablet 1 Tablet  1 Tablet    And   • folic acid (FOLVITE) tablet 1 mg  1 mg   • nicotine (NICODERM) 21 MG/24HR 21 mg  21 mg    And   • nicotine polacrilex (NICORETTE) 2 MG piece 2 mg  2 mg   • lactated ringers infusion     • senna-docusate (PERICOLACE or SENOKOT S) 8.6-50 MG per tablet 2 Tablet  2 Tablet    And   • polyethylene glycol/lytes (MIRALAX) PACKET 1 Packet  1 Packet    And   • magnesium hydroxide (MILK OF MAGNESIA) suspension 30 mL  30 mL    And   • bisacodyl (DULCOLAX) suppository 10 mg  10 mg     Current Outpatient Medications   Medication   • folic acid (FOLVITE) 1 MG Tab   • magnesium oxide 400 (240 Mg) MG Tab   • thiamine (THIAMINE) 100 MG tablet   • oxybutynin (DITROPAN) 5 MG Tab       Allergies:  No Known Allergies      Physical Exam:  Vitals: /76   Pulse 78   Temp 36.5 °C (97.7 °F) (Temporal)   Resp 16   Ht 1.676 m (5' 6\")   Wt 48.5 kg (107 lb)   SpO2 92%   Gen: NAD  Head: NC/AT  Eyes/ Nose/ Mouth: moist mucous membranes  Cardio: RRR, good distal perfusion, warm extremities  Pulm: normal respiratory effort, no cyanosis   Abd: Soft NTND, negative borborygmi   Ext: No peripheral edema. No calf tenderness. No clubbing.    Mental status: answers questions appropriately follows commands  Speech: fluent, no aphasia or " dysarthria    Motor:      Upper Extremity  Myotome R L   Shoulder flexion C5 4/5 4/5   Elbow flexion C5 4/5 4/5   Wrist extension C6 4/5 4/5   Elbow extension C7 4/5 4/5   Finger flexion C8 4/5 4/5   Finger abduction T1 4/5 4/5     Lower Extremity Myotome R L   Hip flexion L2 4/5 4/5   Knee extension L3 4/5 4/5   Ankle dorsiflexion L4 4/5 4/5   Toe extension L5 4/5 4/5   Ankle plantarflexion S1 4/5 4/5       Labs: Reviewed and significant for   Recent Labs     06/07/22  1150 06/08/22  0310 06/08/22  1254   RBC 4.04*  --   --    HEMOGLOBIN 14.6 12.0 13.4   HEMATOCRIT 40.8 33.7* 38.1   PLATELETCT 171  --   --      Recent Labs     06/07/22  1150 06/07/22  2320 06/09/22  0428   SODIUM 139 135 134*   POTASSIUM 2.5* 3.6 2.8*   CHLORIDE 92* 96 94*   CO2 30 26 28   GLUCOSE 90 98 87   BUN 6* 4* 3*   CREATININE 0.26* 0.24* 0.17*   CALCIUM 9.3 9.0 9.2     Recent Results (from the past 24 hour(s))   HEMOGLOBIN AND HEMATOCRIT    Collection Time: 06/08/22 12:54 PM   Result Value Ref Range    Hemoglobin 13.4 12.0 - 16.0 g/dL    Hematocrit 38.1 37.0 - 47.0 %   Renal Function Panel    Collection Time: 06/09/22  4:28 AM   Result Value Ref Range    Sodium 134 (L) 135 - 145 mmol/L    Potassium 2.8 (L) 3.6 - 5.5 mmol/L    Chloride 94 (L) 96 - 112 mmol/L    Co2 28 20 - 33 mmol/L    Glucose 87 65 - 99 mg/dL    Creatinine 0.17 (L) 0.50 - 1.40 mg/dL    Bun 3 (L) 8 - 22 mg/dL    Calcium 9.2 8.5 - 10.5 mg/dL    Phosphorus 1.9 (L) 2.5 - 4.5 mg/dL    Albumin 3.2 3.2 - 4.9 g/dL   MAGNESIUM    Collection Time: 06/09/22  4:28 AM   Result Value Ref Range    Magnesium 1.5 1.5 - 2.5 mg/dL   ESTIMATED GFR    Collection Time: 06/09/22  4:28 AM   Result Value Ref Range    GFR (CKD-EPI) 138 >60 mL/min/1.73 m 2         ASSESSMENT:  Patient is a 62 y.o. female admitted with alcoholic pancreatitis and debility/ failure to thrive     Rehabilitation: Impaired ADLs and mobility  Patient is not a candidate for IPR due to lack of discharge support and a living  environment that is not conducive to continued recovery after IPR    All cases are subject to administrative review and recommendations may change    Additional Recommendations:  - Recommend SNF placement. Patient is debilitated and will benefit from a slower, protracted recovery in a facility where she can get both therapy and nursing supervision for several weeks, which will also help her stay away from alcohol. Patient needs to build muscle strength to be able to return home at an independent level and care for her dying . IPR is unable to reach these goals in the amount of time we will have to work with her.   - Continue PT/OT while in house   - Continue medical management per primary team.   - Replace K as needed, low today at 2.8  - HGB appears stable   - Adding colace 100mg BID  - Agree with thiamine, folate and multi-v as ordered   - Tobacco abuse cessation counseling given today for ~5 min as it pertains to vascular disease, heart attack, stroke, wound healing, and pulmonary disease.   -PMR will sign off, please reconsult or reach out via Voalte if further evaluation or medical management is requested       Thank you for allowing us to participate in the care of this patient.     Patient was seen for 82 minutes on unit/floor of which > 50% of time was spent on counseling and coordination of care regarding the above, including prognosis, risk reduction, benefits of treatment, and options for next stage of care.    Dax Mart, DO   Physical Medicine and Rehabilitation     Please note that this dictation was created using voice recognition software. I have made every reasonable attempt to correct obvious errors, but there may be errors of grammar and possibly content that I did not discover before finalizing the note.

## 2022-06-09 NOTE — DISCHARGE PLANNING
Received Choice form at 0601  Agency/Facility Name: [facility]  Referral sent per Choice form @ 1874

## 2022-06-09 NOTE — PROGRESS NOTES
0720 - Received report and assumed care of patient.    0759 - Assessment completed. Patient medicated for 7/10 pain to abdomen. Vital signs are stable on room air, plan of care discussed, and all questions answered at this time.

## 2022-06-10 LAB
ANION GAP SERPL CALC-SCNC: 11 MMOL/L (ref 7–16)
BUN SERPL-MCNC: 4 MG/DL (ref 8–22)
CALCIUM SERPL-MCNC: 9.2 MG/DL (ref 8.5–10.5)
CHLORIDE SERPL-SCNC: 97 MMOL/L (ref 96–112)
CO2 SERPL-SCNC: 22 MMOL/L (ref 20–33)
CREAT SERPL-MCNC: 0.19 MG/DL (ref 0.5–1.4)
GFR SERPLBLD CREATININE-BSD FMLA CKD-EPI: 134 ML/MIN/1.73 M 2
GLUCOSE SERPL-MCNC: 93 MG/DL (ref 65–99)
POTASSIUM SERPL-SCNC: 3.7 MMOL/L (ref 3.6–5.5)
SODIUM SERPL-SCNC: 130 MMOL/L (ref 135–145)

## 2022-06-10 PROCEDURE — A9270 NON-COVERED ITEM OR SERVICE: HCPCS | Performed by: INTERNAL MEDICINE

## 2022-06-10 PROCEDURE — 700102 HCHG RX REV CODE 250 W/ 637 OVERRIDE(OP): Performed by: STUDENT IN AN ORGANIZED HEALTH CARE EDUCATION/TRAINING PROGRAM

## 2022-06-10 PROCEDURE — G0378 HOSPITAL OBSERVATION PER HR: HCPCS

## 2022-06-10 PROCEDURE — 700102 HCHG RX REV CODE 250 W/ 637 OVERRIDE(OP): Performed by: INTERNAL MEDICINE

## 2022-06-10 PROCEDURE — 36415 COLL VENOUS BLD VENIPUNCTURE: CPT

## 2022-06-10 PROCEDURE — A9270 NON-COVERED ITEM OR SERVICE: HCPCS | Performed by: STUDENT IN AN ORGANIZED HEALTH CARE EDUCATION/TRAINING PROGRAM

## 2022-06-10 PROCEDURE — 99225 PR SUBSEQUENT OBSERVATION CARE,LEVEL II: CPT | Performed by: INTERNAL MEDICINE

## 2022-06-10 PROCEDURE — 80048 BASIC METABOLIC PNL TOTAL CA: CPT

## 2022-06-10 RX ORDER — CALCIUM CARBONATE 500 MG/1
1000 TABLET, CHEWABLE ORAL 4 TIMES DAILY PRN
Status: DISCONTINUED | OUTPATIENT
Start: 2022-06-10 | End: 2022-06-13 | Stop reason: HOSPADM

## 2022-06-10 RX ORDER — ALUMINA, MAGNESIA, AND SIMETHICONE 2400; 2400; 240 MG/30ML; MG/30ML; MG/30ML
10 SUSPENSION ORAL 4 TIMES DAILY PRN
Status: DISCONTINUED | OUTPATIENT
Start: 2022-06-10 | End: 2022-06-13 | Stop reason: HOSPADM

## 2022-06-10 RX ADMIN — OXYCODONE 5 MG: 5 TABLET ORAL at 03:34

## 2022-06-10 RX ADMIN — FOLIC ACID 1 MG: 1 TABLET ORAL at 06:03

## 2022-06-10 RX ADMIN — ALUMINUM HYDROXIDE, MAGNESIUM HYDROXIDE, AND DIMETHICONE 10 ML: 400; 400; 40 SUSPENSION ORAL at 23:30

## 2022-06-10 RX ADMIN — OXYCODONE 5 MG: 5 TABLET ORAL at 19:41

## 2022-06-10 RX ADMIN — OXYCODONE 5 MG: 5 TABLET ORAL at 13:37

## 2022-06-10 RX ADMIN — NICOTINE TRANSDERMAL SYSTEM 21 MG: 21 PATCH, EXTENDED RELEASE TRANSDERMAL at 06:03

## 2022-06-10 RX ADMIN — THERA TABS 1 TABLET: TAB at 06:03

## 2022-06-10 RX ADMIN — POTASSIUM CHLORIDE 40 MEQ: 1500 TABLET, EXTENDED RELEASE ORAL at 06:03

## 2022-06-10 RX ADMIN — Medication 100 MG: at 06:03

## 2022-06-10 RX ADMIN — CALCIUM CARBONATE 1000 MG: 500 TABLET, CHEWABLE ORAL at 20:57

## 2022-06-10 RX ADMIN — Medication 400 MG: at 06:03

## 2022-06-10 ASSESSMENT — PAIN DESCRIPTION - PAIN TYPE
TYPE: ACUTE PAIN

## 2022-06-10 ASSESSMENT — ENCOUNTER SYMPTOMS
WEAKNESS: 1
SHORTNESS OF BREATH: 0
BLOOD IN STOOL: 0
DIZZINESS: 0
FALLS: 1
HEADACHES: 0
NAUSEA: 0
CHILLS: 0
DIARRHEA: 0
FEVER: 0
VOMITING: 0
DEPRESSION: 0
ABDOMINAL PAIN: 1
MYALGIAS: 0

## 2022-06-10 ASSESSMENT — LIFESTYLE VARIABLES: SUBSTANCE_ABUSE: 1

## 2022-06-10 NOTE — CARE PLAN
The patient is Stable - Low risk of patient condition declining or worsening    Shift Goals  Clinical Goals: monitor labs, monitor vs  Patient Goals: pain management  Family Goals: N/A    Progress made toward(s) clinical / shift goals:    Problem: Knowledge Deficit - Standard  Goal: Patient and family/care givers will demonstrate understanding of plan of care, disease process/condition, diagnostic tests and medications  Outcome: Progressing     Problem: Pain - Standard  Goal: Alleviation of pain or a reduction in pain to the patient’s comfort goal  Outcome: Progressing     Problem: Skin Integrity  Goal: Skin integrity is maintained or improved  Outcome: Progressing     Problem: Fall Risk  Goal: Patient will remain free from falls  Outcome: Progressing     Problem: Optimal Care for Alcohol Withdrawal  Goal: Optimal Care for the alcohol withdrawal patient  Outcome: Progressing     Problem: Seizure Precautions  Goal: Implementation of seizure precautions  Outcome: Progressing     Problem: Lifestyle Changes  Goal: Patient's ability to identify lifestyle changes and available resources to help reduce recurrence of condition will improve  Outcome: Progressing     Problem: Psychosocial  Goal: Patient's level of anxiety will decrease  Outcome: Progressing  Goal: Spiritual and cultural needs incorporated into hospitalization  Outcome: Progressing     Problem: Risk for Aspiration  Goal: Patient's risk for aspiration will be absent or decrease  Outcome: Progressing       Patient is not progressing towards the following goals:

## 2022-06-10 NOTE — PROGRESS NOTES
End of shift summary; patient incontinent throughout the night. Refused purewick stating It does not work. Patient encouraged to get up to commode when she needed to void but patient refused.   Report given to dayshift nurse who verbalizes understanding.

## 2022-06-10 NOTE — CARE PLAN
The patient is Watcher - Medium risk of patient condition declining or worsening    Shift Goals  Clinical Goals: monitor labs, monitor vs  Patient Goals: pain management  Family Goals: n/a    Progress made toward(s) clinical / shift goals:    Problem: Pain - Standard  Goal: Alleviation of pain or a reduction in pain to the patient’s comfort goal  Outcome: Progressing     Problem: Knowledge Deficit - Standard  Goal: Patient and family/care givers will demonstrate understanding of plan of care, disease process/condition, diagnostic tests and medications  Outcome: Progressing     Problem: Fall Risk  Goal: Patient will remain free from falls  Outcome: Progressing     Problem: Psychosocial  Goal: Patient's level of anxiety will decrease  Outcome: Progressing       Patient is not progressing towards the following goals:

## 2022-06-10 NOTE — PROGRESS NOTES
[facility] Medicine Daily Progress Note    Date of Service  6/10/2022    Chief Complaint  Vianney Castro is a 62 y.o. female admitted 6/7/2022 with abdominal pain, nausea, and vomiting    Hospital Course  62 y.o. female with pmhx of alcohol abuse, pancreatitis, tobacco abuse who presented 6/7/2022 with abdominal pain with associated nausea and vomiting for the last 1 week which has progressively worsening.no relieving or exacerbating factors. She reports have bright red blood on tissue when defecating x1 had significant constipation. She reports drinking every day with her last drink being this morning of 2 shots.  In the ER patient found to have significant hypokalemia, elevated lipase, and alcohol level 195.  Abdominal x-ray showed moderate amount of colonic stool.  Patient admitted for alcohol withdrawal and acute on chronic pancreatitis started on IV fluids.    Interval Problem Update  6/8 Patient reports her abdominal pain is improved this morning no longer nauseous. CIWA 3 this morning.  She has had multiple bowel movements since admission and denies any further blood.  Patient mildly tachycardic this morning otherwise vital signs stable.  Patient also reports that she has been having multiple falls at home even with her walker.  Her  is unable to help care for her as he is sick due to cirrhosis. PT recommending post acute placement, OT pending.   6/9 no acute events overnight, vital stable. Potassium and phosphorus low, replacement ordered. CIWA remained at 1-3 all day yesterday and overnight, will discontinue CIWA protocol.  PT/OT recommending postacute placement. PM&R evaluated the patient, she is not a candidate for IPR. Patient reports she is feeling better, abdominal pain has improved.   6/10 Vital signs stable. Potassium wnl this morning. Pending placement. Patient reports overall she is feeling better, main complaint is weakness.    I have personally seen and examined the patient at bedside. I  discussed the plan of care with patient, bedside RN and charge RN.    Consultants/Specialty  N/A    Code Status  Full Code    Disposition  Patient is medically cleared for discharge.   Anticipate discharge to to skilled nursing facility   I have placed the appropriate orders for post-discharge needs.    Review of Systems  Review of Systems   Constitutional: Positive for malaise/fatigue. Negative for chills and fever.   Respiratory: Negative for shortness of breath.    Cardiovascular: Negative for chest pain.   Gastrointestinal: Positive for abdominal pain. Negative for blood in stool, diarrhea, nausea and vomiting.   Genitourinary: Negative for dysuria.   Musculoskeletal: Positive for falls. Negative for myalgias.   Skin: Negative for rash.   Neurological: Positive for weakness. Negative for dizziness and headaches.   Psychiatric/Behavioral: Positive for substance abuse. Negative for depression.   All other systems reviewed and are negative.       Physical Exam  Temp:  [36.3 °C (97.3 °F)-36.9 °C (98.5 °F)] 36.3 °C (97.3 °F)  Pulse:  [] 89  Resp:  [16-18] 17  BP: (106-127)/(68-93) 127/84  SpO2:  [92 %-95 %] 92 %    Physical Exam  Vitals and nursing note reviewed.   Constitutional:       General: She is not in acute distress.     Appearance: She is ill-appearing (chronically).   HENT:      Head: Normocephalic and atraumatic.   Eyes:      Conjunctiva/sclera: Conjunctivae normal.   Cardiovascular:      Rate and Rhythm: Normal rate and regular rhythm.      Pulses: Normal pulses.      Heart sounds: Normal heart sounds.   Pulmonary:      Effort: Pulmonary effort is normal. No respiratory distress.      Breath sounds: Normal breath sounds. No wheezing or rales.   Abdominal:      General: Abdomen is flat. There is no distension.      Palpations: Abdomen is soft.      Tenderness: There is abdominal tenderness (mild epigastric).   Musculoskeletal:         General: Normal range of motion.      Cervical back: Normal range  of motion and neck supple.      Right lower leg: No edema.      Left lower leg: No edema.   Skin:     General: Skin is warm and dry.   Neurological:      General: No focal deficit present.      Mental Status: She is alert and oriented to person, place, and time.      Cranial Nerves: No cranial nerve deficit.   Psychiatric:         Mood and Affect: Mood normal.         Behavior: Behavior normal.         Fluids  No intake or output data in the 24 hours ending 06/10/22 1050    Laboratory  Recent Labs     06/07/22  1150 06/08/22  0310 06/08/22  1254   WBC 5.1  --   --    RBC 4.04*  --   --    HEMOGLOBIN 14.6 12.0 13.4   HEMATOCRIT 40.8 33.7* 38.1   .0*  --   --    MCH 36.1*  --   --    MCHC 35.8*  --   --    RDW 49.0  --   --    PLATELETCT 171  --   --    MPV 9.2  --   --      Recent Labs     06/09/22  0428 06/09/22  1355 06/10/22  0100   SODIUM 134* 130* 130*   POTASSIUM 2.8* 3.5* 3.7   CHLORIDE 94* 91* 97   CO2 28 25 22   GLUCOSE 87 92 93   BUN 3* 3* 4*   CREATININE 0.17* 0.29* 0.19*   CALCIUM 9.2 9.6 9.2                   Imaging  [id number]   Final Result      Moderate amount of colonic stool.           Assessment/Plan  * Hypokalemia- (present on admission)  Assessment & Plan  Likely from alcohol use  Replace as needed    Hypophosphatemia  Assessment & Plan  Replace as needed    Falls- (present on admission)  Assessment & Plan  Reports multiple falls at home with walker   PT/OT- post acute placement   SNF referral and PM&R consulted    Tobacco abuse  Assessment & Plan  Education provided including patches, gum. Agreed to nicotine patches. Time spent 9 minutes.    Bright red blood per rectum  Assessment & Plan  Likely secondary to constipation and hard stool   Only reported 1 episode   Bowel regimen   H&H stable     Denies any further blood with BMs, resolved    Pancreatitis  Assessment & Plan  Acute on chronic   Increase diet to full liquids, advance as tolerated   IVF   Pain control   Counseled on  alcohol cessation     CN (constipation)  Assessment & Plan  Bowel regimen with stool softeners     Alcohol withdrawal (HCC)- (present on admission)  Assessment & Plan  ciwa score 1-3 for last 24 hrs, dc ciwa protocol  Monitor for DT's  IVF  Thiamine/folic acid/multi vitamins         VTE prophylaxis: SCDs/TEDs    I have performed a physical exam and reviewed and updated ROS and Plan today (6/10/2022). In review of yesterday's note (6/9/2022), there are no changes except as documented above.

## 2022-06-10 NOTE — DIETARY
"Nutrition services: Day 0 of admit.  Vianney Castro is a 62 y.o. female with admitting DX of hypokalemia.    Consult received for wt loss (34 lbs or more in 6 months), poor PO per admit screen; unexplained wt loss consult, BMI <19. Spoke with pt at bedside. Pt was sitting up in bed, appeared thin. Nutrition focused physical exam not appropriate as pt was in pain. Pt reports a fair appetite. She stated a UBW of ~165 , unsure of when she last was at that weight. She reported to eating fairly prior to admit. Poor PO likely 2' hx of ETOH abuse. Pt agreeable to yogurt, cottage cheese and fruit and Boost Plus per in vanilla or strawberry per pt. RD will add supplement order and adjust menu accordingly.    Assessment:  Height: 167.6 cm (5' 6\")  Weight: 48.5 kg (107 lb)  Body mass index is 17.27 kg/m²., BMI classification: underweight  Diet/Intake: Low Fiber (GI soft); no PO recorded in chart to assess    Evaluation:   1. Admitted with abdominal pain, nausea, vomiting and chronic ETOH abuse (current use noted), pancreatitis.  2. Wt per chart review: 107 lbs 5/5/22, 100 lbs 5/3/22, 160 lbs 5/20/2009.    Malnutrition Risk: Unable to determine at this time.    Recommendations/Plan:  1. Boost Plus BID to optimize nutrition.  2. Encourage intake of meals and supplements.  3. Document intake of all meals and supplements as % taken in ADLs to provide interdisciplinary communication across all shifts.   4. Monitor weight.  5. Nutrition rep will continue to see patient for ongoing meal and snack preferences.     RD will follow per dept guidelines.          "

## 2022-06-10 NOTE — DISCHARGE PLANNING
Agency/Facility Name: [facility]   Spoke To: Abby   Outcome: Per Admissions Coordinator patient  referral will  be left on pending due to CIWA protocols in place.     Agency/Facility Name: [facility]   Spoke To: Ramy   Outcome: Patient has been accepted but beds will not be available until next week     Agency/Facility Name: [facility]   Spoke To: Mary   Outcome: DPA left voicemail     Agency/Facility Name: [facility]   Spoke To: Abby   Outcome:DPA confirmed CIWA protocols have been discontinued.  Referral will be reviewed again  Per Admissions Coordinator.

## 2022-06-11 PROCEDURE — 99224 PR SUBSEQUENT OBSERVATION CARE,LEVEL I: CPT | Performed by: INTERNAL MEDICINE

## 2022-06-11 PROCEDURE — A9270 NON-COVERED ITEM OR SERVICE: HCPCS | Performed by: INTERNAL MEDICINE

## 2022-06-11 PROCEDURE — 700102 HCHG RX REV CODE 250 W/ 637 OVERRIDE(OP): Performed by: INTERNAL MEDICINE

## 2022-06-11 PROCEDURE — A9270 NON-COVERED ITEM OR SERVICE: HCPCS | Performed by: STUDENT IN AN ORGANIZED HEALTH CARE EDUCATION/TRAINING PROGRAM

## 2022-06-11 PROCEDURE — 700102 HCHG RX REV CODE 250 W/ 637 OVERRIDE(OP): Performed by: STUDENT IN AN ORGANIZED HEALTH CARE EDUCATION/TRAINING PROGRAM

## 2022-06-11 PROCEDURE — G0378 HOSPITAL OBSERVATION PER HR: HCPCS

## 2022-06-11 PROCEDURE — A9270 NON-COVERED ITEM OR SERVICE: HCPCS | Performed by: PHYSICAL MEDICINE & REHABILITATION

## 2022-06-11 PROCEDURE — 700102 HCHG RX REV CODE 250 W/ 637 OVERRIDE(OP): Performed by: PHYSICAL MEDICINE & REHABILITATION

## 2022-06-11 RX ADMIN — FOLIC ACID 1 MG: 1 TABLET ORAL at 05:10

## 2022-06-11 RX ADMIN — OXYCODONE 5 MG: 5 TABLET ORAL at 05:17

## 2022-06-11 RX ADMIN — SENNOSIDES AND DOCUSATE SODIUM 2 TABLET: 50; 8.6 TABLET ORAL at 05:10

## 2022-06-11 RX ADMIN — OXYCODONE 5 MG: 5 TABLET ORAL at 22:10

## 2022-06-11 RX ADMIN — THERA TABS 1 TABLET: TAB at 05:11

## 2022-06-11 RX ADMIN — OXYCODONE 5 MG: 5 TABLET ORAL at 17:43

## 2022-06-11 RX ADMIN — OXYCODONE 5 MG: 5 TABLET ORAL at 09:33

## 2022-06-11 RX ADMIN — DOCUSATE SODIUM 100 MG: 100 CAPSULE, LIQUID FILLED ORAL at 05:11

## 2022-06-11 RX ADMIN — Medication 400 MG: at 05:10

## 2022-06-11 RX ADMIN — NICOTINE TRANSDERMAL SYSTEM 21 MG: 21 PATCH, EXTENDED RELEASE TRANSDERMAL at 05:10

## 2022-06-11 RX ADMIN — Medication 100 MG: at 05:11

## 2022-06-11 RX ADMIN — ALUMINUM HYDROXIDE, MAGNESIUM HYDROXIDE, AND DIMETHICONE 10 ML: 400; 400; 40 SUSPENSION ORAL at 05:18

## 2022-06-11 ASSESSMENT — LIFESTYLE VARIABLES
ANXIETY: NO ANXIETY (AT EASE)
HEADACHE, FULLNESS IN HEAD: NOT PRESENT
NAUSEA AND VOMITING: NO NAUSEA AND NO VOMITING
TREMOR: NO TREMOR
ORIENTATION AND CLOUDING OF SENSORIUM: ORIENTED AND CAN DO SERIAL ADDITIONS
TOTAL SCORE: 0
SUBSTANCE_ABUSE: 1
AGITATION: NORMAL ACTIVITY
VISUAL DISTURBANCES: NOT PRESENT
AUDITORY DISTURBANCES: NOT PRESENT
PAROXYSMAL SWEATS: NO SWEAT VISIBLE

## 2022-06-11 ASSESSMENT — PAIN DESCRIPTION - PAIN TYPE
TYPE: ACUTE PAIN

## 2022-06-11 ASSESSMENT — PATIENT HEALTH QUESTIONNAIRE - PHQ9
SUM OF ALL RESPONSES TO PHQ9 QUESTIONS 1 AND 2: 0
2. FEELING DOWN, DEPRESSED, IRRITABLE, OR HOPELESS: NOT AT ALL
1. LITTLE INTEREST OR PLEASURE IN DOING THINGS: NOT AT ALL

## 2022-06-11 ASSESSMENT — ENCOUNTER SYMPTOMS
HEADACHES: 0
FALLS: 1
DIARRHEA: 0
SHORTNESS OF BREATH: 0
ABDOMINAL PAIN: 1
MYALGIAS: 0
BLOOD IN STOOL: 0
NAUSEA: 0
CHILLS: 0
VOMITING: 0
DEPRESSION: 0
WEAKNESS: 1
DIZZINESS: 0
FEVER: 0

## 2022-06-11 NOTE — CARE PLAN
The patient is Stable - Low risk of patient condition declining or worsening    Shift Goals  Clinical Goals: Monitor labs, pain control  Patient Goals: Pain control  Family Goals: BEREKET    Progress made toward(s) clinical / shift goals:  Patient progressing well this shift. Potassium WNL this shift. A&O x 4. Skin intact. Tolerating low fiber diet. Continuing with room air O2. Moving well with 1 assist. Discharge plan is pending SNF placement.       Problem: Knowledge Deficit - Standard  Goal: Patient and family/care givers will demonstrate understanding of plan of care, disease process/condition, diagnostic tests and medications  Outcome: Progressing     Problem: Pain - Standard  Goal: Alleviation of pain or a reduction in pain to the patient’s comfort goal  Outcome: Progressing     Problem: Skin Integrity  Goal: Skin integrity is maintained or improved  Outcome: Progressing     Problem: Fall Risk  Goal: Patient will remain free from falls  Outcome: Progressing     Problem: Optimal Care for Alcohol Withdrawal  Goal: Optimal Care for the alcohol withdrawal patient  Outcome: Progressing     Problem: Seizure Precautions  Goal: Implementation of seizure precautions  Outcome: Progressing     Problem: Lifestyle Changes  Goal: Patient's ability to identify lifestyle changes and available resources to help reduce recurrence of condition will improve  Outcome: Progressing     Problem: Psychosocial  Goal: Patient's level of anxiety will decrease  Outcome: Progressing  Goal: Spiritual and cultural needs incorporated into hospitalization  Outcome: Progressing     Problem: Risk for Aspiration  Goal: Patient's risk for aspiration will be absent or decrease  Outcome: Progressing

## 2022-06-11 NOTE — DISCHARGE PLANNING
Agency/Facility Name: [facility]  Outcome: DPA left a voicemail for admissions regarding referral. DPA requested a call back.     Agency[facility]Facility Name: [facility]   Outcome: DPA left a voicemail for Mary regarding referral. DPA requested a call back.

## 2022-06-11 NOTE — PROGRESS NOTES
Report received, chart review complete. Patient resting in bed, call light in reach, care needs met at this time. A&Ox 4. Pain managed with well with oxy. Up with 1 assist. Tolerating room air O2. Patient progressing well at this time, educated on call light use and to call for help for any needs.

## 2022-06-11 NOTE — PROGRESS NOTES
[facility] Medicine Daily Progress Note    Date of Service  6/11/2022    Chief Complaint  Vianney Castro is a 62 y.o. female admitted 6/7/2022 with abdominal pain, nausea, and vomiting    Hospital Course  62 y.o. female with pmhx of alcohol abuse, pancreatitis, tobacco abuse who presented 6/7/2022 with abdominal pain with associated nausea and vomiting for the last 1 week which has progressively worsening.no relieving or exacerbating factors. She reports have bright red blood on tissue when defecating x1 had significant constipation. She reports drinking every day with her last drink being this morning of 2 shots.  In the ER patient found to have significant hypokalemia, elevated lipase, and alcohol level 195.  Abdominal x-ray showed moderate amount of colonic stool.  Patient admitted for alcohol withdrawal and acute on chronic pancreatitis started on IV fluids.    Interval Problem Update  6/8 Patient reports her abdominal pain is improved this morning no longer nauseous. CIWA 3 this morning.  She has had multiple bowel movements since admission and denies any further blood.  Patient mildly tachycardic this morning otherwise vital signs stable.  Patient also reports that she has been having multiple falls at home even with her walker.  Her  is unable to help care for her as he is sick due to cirrhosis. PT recommending post acute placement, OT pending.   6/9 no acute events overnight, vital stable. Potassium and phosphorus low, replacement ordered. CIWA remained at 1-3 all day yesterday and overnight, will discontinue CIWA protocol.  PT/OT recommending postacute placement. PM&R evaluated the patient, she is not a candidate for IPR. Patient reports she is feeling better, abdominal pain has improved.   6/10 Vital signs stable. Potassium wnl this morning. Pending placement. Patient reports overall she is feeling better, main complaint is weakness.  6/11 No changes. Pending SNF.     I have personally seen and examined  the patient at bedside. I discussed the plan of care with patient, bedside RN and charge RN.    Consultants/Specialty  N/A    Code Status  Full Code    Disposition  Patient is medically cleared for discharge.   Anticipate discharge to to skilled nursing facility   I have placed the appropriate orders for post-discharge needs.    Review of Systems  Review of Systems   Constitutional: Positive for malaise/fatigue. Negative for chills and fever.   Respiratory: Negative for shortness of breath.    Cardiovascular: Negative for chest pain.   Gastrointestinal: Positive for abdominal pain. Negative for blood in stool, diarrhea, nausea and vomiting.   Genitourinary: Negative for dysuria.   Musculoskeletal: Positive for falls. Negative for myalgias.   Skin: Negative for rash.   Neurological: Positive for weakness. Negative for dizziness and headaches.   Psychiatric/Behavioral: Positive for substance abuse. Negative for depression.   All other systems reviewed and are negative.       Physical Exam  Temp:  [36 °C (96.8 °F)-36.8 °C (98.3 °F)] 36 °C (96.8 °F)  Pulse:  [76-96] 76  Resp:  [16-17] 17  BP: (101-123)/(64-92) 116/78  SpO2:  [94 %-99 %] 94 %    Physical Exam  Vitals and nursing note reviewed.   Constitutional:       General: She is not in acute distress.     Appearance: She is ill-appearing (chronically).      Comments: Resting comfortably in bed    HENT:      Head: Normocephalic and atraumatic.   Eyes:      Conjunctiva/sclera: Conjunctivae normal.   Cardiovascular:      Rate and Rhythm: Normal rate and regular rhythm.      Pulses: Normal pulses.      Heart sounds: Normal heart sounds.   Pulmonary:      Effort: Pulmonary effort is normal. No respiratory distress.      Breath sounds: Normal breath sounds. No wheezing or rales.   Abdominal:      General: Abdomen is flat. There is no distension.      Palpations: Abdomen is soft.   Musculoskeletal:         General: Normal range of motion.      Cervical back: Normal range of  motion and neck supple.      Right lower leg: No edema.      Left lower leg: No edema.   Skin:     General: Skin is warm and dry.   Neurological:      General: No focal deficit present.      Mental Status: She is alert and oriented to person, place, and time. Mental status is at baseline.      Motor: Weakness present.         Fluids    Intake/Output Summary (Last 24 hours) at 6/11/2022 1104  Last data filed at 6/11/2022 0400  Gross per 24 hour   Intake 300 ml   Output --   Net 300 ml       Laboratory  Recent Labs     06/08/22  1254   HEMOGLOBIN 13.4   HEMATOCRIT 38.1     Recent Labs     06/09/22  0428 06/09/22  1355 06/10/22  0100   SODIUM 134* 130* 130*   POTASSIUM 2.8* 3.5* 3.7   CHLORIDE 94* 91* 97   CO2 28 25 22   GLUCOSE 87 92 93   BUN 3* 3* 4*   CREATININE 0.17* 0.29* 0.19*   CALCIUM 9.2 9.6 9.2                   Imaging  [id number] VIEWS   Final Result      Moderate amount of colonic stool.           Assessment/Plan  * Hypokalemia- (present on admission)  Assessment & Plan  Likely from alcohol use  Replace as needed    Hypophosphatemia  Assessment & Plan  Replace as needed    Falls- (present on admission)  Assessment & Plan  Reports multiple falls at home with walker   PT/OT- post acute placement   SNF referral and PM&R consulted    Tobacco abuse  Assessment & Plan  Education provided including patches, gum. Agreed to nicotine patches. Time spent 9 minutes.    Bright red blood per rectum  Assessment & Plan  Likely secondary to constipation and hard stool   Only reported 1 episode   Bowel regimen   H&H stable     Denies any further blood with BMs, resolved    Pancreatitis  Assessment & Plan  Acute on chronic   Increase diet to full liquids, advance as tolerated   IVF   Pain control   Counseled on alcohol cessation     CN (constipation)  Assessment & Plan  Bowel regimen with stool softeners     Alcohol withdrawal (HCC)- (present on admission)  Assessment & Plan  Initially monitored on [facility], since dc    IVF  Thiamine/folic acid/multi vitamins         VTE prophylaxis: SCDs/TEDs    I have performed a physical exam and reviewed and updated ROS and Plan today (6/11/2022). In review of yesterday's note (6/10/2022), there are no changes except as documented above.

## 2022-06-12 LAB
ANION GAP SERPL CALC-SCNC: 9 MMOL/L (ref 7–16)
BUN SERPL-MCNC: 10 MG/DL (ref 8–22)
CALCIUM SERPL-MCNC: 9.1 MG/DL (ref 8.5–10.5)
CHLORIDE SERPL-SCNC: 95 MMOL/L (ref 96–112)
CO2 SERPL-SCNC: 22 MMOL/L (ref 20–33)
CREAT SERPL-MCNC: 0.26 MG/DL (ref 0.5–1.4)
GFR SERPLBLD CREATININE-BSD FMLA CKD-EPI: 125 ML/MIN/1.73 M 2
GLUCOSE SERPL-MCNC: 109 MG/DL (ref 65–99)
POTASSIUM SERPL-SCNC: 4.1 MMOL/L (ref 3.6–5.5)
SODIUM SERPL-SCNC: 126 MMOL/L (ref 135–145)
TSH SERPL DL<=0.005 MIU/L-ACNC: 2.11 UIU/ML (ref 0.38–5.33)

## 2022-06-12 PROCEDURE — 99225 PR SUBSEQUENT OBSERVATION CARE,LEVEL II: CPT | Performed by: INTERNAL MEDICINE

## 2022-06-12 PROCEDURE — 700102 HCHG RX REV CODE 250 W/ 637 OVERRIDE(OP): Performed by: INTERNAL MEDICINE

## 2022-06-12 PROCEDURE — 80048 BASIC METABOLIC PNL TOTAL CA: CPT

## 2022-06-12 PROCEDURE — G0378 HOSPITAL OBSERVATION PER HR: HCPCS

## 2022-06-12 PROCEDURE — A9270 NON-COVERED ITEM OR SERVICE: HCPCS | Performed by: STUDENT IN AN ORGANIZED HEALTH CARE EDUCATION/TRAINING PROGRAM

## 2022-06-12 PROCEDURE — 700101 HCHG RX REV CODE 250: Performed by: INTERNAL MEDICINE

## 2022-06-12 PROCEDURE — 700102 HCHG RX REV CODE 250 W/ 637 OVERRIDE(OP): Performed by: PHYSICAL MEDICINE & REHABILITATION

## 2022-06-12 PROCEDURE — A9270 NON-COVERED ITEM OR SERVICE: HCPCS | Performed by: INTERNAL MEDICINE

## 2022-06-12 PROCEDURE — 84443 ASSAY THYROID STIM HORMONE: CPT

## 2022-06-12 PROCEDURE — 36415 COLL VENOUS BLD VENIPUNCTURE: CPT

## 2022-06-12 PROCEDURE — 700102 HCHG RX REV CODE 250 W/ 637 OVERRIDE(OP): Performed by: STUDENT IN AN ORGANIZED HEALTH CARE EDUCATION/TRAINING PROGRAM

## 2022-06-12 PROCEDURE — A9270 NON-COVERED ITEM OR SERVICE: HCPCS | Performed by: PHYSICAL MEDICINE & REHABILITATION

## 2022-06-12 RX ORDER — LIDOCAINE 50 MG/G
1 PATCH TOPICAL EVERY 24 HOURS
Status: DISCONTINUED | OUTPATIENT
Start: 2022-06-12 | End: 2022-06-13 | Stop reason: HOSPADM

## 2022-06-12 RX ORDER — OXYBUTYNIN CHLORIDE 5 MG/1
5 TABLET ORAL 2 TIMES DAILY
Status: DISCONTINUED | OUTPATIENT
Start: 2022-06-12 | End: 2022-06-13

## 2022-06-12 RX ADMIN — NICOTINE TRANSDERMAL SYSTEM 21 MG: 21 PATCH, EXTENDED RELEASE TRANSDERMAL at 05:33

## 2022-06-12 RX ADMIN — OXYCODONE 5 MG: 5 TABLET ORAL at 05:34

## 2022-06-12 RX ADMIN — LIDOCAINE 1 PATCH: 50 PATCH TOPICAL at 13:08

## 2022-06-12 RX ADMIN — POTASSIUM CHLORIDE 40 MEQ: 1500 TABLET, EXTENDED RELEASE ORAL at 05:33

## 2022-06-12 RX ADMIN — OXYCODONE 5 MG: 5 TABLET ORAL at 18:02

## 2022-06-12 RX ADMIN — OXYBUTYNIN CHLORIDE 5 MG: 5 TABLET ORAL at 09:12

## 2022-06-12 RX ADMIN — SENNOSIDES AND DOCUSATE SODIUM 2 TABLET: 50; 8.6 TABLET ORAL at 18:02

## 2022-06-12 RX ADMIN — OXYBUTYNIN CHLORIDE 5 MG: 5 TABLET ORAL at 18:02

## 2022-06-12 RX ADMIN — Medication 400 MG: at 05:33

## 2022-06-12 RX ADMIN — OXYCODONE 5 MG: 5 TABLET ORAL at 22:02

## 2022-06-12 RX ADMIN — OXYCODONE 5 MG: 5 TABLET ORAL at 09:51

## 2022-06-12 RX ADMIN — DOCUSATE SODIUM 100 MG: 100 CAPSULE, LIQUID FILLED ORAL at 05:32

## 2022-06-12 ASSESSMENT — PAIN DESCRIPTION - PAIN TYPE
TYPE: ACUTE PAIN

## 2022-06-12 ASSESSMENT — ENCOUNTER SYMPTOMS
BACK PAIN: 1
DIARRHEA: 0
DIZZINESS: 0
NAUSEA: 0
HEADACHES: 0
SHORTNESS OF BREATH: 0
FALLS: 1
BLOOD IN STOOL: 0
CHILLS: 0
FEVER: 0
ABDOMINAL PAIN: 0
WEAKNESS: 1
DEPRESSION: 0
MYALGIAS: 0
VOMITING: 0

## 2022-06-12 ASSESSMENT — COGNITIVE AND FUNCTIONAL STATUS - GENERAL
MOVING TO AND FROM BED TO CHAIR: A LITTLE
STANDING UP FROM CHAIR USING ARMS: A LITTLE
SUGGESTED CMS G CODE MODIFIER MOBILITY: CK
DAILY ACTIVITIY SCORE: 19
HELP NEEDED FOR BATHING: A LITTLE
PERSONAL GROOMING: A LITTLE
TOILETING: A LITTLE
WALKING IN HOSPITAL ROOM: A LITTLE
DRESSING REGULAR LOWER BODY CLOTHING: A LITTLE
MOVING FROM LYING ON BACK TO SITTING ON SIDE OF FLAT BED: A LOT
DRESSING REGULAR UPPER BODY CLOTHING: A LITTLE
SUGGESTED CMS G CODE MODIFIER DAILY ACTIVITY: CK
CLIMB 3 TO 5 STEPS WITH RAILING: A LOT
MOBILITY SCORE: 17

## 2022-06-12 ASSESSMENT — LIFESTYLE VARIABLES: SUBSTANCE_ABUSE: 1

## 2022-06-12 NOTE — PROGRESS NOTES
[facility] Medicine Daily Progress Note    Date of Service  6/12/2022    Chief Complaint  Vianney Castro is a 62 y.o. female admitted 6/7/2022 with abdominal pain, nausea, and vomiting    Hospital Course  62 y.o. female with pmhx of alcohol abuse, pancreatitis, tobacco abuse who presented 6/7/2022 with abdominal pain with associated nausea and vomiting for the last 1 week which has progressively worsening.no relieving or exacerbating factors. She reports have bright red blood on tissue when defecating x1 had significant constipation. She reports drinking every day with her last drink being this morning of 2 shots.  In the ER patient found to have significant hypokalemia, elevated lipase, and alcohol level 195.  Abdominal x-ray showed moderate amount of colonic stool.  Patient admitted for alcohol withdrawal and acute on chronic pancreatitis started on IV fluids.    Interval Problem Update  6/8 Patient reports her abdominal pain is improved this morning no longer nauseous. CIWA 3 this morning.  She has had multiple bowel movements since admission and denies any further blood.  Patient mildly tachycardic this morning otherwise vital signs stable.  Patient also reports that she has been having multiple falls at home even with her walker.  Her  is unable to help care for her as he is sick due to cirrhosis. PT recommending post acute placement, OT pending.   6/9 no acute events overnight, vital stable. Potassium and phosphorus low, replacement ordered. CIWA remained at 1-3 all day yesterday and overnight, will discontinue CIWA protocol.  PT/OT recommending postacute placement. PM&R evaluated the patient, she is not a candidate for IPR. Patient reports she is feeling better, abdominal pain has improved.   6/10 Vital signs stable. Potassium wnl this morning. Pending placement. Patient reports overall she is feeling better, main complaint is weakness.  6/11 No changes. Pending SNF.   6/12 Na 126 this morning, check TSH,  urine osm, urine sodium. Discontinue LR. Vitals stable. Has some lower back pain from fall prior to admission, lidocaine patch ordered    I have personally seen and examined the patient at bedside. I discussed the plan of care with patient, bedside RN and charge RN.    Consultants/Specialty  N/A    Code Status  Full Code    Disposition  Patient is medically cleared for discharge.   Anticipate discharge to to skilled nursing facility   I have placed the appropriate orders for post-discharge needs.    Review of Systems  Review of Systems   Constitutional: Positive for malaise/fatigue. Negative for chills and fever.   Respiratory: Negative for shortness of breath.    Cardiovascular: Negative for chest pain.   Gastrointestinal: Negative for abdominal pain, blood in stool, diarrhea, nausea and vomiting.   Genitourinary: Negative for dysuria.   Musculoskeletal: Positive for back pain and falls. Negative for myalgias.   Skin: Negative for rash.   Neurological: Positive for weakness. Negative for dizziness and headaches.   Psychiatric/Behavioral: Positive for substance abuse. Negative for depression.   All other systems reviewed and are negative.       Physical Exam  Temp:  [36.3 °C (97.3 °F)-36.5 °C (97.7 °F)] 36.4 °C (97.6 °F)  Pulse:  [90-94] 91  Resp:  [17-18] 18  BP: ()/(69-92) 105/89  SpO2:  [94 %-96 %] 96 %    Physical Exam  Vitals and nursing note reviewed.   Constitutional:       General: She is not in acute distress.     Appearance: She is ill-appearing (chronically).      Comments: Resting comfortably in bed    HENT:      Head: Normocephalic and atraumatic.   Eyes:      Conjunctiva/sclera: Conjunctivae normal.   Cardiovascular:      Rate and Rhythm: Normal rate and regular rhythm.      Pulses: Normal pulses.      Heart sounds: Normal heart sounds.   Pulmonary:      Effort: Pulmonary effort is normal. No respiratory distress.      Breath sounds: Normal breath sounds. No wheezing or rales.   Abdominal:       General: Abdomen is flat. There is no distension.      Palpations: Abdomen is soft.   Musculoskeletal:         General: Tenderness (lumbar ) present. Normal range of motion.      Cervical back: Normal range of motion and neck supple.      Right lower leg: No edema.      Left lower leg: No edema.   Skin:     General: Skin is warm and dry.   Neurological:      General: No focal deficit present.      Mental Status: She is alert and oriented to person, place, and time. Mental status is at baseline.      Motor: Weakness present.         Fluids    Intake/Output Summary (Last 24 hours) at 6/12/2022 1235  Last data filed at 6/12/2022 0600  Gross per 24 hour   Intake 360 ml   Output 0 ml   Net 360 ml       Laboratory      Recent Labs     06/09/22  1355 06/10/22  0100 06/12/22  0111   SODIUM 130* 130* 126*   POTASSIUM 3.5* 3.7 4.1   CHLORIDE 91* 97 95*   CO2 25 22 22   GLUCOSE 92 93 109*   BUN 3* 4* 10   CREATININE 0.29* 0.19* 0.26*   CALCIUM 9.6 9.2 9.1                   Imaging  [id number] VIEWS   Final Result      Moderate amount of colonic stool.           Assessment/Plan  * Hypokalemia- (present on admission)  Assessment & Plan  Likely from alcohol use  Replace as needed    Hypophosphatemia  Assessment & Plan  Replace as needed    Falls- (present on admission)  Assessment & Plan  Reports multiple falls at home with walker   PT/OT- post acute placement   SNF referral and PM&R consulted    Tobacco abuse  Assessment & Plan  Education provided including patches, gum. Agreed to nicotine patches. Time spent 9 minutes.    Bright red blood per rectum  Assessment & Plan  Likely secondary to constipation and hard stool   Only reported 1 episode   Bowel regimen   H&H stable     Denies any further blood with BMs, resolved    Pancreatitis  Assessment & Plan  Acute on chronic   Increase diet to full liquids, advance as tolerated   IVF   Pain control   Counseled on alcohol cessation     CN (constipation)  Assessment & Plan  Bowel  regimen with stool softeners     Alcohol withdrawal (HCC)- (present on admission)  Assessment & Plan  Initially monitored on CIWA, since dc   IVF  Thiamine/folic acid/multi vitamins         VTE prophylaxis: SCDs/TEDs    I have performed a physical exam and reviewed and updated ROS and Plan today (6/12/2022). In review of yesterday's note (6/11/2022), there are no changes except as documented above.

## 2022-06-12 NOTE — CARE PLAN
The patient is Stable - Low risk of patient condition declining or worsening    Shift Goals  Clinical Goals: monitor labs  Patient Goals: pain control  Family Goals: BEREKET    Progress made toward(s) clinical / shift goals:  medicated for pain as needed.      Problem: Knowledge Deficit - Standard  Goal: Patient and family/care givers will demonstrate understanding of plan of care, disease process/condition, diagnostic tests and medications  Outcome: Progressing     Problem: Pain - Standard  Goal: Alleviation of pain or a reduction in pain to the patient’s comfort goal  Outcome: Progressing     Problem: Fall Risk  Goal: Patient will remain free from falls  Outcome: Progressing     Problem: Lifestyle Changes  Goal: Patient's ability to identify lifestyle changes and available resources to help reduce recurrence of condition will improve  Outcome: Progressing     Problem: Psychosocial  Goal: Patient's level of anxiety will decrease  Outcome: Progressing  Goal: Spiritual and cultural needs incorporated into hospitalization  Outcome: Progressing       Patient is not progressing towards the following goals:

## 2022-06-13 VITALS
HEART RATE: 61 BPM | OXYGEN SATURATION: 93 % | DIASTOLIC BLOOD PRESSURE: 77 MMHG | HEIGHT: 66 IN | WEIGHT: 107 LBS | BODY MASS INDEX: 17.19 KG/M2 | TEMPERATURE: 97.4 F | SYSTOLIC BLOOD PRESSURE: 112 MMHG | RESPIRATION RATE: 18 BRPM

## 2022-06-13 PROBLEM — K85.90 PANCREATITIS: Status: RESOLVED | Noted: 2022-06-07 | Resolved: 2022-06-13

## 2022-06-13 PROBLEM — E87.6 HYPOKALEMIA: Status: RESOLVED | Noted: 2022-05-03 | Resolved: 2022-06-13

## 2022-06-13 PROBLEM — K59.00 CN (CONSTIPATION): Status: RESOLVED | Noted: 2022-05-05 | Resolved: 2022-06-13

## 2022-06-13 PROBLEM — E83.39 HYPOPHOSPHATEMIA: Status: RESOLVED | Noted: 2022-06-09 | Resolved: 2022-06-13

## 2022-06-13 PROBLEM — K62.5 BRIGHT RED BLOOD PER RECTUM: Status: RESOLVED | Noted: 2022-06-07 | Resolved: 2022-06-13

## 2022-06-13 PROBLEM — F10.939 ALCOHOL WITHDRAWAL (HCC): Status: RESOLVED | Noted: 2022-05-03 | Resolved: 2022-06-13

## 2022-06-13 LAB
ANION GAP SERPL CALC-SCNC: 10 MMOL/L (ref 7–16)
BUN SERPL-MCNC: 9 MG/DL (ref 8–22)
CALCIUM SERPL-MCNC: 9.3 MG/DL (ref 8.5–10.5)
CHLORIDE SERPL-SCNC: 99 MMOL/L (ref 96–112)
CO2 SERPL-SCNC: 20 MMOL/L (ref 20–33)
CREAT SERPL-MCNC: 0.27 MG/DL (ref 0.5–1.4)
GFR SERPLBLD CREATININE-BSD FMLA CKD-EPI: 124 ML/MIN/1.73 M 2
GLUCOSE SERPL-MCNC: 113 MG/DL (ref 65–99)
POTASSIUM SERPL-SCNC: 4 MMOL/L (ref 3.6–5.5)
SARS-COV+SARS-COV-2 AG RESP QL IA.RAPID: NOTDETECTED
SODIUM SERPL-SCNC: 129 MMOL/L (ref 135–145)
SPECIMEN SOURCE: NORMAL

## 2022-06-13 PROCEDURE — A9270 NON-COVERED ITEM OR SERVICE: HCPCS | Performed by: STUDENT IN AN ORGANIZED HEALTH CARE EDUCATION/TRAINING PROGRAM

## 2022-06-13 PROCEDURE — 80048 BASIC METABOLIC PNL TOTAL CA: CPT

## 2022-06-13 PROCEDURE — 700102 HCHG RX REV CODE 250 W/ 637 OVERRIDE(OP): Performed by: STUDENT IN AN ORGANIZED HEALTH CARE EDUCATION/TRAINING PROGRAM

## 2022-06-13 PROCEDURE — 36415 COLL VENOUS BLD VENIPUNCTURE: CPT

## 2022-06-13 PROCEDURE — G0378 HOSPITAL OBSERVATION PER HR: HCPCS

## 2022-06-13 PROCEDURE — A9270 NON-COVERED ITEM OR SERVICE: HCPCS | Performed by: INTERNAL MEDICINE

## 2022-06-13 PROCEDURE — 99217 PR OBSERVATION CARE DISCHARGE: CPT | Performed by: INTERNAL MEDICINE

## 2022-06-13 PROCEDURE — 87426 SARSCOV CORONAVIRUS AG IA: CPT

## 2022-06-13 PROCEDURE — 700102 HCHG RX REV CODE 250 W/ 637 OVERRIDE(OP): Performed by: INTERNAL MEDICINE

## 2022-06-13 RX ORDER — OXYBUTYNIN CHLORIDE 5 MG/1
10 TABLET ORAL DAILY
Qty: 60 TABLET | Refills: 0 | Status: SHIPPED
Start: 2022-06-14

## 2022-06-13 RX ORDER — OXYBUTYNIN CHLORIDE 5 MG/1
10 TABLET ORAL DAILY
Status: DISCONTINUED | OUTPATIENT
Start: 2022-06-14 | End: 2022-06-13 | Stop reason: HOSPADM

## 2022-06-13 RX ORDER — ACETAMINOPHEN 500 MG
500 TABLET ORAL EVERY 6 HOURS PRN
Qty: 30 TABLET | Refills: 0 | Status: SHIPPED
Start: 2022-06-13

## 2022-06-13 RX ADMIN — NICOTINE TRANSDERMAL SYSTEM 21 MG: 21 PATCH, EXTENDED RELEASE TRANSDERMAL at 05:04

## 2022-06-13 RX ADMIN — Medication 400 MG: at 05:05

## 2022-06-13 RX ADMIN — OXYBUTYNIN CHLORIDE 5 MG: 5 TABLET ORAL at 05:05

## 2022-06-13 RX ADMIN — OXYCODONE 5 MG: 5 TABLET ORAL at 09:18

## 2022-06-13 NOTE — PROGRESS NOTES
Pt requested oxybutynin order to be changed to 10 mg once a day as she stated that's how she was taking the medication prior to admission and worked better for her. Dr. Dao notified and gave okay to change order to 10 mg QD.

## 2022-06-13 NOTE — DISCHARGE PLANNING
Agency/Facility Name: [facility]  Outcome: DPA left Vmail requesting update on bed availability. Expecting call back from SNF.     1205-  Agency/Facility Name: [facility]  Outcome: DPA received Vmail form SNF stating that Pt is accepted, if transport provided on our end. DPA will call back with update.     RN CM notified.    1210-  Agency/Facility Name: [facility]   Spoke To: Reagan  Outcome: DPA notified SNF RN CM will coordinate transport. DPA to call back SNF with transport time.

## 2022-06-13 NOTE — CARE PLAN
The patient is Stable - Low risk of patient condition declining or worsening    Shift Goals  Clinical Goals: pain control, monitor labs  Patient Goals: pain control  Family Goals: BEREKET    Progress made toward(s) clinical / shift goals:    Problem: Knowledge Deficit - Standard  Goal: Patient and family/care givers will demonstrate understanding of plan of care, disease process/condition, diagnostic tests and medications  Outcome: Progressing     Problem: Pain - Standard  Goal: Alleviation of pain or a reduction in pain to the patient’s comfort goal  Outcome: Progressing     Problem: Fall Risk  Goal: Patient will remain free from falls  Outcome: Progressing       Patient is not progressing towards the following goals:

## 2022-06-13 NOTE — DISCHARGE SUMMARY
Discharge Summary    CHIEF COMPLAINT ON ADMISSION  Chief Complaint   Patient presents with   • Abdominal Pain       Reason for Admission  abd pain; constipation     Admission Date  6/7/2022    CODE STATUS  Full Code    HPI & HOSPITAL COURSE  This is a 62 y.o. female here with abdominal pain, nausea, and vomiting    62 y.o. female with pmhx of alcohol abuse, pancreatitis, tobacco abuse who presented 6/7/2022 with abdominal pain with associated nausea and vomiting for the last 1 week which has progressively worsening.no relieving or exacerbating factors. She reports have bright red blood on tissue when defecating x1 had significant constipation. She reports drinking every day with her last drink being this morning of 2 shots.  In the ER patient found to have significant hypokalemia, elevated lipase, and alcohol level 195.  Abdominal x-ray showed moderate amount of colonic stool.  Patient admitted for alcohol withdrawal and acute on chronic pancreatitis started on IV fluids.  Patient improved and CIWA scores were low so protocol was discontinued.  Patient had low electrolytes which were replaced. Her abdominal pain has improved and she is tolerating a diet. Patient reported multiple falls at home even with her walker, PT/OT evaluated the patient and recommended postacute placement.  PM&R determined patient was not a candidate for IPR.  Patient also with mild hyponatremia, mild improvement after fluids discontinued, TSH within normal limits, patient asymptomatic.  Patient's vital signs are stable and she is ready for discharge to SNF, she is to follow-up with her primary care physician for further monitoring of her sodium levels.  She is to return to the ER if her condition worsens.    Therefore, she is discharged in good and stable condition to skilled nursing facility.    The patient met 2-midnight criteria for an inpatient stay at the time of discharge.    Discharge Date  6/13/2022    FOLLOW UP ITEMS POST  DISCHARGE  Follow up with primary care     DISCHARGE DIAGNOSES  Principal Problem (Resolved):    Hypokalemia POA: Yes  Active Problems:    Tobacco abuse POA: Yes    Falls POA: Yes  Resolved Problems:    Alcohol withdrawal (HCC) POA: Yes    CN (constipation) POA: Unknown    Pancreatitis POA: Yes    Bright red blood per rectum POA: Unknown    Hypophosphatemia POA: Unknown      FOLLOW UP  No future appointments.  No follow-up provider specified.    MEDICATIONS ON DISCHARGE     Medication List      START taking these medications      Instructions   acetaminophen 500 MG Tabs  Commonly known as: TYLENOL   Take 1 Tablet by mouth every 6 hours as needed for Moderate Pain, Mild Pain or Fever.  Dose: 500 mg     Calcium Carbonate Antacid 1000 MG Chew   Chew 1 Tablet 4 times a day as needed (heartburn).  Dose: 1,000 mg        CHANGE how you take these medications      Instructions   oxybutynin 5 MG Tabs  Start taking on: June 14, 2022  What changed:   · how much to take  · when to take this  Commonly known as: DITROPAN   Take 2 Tablets by mouth every day.  Dose: 10 mg        CONTINUE taking these medications      Instructions   folic acid 1 MG Tabs  Commonly known as: FOLVITE   Take 1 Tablet by mouth every day.  Dose: 1 mg     magnesium oxide 400 (240 Mg) MG Tabs   Take 1 Tablet by mouth every day.  Dose: 400 mg     thiamine 100 MG tablet  Commonly known as: THIAMINE   Take 1 Tablet by mouth every day.  Dose: 100 mg            Allergies  No Known Allergies    DIET  Orders Placed This Encounter   Procedures   • Diet Order Diet: Low Fiber(GI Soft)     Standing Status:   Standing     Number of Occurrences:   1     Order Specific Question:   Diet:     Answer:   Low Fiber(GI Soft) [2]       ACTIVITY  As tolerated.  Weight bearing as tolerated    CONSULTATIONS  N/A    PROCEDURES  N/A    LABORATORY  Lab Results   Component Value Date    SODIUM 129 (L) 06/13/2022    POTASSIUM 4.0 06/13/2022    CHLORIDE 99 06/13/2022    CO2 20  06/13/2022    GLUCOSE 113 (H) 06/13/2022    BUN 9 06/13/2022    CREATININE 0.27 (L) 06/13/2022    CREATININE 0.6 05/20/2009        Lab Results   Component Value Date    WBC 5.1 06/07/2022    HEMOGLOBIN 13.4 06/08/2022    HEMATOCRIT 38.1 06/08/2022    PLATELETCT 171 06/07/2022        Total time of the discharge process exceeds 35 minutes.

## 2022-06-13 NOTE — DISCHARGE INSTRUCTIONS
Discharge Instructions    Discharged to other by medical transportation with escort. Discharged via wheelchair, hospital escort: Yes.  Special equipment needed: Not Applicable    Be sure to schedule a follow-up appointment with your primary care doctor or any specialists as instructed.     Discharge Plan:        I understand that a diet low in cholesterol, fat, and sodium is recommended for good health. Unless I have been given specific instructions below for another diet, I accept this instruction as my diet prescription.   Other diet: Low fiber    Special Instructions: None    Is patient discharged on Warfarin / Coumadin?   No     Depression / Suicide Risk    As you are discharged from this [facility] facility, it is important to learn how to keep safe from harming yourself.    Recognize the warning signs:  Abrupt changes in personality, positive or negative- including increase in energy   Giving away possessions  Change in eating patterns- significant weight changes-  positive or negative  Change in sleeping patterns- unable to sleep or sleeping all the time   Unwillingness or inability to communicate  Depression  Unusual sadness, discouragement and loneliness  Talk of wanting to die  Neglect of personal appearance   Rebelliousness- reckless behavior  Withdrawal from people/activities they love  Confusion- inability to concentrate     If you or a loved one observes any of these behaviors or has concerns about self-harm, here's what you can do:  Talk about it- your feelings and reasons for harming yourself  Remove any means that you might use to hurt yourself (examples: pills, rope, extension cords, firearm)  Get professional help from the community (Mental Health, Substance Abuse, psychological counseling)  Do not be alone:Call your Safe Contact- someone whom you trust who will be there for you.  Call your local CRISIS HOTLINE 697-0387 or 104-585-5445  Call your local Children's Mobile Crisis Response Team  [facility] (885) 088-7357 or [email or web address].Slingjot  Call the toll free National Suicide Prevention Hotlines   National Suicide Prevention Lifeline 849-578-UYOK (0285)  [place] Line Network 800-SUICIDE (511-6507)

## 2022-06-13 NOTE — DISCHARGE PLANNING
Case Management Discharge Planning    Admission Date: 6/7/2022  GMLOS:    ALOS: 0    6-Clicks ADL Score: 19  6-Clicks Mobility Score: 17  PT and/or OT Eval ordered: Yes  Post-acute Referrals Ordered: Yes  Post-acute Choice Obtained: Yes  Has referral(s) been sent to post-acute provider:  Yes      Anticipated Discharge Dispo: Discharge Disposition: D/T to SNF with Medicare cert in anticipation of skilled care (03)    DME Needed: No    Action(s) Taken: Patient has a bed at [facility]. Transport requested for 1500 via [facility]. covid test ordered. LSW to complete transfer packet.    Escalations Completed: None    Medically Clear: Yes    Next Steps: complete transfer packet    Barriers to Discharge: Transportation    Is the patient up for discharge tomorrow: No    Addendum 123: LSW met with patient at bedside. Patient wants clothes to dc in. LSW to look for clothing for patient. Patient requests LSW let her daughter know. LSW called Cydney @ [phone] and # is disconnected. LSW left VM for patient's son Cali with [facility]'s contact information.    Addendum 1310: LSW completed transfer packet. Clothes has been provided to RN for patient.

## 2022-06-13 NOTE — DISCHARGE PLANNING
Agency/Facility Name: [facility]  Spoke To: Reagan  Outcome: DPA confirmed Pt drop off time of 5913-3600 with SNF.

## 2022-06-13 NOTE — DISCHARGE PLANNING
DC Transport Scheduled    Received request at: 1304    Transport Company Scheduled:  [employer]  Spoke with Vikas at [facility] to schedule transport.  [facility] Trip #: [id number]     Scheduled Date: 06/13/2022  Scheduled Time: 0050-2833    Destination: [facility]     Notified care team of scheduled transport via Voalte.     If there are any changes needed to the DC transportation scheduled, please contact [facility] at ext. [phone] between the hours of 3810-1110 Mon-Fri. If outside those hours, contact the ED Case Manager at ext. [phone].

## 2024-01-16 NOTE — DISCHARGE PLANNING
Note:  Received Voalte from Carole from [facility] that  time is 1400 via [facility]. Bedside RN and MD updated. MD signed COBRA. RN CM spoke to pt at bedside, provided updates. Received consent for transfer to [facility] and transportation. COBRA, transfer packet given to ED RN.    Please contact for TCM as below  Also, Fax received about follow up imaging    Needs CT chest to follow up on incidental nodule and other changes (ground glass opacities) in 4-6 weeks  Order placed    Needs kidney MRI to follow up renal lesions, order placed. Should also be in 4-6 weeks